# Patient Record
Sex: MALE | Race: WHITE | NOT HISPANIC OR LATINO
[De-identification: names, ages, dates, MRNs, and addresses within clinical notes are randomized per-mention and may not be internally consistent; named-entity substitution may affect disease eponyms.]

---

## 2017-02-21 ENCOUNTER — RECORD ABSTRACTING (OUTPATIENT)
Age: 67
End: 2017-02-21

## 2017-02-21 DIAGNOSIS — Z98.890 OTHER SPECIFIED POSTPROCEDURAL STATES: ICD-10-CM

## 2017-02-21 DIAGNOSIS — Z78.9 OTHER SPECIFIED HEALTH STATUS: ICD-10-CM

## 2017-03-07 ENCOUNTER — APPOINTMENT (OUTPATIENT)
Dept: ELECTROPHYSIOLOGY | Facility: CLINIC | Age: 67
End: 2017-03-07

## 2017-03-07 VITALS
HEART RATE: 77 BPM | DIASTOLIC BLOOD PRESSURE: 95 MMHG | WEIGHT: 190 LBS | BODY MASS INDEX: 26.6 KG/M2 | SYSTOLIC BLOOD PRESSURE: 185 MMHG | HEIGHT: 71 IN

## 2017-03-07 RX ORDER — VALSARTAN 160 MG/1
160 TABLET ORAL
Refills: 0 | Status: DISCONTINUED | COMMUNITY
End: 2017-03-07

## 2017-03-23 ENCOUNTER — APPOINTMENT (OUTPATIENT)
Dept: INTERNAL MEDICINE | Facility: CLINIC | Age: 67
End: 2017-03-23

## 2017-03-23 VITALS — DIASTOLIC BLOOD PRESSURE: 82 MMHG | SYSTOLIC BLOOD PRESSURE: 126 MMHG

## 2017-03-23 VITALS
RESPIRATION RATE: 18 BRPM | DIASTOLIC BLOOD PRESSURE: 60 MMHG | OXYGEN SATURATION: 98 % | SYSTOLIC BLOOD PRESSURE: 132 MMHG | BODY MASS INDEX: 26.88 KG/M2 | WEIGHT: 192 LBS | TEMPERATURE: 98.5 F | HEART RATE: 86 BPM | HEIGHT: 71 IN

## 2017-03-23 RX ORDER — DICLOFENAC SODIUM 75 MG/1
75 TABLET, DELAYED RELEASE ORAL
Qty: 180 | Refills: 0 | Status: COMPLETED | COMMUNITY
Start: 2016-02-23

## 2017-03-23 RX ORDER — BUTALBITAL, ACETAMINOPHEN AND CAFFEINE 300; 50; 40 MG/1; MG/1; MG/1
50-300-40 CAPSULE ORAL
Qty: 60 | Refills: 0 | Status: COMPLETED | COMMUNITY
Start: 2017-02-23 | End: 2017-03-23

## 2017-03-23 RX ORDER — WARFARIN 2.5 MG/1
2.5 TABLET ORAL
Qty: 45 | Refills: 0 | Status: DISCONTINUED | COMMUNITY
Start: 2017-01-12

## 2017-03-23 RX ORDER — COLCHICINE 0.6 MG/1
0.6 CAPSULE ORAL
Qty: 30 | Refills: 0 | Status: DISCONTINUED | COMMUNITY
Start: 2017-01-25

## 2017-03-23 RX ORDER — ESCITALOPRAM OXALATE 10 MG/1
10 TABLET ORAL
Qty: 30 | Refills: 0 | Status: DISCONTINUED | COMMUNITY
Start: 2016-12-12

## 2017-03-23 RX ORDER — CLOPIDOGREL BISULFATE 75 MG/1
75 TABLET, FILM COATED ORAL
Qty: 90 | Refills: 0 | Status: DISCONTINUED | COMMUNITY
Start: 2016-09-29

## 2017-03-23 RX ORDER — AMOXICILLIN 500 MG/1
500 CAPSULE ORAL
Qty: 20 | Refills: 0 | Status: COMPLETED | COMMUNITY
Start: 2016-10-11

## 2017-04-24 ENCOUNTER — RX RENEWAL (OUTPATIENT)
Age: 67
End: 2017-04-24

## 2017-04-27 ENCOUNTER — APPOINTMENT (OUTPATIENT)
Dept: ELECTROPHYSIOLOGY | Facility: CLINIC | Age: 67
End: 2017-04-27

## 2017-05-16 ENCOUNTER — OTHER (OUTPATIENT)
Age: 67
End: 2017-05-16

## 2017-05-22 LAB
CHOLEST SERPL-MCNC: 153
GLUCOSE SERPL-MCNC: 92
HBA1C MFR BLD HPLC: 5.8
HDLC SERPL-MCNC: 51
LDLC SERPL CALC-MCNC: 83
PSA SERPL-MCNC: 1.8

## 2017-05-23 ENCOUNTER — APPOINTMENT (OUTPATIENT)
Dept: INTERNAL MEDICINE | Facility: CLINIC | Age: 67
End: 2017-05-23

## 2017-05-23 VITALS
BODY MASS INDEX: 26.88 KG/M2 | DIASTOLIC BLOOD PRESSURE: 88 MMHG | WEIGHT: 192 LBS | SYSTOLIC BLOOD PRESSURE: 162 MMHG | RESPIRATION RATE: 18 BRPM | HEART RATE: 82 BPM | OXYGEN SATURATION: 98 % | HEIGHT: 71 IN | TEMPERATURE: 98.2 F

## 2017-05-23 DIAGNOSIS — R51 HEADACHE: ICD-10-CM

## 2017-07-20 ENCOUNTER — MEDICATION RENEWAL (OUTPATIENT)
Age: 67
End: 2017-07-20

## 2017-10-03 ENCOUNTER — RX RENEWAL (OUTPATIENT)
Age: 67
End: 2017-10-03

## 2017-11-10 ENCOUNTER — RX RENEWAL (OUTPATIENT)
Age: 67
End: 2017-11-10

## 2017-11-21 ENCOUNTER — APPOINTMENT (OUTPATIENT)
Dept: INTERNAL MEDICINE | Facility: CLINIC | Age: 67
End: 2017-11-21
Payer: MEDICARE

## 2017-11-21 VITALS
DIASTOLIC BLOOD PRESSURE: 84 MMHG | HEART RATE: 75 BPM | BODY MASS INDEX: 28.42 KG/M2 | SYSTOLIC BLOOD PRESSURE: 140 MMHG | HEIGHT: 71 IN | TEMPERATURE: 97.9 F | WEIGHT: 203 LBS | RESPIRATION RATE: 16 BRPM | OXYGEN SATURATION: 97 %

## 2017-11-21 PROCEDURE — 94729 DIFFUSING CAPACITY: CPT

## 2017-11-21 PROCEDURE — 99214 OFFICE O/P EST MOD 30 MIN: CPT | Mod: 25

## 2017-11-21 PROCEDURE — 94010 BREATHING CAPACITY TEST: CPT

## 2017-11-21 RX ORDER — ALPRAZOLAM 0.25 MG/1
0.25 TABLET ORAL
Qty: 30 | Refills: 0 | Status: DISCONTINUED | COMMUNITY
Start: 2016-11-08 | End: 2017-11-21

## 2017-11-21 RX ORDER — ROSUVASTATIN CALCIUM 5 MG/1
TABLET, FILM COATED ORAL
Refills: 0 | Status: DISCONTINUED | COMMUNITY
End: 2017-11-21

## 2017-11-21 RX ORDER — METOPROLOL SUCCINATE 50 MG/1
50 TABLET, EXTENDED RELEASE ORAL
Refills: 0 | Status: DISCONTINUED | COMMUNITY
End: 2017-11-21

## 2017-11-21 RX ORDER — AMIODARONE HYDROCHLORIDE 100 MG/1
100 TABLET ORAL DAILY
Qty: 30 | Refills: 0 | Status: DISCONTINUED | COMMUNITY
Start: 2017-03-07 | End: 2017-11-21

## 2018-01-02 ENCOUNTER — RX RENEWAL (OUTPATIENT)
Age: 68
End: 2018-01-02

## 2018-01-22 ENCOUNTER — OTHER (OUTPATIENT)
Age: 68
End: 2018-01-22

## 2018-01-24 ENCOUNTER — NON-APPOINTMENT (OUTPATIENT)
Age: 68
End: 2018-01-24

## 2018-01-24 ENCOUNTER — APPOINTMENT (OUTPATIENT)
Dept: INTERNAL MEDICINE | Facility: CLINIC | Age: 68
End: 2018-01-24
Payer: MEDICARE

## 2018-01-24 VITALS
TEMPERATURE: 97.5 F | BODY MASS INDEX: 28.56 KG/M2 | OXYGEN SATURATION: 99 % | SYSTOLIC BLOOD PRESSURE: 108 MMHG | HEART RATE: 70 BPM | DIASTOLIC BLOOD PRESSURE: 84 MMHG | RESPIRATION RATE: 18 BRPM | WEIGHT: 203.99 LBS | HEIGHT: 71 IN

## 2018-01-24 DIAGNOSIS — Z95.2 PRESENCE OF PROSTHETIC HEART VALVE: ICD-10-CM

## 2018-01-24 DIAGNOSIS — Z79.899 OTHER LONG TERM (CURRENT) DRUG THERAPY: ICD-10-CM

## 2018-01-24 DIAGNOSIS — F52.21 MALE ERECTILE DISORDER: ICD-10-CM

## 2018-01-24 DIAGNOSIS — R33.9 RETENTION OF URINE, UNSPECIFIED: ICD-10-CM

## 2018-01-24 PROCEDURE — 94060 EVALUATION OF WHEEZING: CPT

## 2018-01-24 PROCEDURE — 99214 OFFICE O/P EST MOD 30 MIN: CPT | Mod: 25

## 2018-01-24 RX ORDER — PREDNISONE 10 MG/1
10 TABLET ORAL
Qty: 30 | Refills: 0 | Status: COMPLETED | COMMUNITY
Start: 2017-10-03

## 2018-01-24 RX ORDER — METOPROLOL TARTRATE 50 MG/1
50 TABLET, FILM COATED ORAL
Qty: 90 | Refills: 0 | Status: COMPLETED | COMMUNITY
Start: 2017-10-03

## 2018-01-24 RX ORDER — TRAMADOL HYDROCHLORIDE 50 MG/1
50 TABLET, COATED ORAL
Qty: 20 | Refills: 0 | Status: COMPLETED | COMMUNITY
Start: 2017-09-25

## 2018-01-24 RX ORDER — SILDENAFIL CITRATE 100 MG/1
100 TABLET, FILM COATED ORAL
Qty: 18 | Refills: 0 | Status: ACTIVE | COMMUNITY
Start: 2017-07-25

## 2018-01-31 NOTE — ASU PATIENT PROFILE, ADULT - PMH
BPH (benign prostatic hyperplasia)    ED (erectile dysfunction)    Frequency of urination    H/O hyperlipidemia    H/O urethral stricture    HTN (hypertension)    PAT (paroxysmal atrial tachycardia)    Ureteral stricture Asthma    BPH (benign prostatic hyperplasia)    CAD (coronary artery disease)    ED (erectile dysfunction)    Frequency of urination    H/O hyperlipidemia    H/O urethral stricture    HTN (hypertension)    PAT (paroxysmal atrial tachycardia)    Ureteral stricture

## 2018-01-31 NOTE — ASU PATIENT PROFILE, ADULT - PSH
Aortic valve replaced  [facility]  H/O coronary artery bypass surgery    H/O cystoscopy  x2  H/O inguinal hernia repair

## 2018-02-01 ENCOUNTER — OUTPATIENT (OUTPATIENT)
Dept: OUTPATIENT SERVICES | Facility: HOSPITAL | Age: 68
LOS: 1 days | Discharge: HOME | End: 2018-02-01

## 2018-02-01 VITALS
DIASTOLIC BLOOD PRESSURE: 90 MMHG | SYSTOLIC BLOOD PRESSURE: 157 MMHG | HEART RATE: 61 BPM | WEIGHT: 195.11 LBS | HEIGHT: 71 IN | TEMPERATURE: 98 F | RESPIRATION RATE: 16 BRPM | OXYGEN SATURATION: 100 %

## 2018-02-01 VITALS
RESPIRATION RATE: 16 BRPM | OXYGEN SATURATION: 99 % | SYSTOLIC BLOOD PRESSURE: 150 MMHG | TEMPERATURE: 97 F | HEART RATE: 60 BPM | DIASTOLIC BLOOD PRESSURE: 78 MMHG

## 2018-02-01 DIAGNOSIS — Z95.2 PRESENCE OF PROSTHETIC HEART VALVE: Chronic | ICD-10-CM

## 2018-02-01 DIAGNOSIS — Z95.1 PRESENCE OF AORTOCORONARY BYPASS GRAFT: Chronic | ICD-10-CM

## 2018-02-01 DIAGNOSIS — Z95.1 PRESENCE OF AORTOCORONARY BYPASS GRAFT: ICD-10-CM

## 2018-02-01 DIAGNOSIS — Z98.890 OTHER SPECIFIED POSTPROCEDURAL STATES: Chronic | ICD-10-CM

## 2018-02-01 DIAGNOSIS — I10 ESSENTIAL (PRIMARY) HYPERTENSION: ICD-10-CM

## 2018-02-01 DIAGNOSIS — N35.8 OTHER URETHRAL STRICTURE: ICD-10-CM

## 2018-02-01 DIAGNOSIS — I48.0 PAROXYSMAL ATRIAL FIBRILLATION: ICD-10-CM

## 2018-02-01 DIAGNOSIS — E78.5 HYPERLIPIDEMIA, UNSPECIFIED: ICD-10-CM

## 2018-02-01 DIAGNOSIS — Z95.2 PRESENCE OF PROSTHETIC HEART VALVE: ICD-10-CM

## 2018-02-01 LAB
INR BLD: 1 RATIO — SIGNIFICANT CHANGE UP (ref 0.88–1.16)
PROTHROM AB SERPL-ACNC: 10.8 SEC — SIGNIFICANT CHANGE UP (ref 9.8–12.7)

## 2018-02-01 RX ORDER — WARFARIN SODIUM 2.5 MG/1
0 TABLET ORAL
Qty: 0 | Refills: 0 | COMMUNITY

## 2018-02-01 RX ORDER — ACETAMINOPHEN 500 MG
100 TABLET ORAL
Qty: 0 | Refills: 0 | DISCHARGE
Start: 2018-02-01

## 2018-02-01 RX ORDER — ONDANSETRON 8 MG/1
4 TABLET, FILM COATED ORAL ONCE
Qty: 0 | Refills: 0 | Status: DISCONTINUED | OUTPATIENT
Start: 2018-02-01 | End: 2018-02-01

## 2018-02-01 RX ORDER — OXYCODONE HYDROCHLORIDE 5 MG/1
5 TABLET ORAL EVERY 4 HOURS
Qty: 0 | Refills: 0 | Status: DISCONTINUED | OUTPATIENT
Start: 2018-02-01 | End: 2018-02-01

## 2018-02-01 RX ORDER — ACETAMINOPHEN 500 MG
1000 TABLET ORAL ONCE
Qty: 0 | Refills: 0 | Status: DISCONTINUED | OUTPATIENT
Start: 2018-02-01 | End: 2018-02-01

## 2018-02-01 RX ORDER — FENTANYL CITRATE 50 UG/ML
50 INJECTION INTRAVENOUS
Qty: 0 | Refills: 0 | Status: DISCONTINUED | OUTPATIENT
Start: 2018-02-01 | End: 2018-02-01

## 2018-02-01 RX ORDER — SODIUM CHLORIDE 9 MG/ML
1000 INJECTION INTRAMUSCULAR; INTRAVENOUS; SUBCUTANEOUS
Qty: 0 | Refills: 0 | Status: DISCONTINUED | OUTPATIENT
Start: 2018-02-01 | End: 2018-02-01

## 2018-02-01 RX ADMIN — SODIUM CHLORIDE 75 MILLILITER(S): 9 INJECTION INTRAMUSCULAR; INTRAVENOUS; SUBCUTANEOUS at 08:23

## 2018-02-01 NOTE — ASU DISCHARGE PLAN (ADULT/PEDIATRIC). - MEDICATION SUMMARY - MEDICATIONS TO TAKE
I will START or STAY ON the medications listed below when I get home from the hospital:    Viagra 100 mg oral tablet  -- Indication: For PAT (paroxysmal atrial tachycardia)    acetaminophen 10 mg/mL intravenous solution  -- 100 milliliter(s) intravenous once  -- Indication: For URETHRAL STRICTURE    aspirin 81 mg oral tablet  -- Indication: For Ureteral stricture    valsartan 320 mg oral tablet  -- 1 tab(s) by mouth once a day  -- Indication: For URETHRAL STRICTURE    Lovenox 80 mg/0.8 mL injectable solution  -- injectable 2 times a day  -- Indication: For Ureteral stricture    rosuvastatin 5 mg oral tablet  -- 1  by mouth every other day  -- Indication: For PAT (paroxysmal atrial tachycardia)    ezetimibe 10 mg oral tablet  -- 1 tab(s) by mouth once a day  -- Indication: For HTN (hypertension)    Metoprolol Tartrate 100 mg oral tablet  -- 1 tab(s) by mouth every 12 hours  -- Indication: For Ureteral stricture

## 2018-02-01 NOTE — BRIEF OPERATIVE NOTE - PROCEDURE
<<-----Click on this checkbox to enter Procedure Cystourethroscopy with urethrotomy  02/01/2018    Active  [id number]

## 2018-04-06 PROBLEM — I10 ESSENTIAL (PRIMARY) HYPERTENSION: Chronic | Status: ACTIVE | Noted: 2018-01-31

## 2018-04-06 PROBLEM — R35.0 FREQUENCY OF MICTURITION: Chronic | Status: ACTIVE | Noted: 2018-01-31

## 2018-04-06 PROBLEM — I47.1 SUPRAVENTRICULAR TACHYCARDIA: Chronic | Status: ACTIVE | Noted: 2018-01-31

## 2018-04-06 PROBLEM — I25.10 ATHEROSCLEROTIC HEART DISEASE OF NATIVE CORONARY ARTERY WITHOUT ANGINA PECTORIS: Chronic | Status: ACTIVE | Noted: 2018-01-31

## 2018-04-06 PROBLEM — N40.0 BENIGN PROSTATIC HYPERPLASIA WITHOUT LOWER URINARY TRACT SYMPTOMS: Chronic | Status: ACTIVE | Noted: 2018-01-31

## 2018-04-06 PROBLEM — N13.5 CROSSING VESSEL AND STRICTURE OF URETER WITHOUT HYDRONEPHROSIS: Chronic | Status: ACTIVE | Noted: 2018-01-31

## 2018-04-06 PROBLEM — N52.9 MALE ERECTILE DYSFUNCTION, UNSPECIFIED: Chronic | Status: ACTIVE | Noted: 2018-01-31

## 2018-04-06 PROBLEM — Z86.39 PERSONAL HISTORY OF OTHER ENDOCRINE, NUTRITIONAL AND METABOLIC DISEASE: Chronic | Status: ACTIVE | Noted: 2018-01-31

## 2018-04-17 ENCOUNTER — APPOINTMENT (OUTPATIENT)
Dept: INTERNAL MEDICINE | Facility: CLINIC | Age: 68
End: 2018-04-17
Payer: MEDICARE

## 2018-04-17 VITALS
WEIGHT: 200 LBS | TEMPERATURE: 98.6 F | HEIGHT: 71 IN | BODY MASS INDEX: 28 KG/M2 | RESPIRATION RATE: 16 BRPM | OXYGEN SATURATION: 97 % | SYSTOLIC BLOOD PRESSURE: 120 MMHG | HEART RATE: 74 BPM | DIASTOLIC BLOOD PRESSURE: 80 MMHG

## 2018-04-17 DIAGNOSIS — F17.200 NICOTINE DEPENDENCE, UNSPECIFIED, UNCOMPLICATED: ICD-10-CM

## 2018-04-17 DIAGNOSIS — Z80.9 FAMILY HISTORY OF MALIGNANT NEOPLASM, UNSPECIFIED: ICD-10-CM

## 2018-04-17 DIAGNOSIS — Z80.42 FAMILY HISTORY OF MALIGNANT NEOPLASM OF PROSTATE: ICD-10-CM

## 2018-04-17 DIAGNOSIS — R73.02 IMPAIRED GLUCOSE TOLERANCE (ORAL): ICD-10-CM

## 2018-04-17 DIAGNOSIS — G47.00 INSOMNIA, UNSPECIFIED: ICD-10-CM

## 2018-04-17 PROCEDURE — 94727 GAS DIL/WSHOT DETER LNG VOL: CPT

## 2018-04-17 PROCEDURE — 99214 OFFICE O/P EST MOD 30 MIN: CPT | Mod: 25

## 2018-04-17 PROCEDURE — 94729 DIFFUSING CAPACITY: CPT

## 2018-04-17 PROCEDURE — 94060 EVALUATION OF WHEEZING: CPT

## 2018-07-02 ENCOUNTER — RX RENEWAL (OUTPATIENT)
Age: 68
End: 2018-07-02

## 2018-07-26 PROBLEM — R51 HEADACHE, UNSPECIFIED HEADACHE TYPE: Status: ACTIVE | Noted: 2017-03-07

## 2018-07-26 PROBLEM — Z78.9 ALCOHOL USE: Status: ACTIVE | Noted: 2017-02-21

## 2018-09-13 LAB
GLUCOSE SERPL-MCNC: 101
INR PPP: 2.9
PT BLD: 29.6

## 2018-09-26 ENCOUNTER — RX RENEWAL (OUTPATIENT)
Age: 68
End: 2018-09-26

## 2018-11-05 ENCOUNTER — RX RENEWAL (OUTPATIENT)
Age: 68
End: 2018-11-05

## 2018-11-12 ENCOUNTER — APPOINTMENT (OUTPATIENT)
Dept: INTERNAL MEDICINE | Facility: CLINIC | Age: 68
End: 2018-11-12
Payer: MEDICARE

## 2018-11-12 ENCOUNTER — NON-APPOINTMENT (OUTPATIENT)
Age: 68
End: 2018-11-12

## 2018-11-12 VITALS
WEIGHT: 195.99 LBS | HEART RATE: 68 BPM | BODY MASS INDEX: 27.44 KG/M2 | DIASTOLIC BLOOD PRESSURE: 84 MMHG | RESPIRATION RATE: 16 BRPM | HEIGHT: 71 IN | TEMPERATURE: 98.4 F | SYSTOLIC BLOOD PRESSURE: 128 MMHG | OXYGEN SATURATION: 97 %

## 2018-11-12 PROCEDURE — 99214 OFFICE O/P EST MOD 30 MIN: CPT | Mod: 25

## 2018-11-12 PROCEDURE — 94010 BREATHING CAPACITY TEST: CPT

## 2018-11-12 RX ORDER — ROSUVASTATIN CALCIUM 5 MG/1
5 TABLET, FILM COATED ORAL
Qty: 90 | Refills: 3 | Status: DISCONTINUED | COMMUNITY
Start: 2017-11-10 | End: 2018-11-12

## 2018-11-12 RX ORDER — TRAZODONE HYDROCHLORIDE 50 MG/1
50 TABLET ORAL
Qty: 180 | Refills: 1 | Status: DISCONTINUED | COMMUNITY
Start: 2018-04-17 | End: 2018-11-12

## 2018-11-12 RX ORDER — PREDNISONE 20 MG/1
20 TABLET ORAL TWICE DAILY
Qty: 28 | Refills: 0 | Status: DISCONTINUED | COMMUNITY
Start: 2018-04-17 | End: 2018-11-12

## 2018-11-12 NOTE — HISTORY OF PRESENT ILLNESS
[de-identified] : The patient comes in today for a followup evaluation and reassessment. There are several issues to discuss.\par \par From a cardiac standpoint, he continues to do well. He has been compliant with his medications. He denies any chest pains fevers chills and night sweats. There have been no palpitations. He states that he has been exercising by walking 4 miles approximately 5-6 times per week. He monitors his blood pressure at home, and the systolic has been between 130 and 140, and a diastolic in the low 80s.\par \par Recently, the patient's lipid lowering medications were switched from Crestor, over to Repatha. He has been on this new medication now for the past few months. He notes that he has not had any myalgias since starting the new injectable medication. He has been very compliant.\par \par The patient continues to see his urologist Dr. Dobson on a regular basis. He still has urinary urgency. He did have issues with the urethral stricture. He did undergo surgery back on 2/1/18 which helped slightly. He continues to catheterize himself approximately 5-6 times per week, just to prevent a stricture from returning.\par \par The patient denies any other constitutional symptoms at this time. He now comes in for this assessment.\par \par

## 2018-11-12 NOTE — PLAN
[FreeTextEntry1] : 1. Continue with medication as outlined above.\par \par 2. The patient will continue to remain extremely well-hydrated do to his history of vasovagal episodes.\par \par 3. The patient will continue with his lipid-lowering medications under the direction of Dr. Flores. I told him to ask him at the time of the next visit, if the Zetia can be discontinued.\par \par 4. The new shingles vaccine will be obtained at a local pharmacy.\par \par 5. Routine neurologic followup with Dr. Dobson\par \par 6. The patient is due for colonoscopy next year. He may consider the cholguard due to his history of difficulty with bridging with Lovenox and resumption of warfarin.\par \par 7. Your current exercise regimen.\par \par 8. Follow up with myself in 6 months with a wellness evaluation. He will undergo her yearly routine fasting blood work several days prior to that visit.

## 2018-11-12 NOTE — DATA REVIEWED
[FreeTextEntry1] : Blood work is reviewed.\par \par Spirometric analysis reveals a mild to moderate degree of obstruction. Bronchodilator reactivity was not assessed.

## 2018-11-12 NOTE — PHYSICAL EXAM
[General Appearance - Alert] : alert [General Appearance - In No Acute Distress] : in no acute distress [Outer Ear] : the ears and nose were normal in appearance [Oropharynx] : the oropharynx was normal [Neck Appearance] : the appearance of the neck was normal [Neck Cervical Mass (___cm)] : no neck mass was observed [Jugular Venous Distention Increased] : there was no jugular-venous distention [Thyroid Diffuse Enlargement] : the thyroid was not enlarged [Thyroid Nodule] : there were no palpable thyroid nodules [Apical Impulse] : the apical impulse was normal [Heart Sounds] : normal S1 and S2 [Arterial Pulses Carotid] : carotid pulses were normal with no bruits [Edema] : there was no peripheral edema [Bowel Sounds] : normal bowel sounds [Abdomen Soft] : soft [Abdomen Tenderness] : non-tender [] : no hepato-splenomegaly [Abdomen Mass (___ Cm)] : no abdominal mass palpated [Cervical Lymph Nodes Enlarged Posterior Bilaterally] : posterior cervical [Cervical Lymph Nodes Enlarged Anterior Bilaterally] : anterior cervical [Supraclavicular Lymph Nodes Enlarged Bilaterally] : supraclavicular [No CVA Tenderness] : no ~M costovertebral angle tenderness [FreeTextEntry1] : There is a well-healed scar in the lumbosacral region [Nail Clubbing] : no clubbing  or cyanosis of the fingernails

## 2018-12-31 ENCOUNTER — RX RENEWAL (OUTPATIENT)
Age: 68
End: 2018-12-31

## 2019-05-17 ENCOUNTER — APPOINTMENT (OUTPATIENT)
Dept: INTERNAL MEDICINE | Facility: CLINIC | Age: 69
End: 2019-05-17
Payer: MEDICARE

## 2019-05-17 VITALS
RESPIRATION RATE: 18 BRPM | BODY MASS INDEX: 27.16 KG/M2 | TEMPERATURE: 99.1 F | HEART RATE: 75 BPM | WEIGHT: 194 LBS | SYSTOLIC BLOOD PRESSURE: 134 MMHG | OXYGEN SATURATION: 98 % | DIASTOLIC BLOOD PRESSURE: 72 MMHG | HEIGHT: 71 IN

## 2019-05-17 PROCEDURE — G0438: CPT

## 2019-05-17 PROCEDURE — 99213 OFFICE O/P EST LOW 20 MIN: CPT | Mod: 25

## 2019-05-17 RX ORDER — EZETIMIBE 10 MG/1
10 TABLET ORAL
Qty: 90 | Refills: 1 | Status: DISCONTINUED | COMMUNITY
Start: 2018-01-02 | End: 2019-05-17

## 2019-05-17 NOTE — HISTORY OF PRESENT ILLNESS
[de-identified] : The patient comes in today for a wellness evaluation.\par \par Overall, the patient states that he is doing fairly well at this time. He did suffer a torn right hamstring last year. Apparently, he aggravated it after his last visit with me in November. He sought evaluation with Dr. Barron. An MRI was performed and he was noted to have a severe tear. He went to physical therapy with only minimal improvement. Gradually, over time, the injury has slowly improved. Unfortunately, he has also been complaining of bursitis in the right hip, as well as discomfort in the right ankle.\par \par The patient was seen by his gastroenterologist, Dr. hernandez. He was due for a colonoscopy this year. He did, however, substitute this with a colo-guard analysis which was negative. He was also seen recently by his cardiologist. He is stable in this regard. An echocardiogram and a cardiac PET CT scan were performed which were unremarkable. He was seen by his urologist. He continues to self catheterize himself approximately 4 times a week to maintain patency of the previously repaired stricture.\par \par The patient has been exercising and remains active. He denies any chest pains. There are no palpitations. He did undergo routine blood work. He now comes in for this assessment.

## 2019-05-17 NOTE — DATA REVIEWED
[FreeTextEntry1] : Yearly routine fasting blood work is reviewed. 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

## 2019-05-17 NOTE — REVIEW OF SYSTEMS
[Negative] : Psychiatric [FreeTextEntry8] : see history of present illness [FreeTextEntry9] : see history of present illness [FreeTextEntry5] : see history of present illness

## 2019-05-17 NOTE — PLAN
[FreeTextEntry1] : 1. Continue his medication as outlined above.\par \par 2. Continue his routine cardiology followup. He is being monitored on the Repatha for treatment of his hypercholesterolemia. His cardiologist, Dr. Flores, also monitors his INR.\par \par 3. Await carotid duplex Dopplers and an aortic sonogram which are scheduled for next month.\par \par 4. The patient will undergo a repeat CBC and serum LDH in mid June to reevaluate the leukopenia.\par \par 5. Followup in 6 months with spirometry, office visit, and flu shot.\par \par 6. The patient needs to obtain a second dose of the new shingles vaccine in the near future.

## 2019-05-17 NOTE — PHYSICAL EXAM
[General Appearance - Alert] : alert [General Appearance - In No Acute Distress] : in no acute distress [General Appearance - Well-Appearing] : healthy appearing [General Appearance - Well Developed] : well developed [Sclera] : the sclera and conjunctiva were normal [PERRL With Normal Accommodation] : pupils were equal in size, round, and reactive to light [Strabismus] : no strabismus was seen [Examination Of The Oral Cavity] : the lips and gums were normal [Hearing Threshold Finger Rub Not [place]] : hearing was normal [Outer Ear] : the ears and nose were normal in appearance [Neck Appearance] : the appearance of the neck was normal [Oropharynx] : the oropharynx was normal [Jugular Venous Distention Increased] : there was no jugular-venous distention [Neck Cervical Mass (___cm)] : no neck mass was observed [Thyroid Diffuse Enlargement] : the thyroid was not enlarged [Respiration, Rhythm And Depth] : normal respiratory rhythm and effort [Exaggerated Use Of Accessory Muscles For Inspiration] : no accessory muscle use [Auscultation Breath Sounds / Voice Sounds] : lungs were clear to auscultation bilaterally [Heart Sounds Gallop] : no gallops [Heart Rate And Rhythm] : heart rate was normal and rhythm regular [Heart Sounds Pericardial Friction Rub] : no pericardial rub [Systolic grade ___/6] : A grade [unfilled]/6 systolic murmur was heard. [FreeTextEntry1] : Mechanical S1 and S2 [Abdomen Soft] : soft [Veins - Varicosity Changes] : there were no varicosital changes [Abdomen Mass (___ Cm)] : no abdominal mass palpated [Edema] : there was no peripheral edema [Cervical Lymph Nodes Enlarged Anterior Bilaterally] : anterior cervical [Abnormal Walk] : normal gait [Supraclavicular Lymph Nodes Enlarged Bilaterally] : supraclavicular [Musculoskeletal - Swelling] : no joint swelling seen [Nail Clubbing] : no clubbing  or cyanosis of the fingernails [Skin Color & Pigmentation] : normal skin color and pigmentation [] : no rash [No Focal Deficits] : no focal deficits [Skin Turgor] : normal skin turgor [Oriented To Time, Place, And Person] : oriented to person, place, and time [Affect] : the affect was normal [Impaired Insight] : insight and judgment were intact

## 2019-06-17 LAB
BASOPHILS # BLD AUTO: 0.04 K/UL
BASOPHILS NFR BLD AUTO: 0.9 %
EOSINOPHIL # BLD AUTO: 0.09 K/UL
EOSINOPHIL NFR BLD AUTO: 2.1 %
HCT VFR BLD CALC: 42.3 %
HGB BLD-MCNC: 14.2 G/DL
IMM GRANULOCYTES NFR BLD AUTO: 0.5 %
LDH SERPL-CCNC: 252 U/L
LYMPHOCYTES # BLD AUTO: 1.25 K/UL
LYMPHOCYTES NFR BLD AUTO: 29.2 %
MAN DIFF?: NORMAL
MCHC RBC-ENTMCNC: 33.6 GM/DL
MCHC RBC-ENTMCNC: 33.6 PG
MCV RBC AUTO: 100.2 FL
MONOCYTES # BLD AUTO: 0.63 K/UL
MONOCYTES NFR BLD AUTO: 14.7 %
NEUTROPHILS # BLD AUTO: 2.25 K/UL
NEUTROPHILS NFR BLD AUTO: 52.6 %
PLATELET # BLD AUTO: 164 K/UL
RBC # BLD: 4.22 M/UL
RBC # FLD: 13.9 %
WBC # FLD AUTO: 4.28 K/UL

## 2019-06-19 LAB
MEV IGG FLD QL IA: 14.4 AU/ML
MEV IGG FLD QL IA: 15 AU/ML
MEV IGG+IGM SER-IMP: NEGATIVE
MEV IGG+IGM SER-IMP: NEGATIVE
MEV IGM SER QL: NEGATIVE
RUBV IGG FLD-ACNC: 27.2 INDEX
RUBV IGG FLD-ACNC: 29.9 INDEX
RUBV IGG SER-IMP: POSITIVE
RUBV IGG SER-IMP: POSITIVE
RUBV IGM FLD-ACNC: <20 AU/ML

## 2019-06-21 ENCOUNTER — APPOINTMENT (OUTPATIENT)
Dept: INTERNAL MEDICINE | Facility: CLINIC | Age: 69
End: 2019-06-21
Payer: MEDICARE

## 2019-06-21 ENCOUNTER — MED ADMIN CHARGE (OUTPATIENT)
Age: 69
End: 2019-06-21

## 2019-06-21 PROCEDURE — 90707 MMR VACCINE SC: CPT | Mod: GY

## 2019-06-21 PROCEDURE — 90471 IMMUNIZATION ADMIN: CPT | Mod: GY

## 2019-11-11 ENCOUNTER — MEDICATION RENEWAL (OUTPATIENT)
Age: 69
End: 2019-11-11

## 2019-12-20 ENCOUNTER — NON-APPOINTMENT (OUTPATIENT)
Age: 69
End: 2019-12-20

## 2019-12-20 ENCOUNTER — APPOINTMENT (OUTPATIENT)
Dept: INTERNAL MEDICINE | Facility: CLINIC | Age: 69
End: 2019-12-20
Payer: MEDICARE

## 2019-12-20 VITALS
HEIGHT: 71 IN | DIASTOLIC BLOOD PRESSURE: 80 MMHG | HEART RATE: 76 BPM | OXYGEN SATURATION: 96 % | BODY MASS INDEX: 27.3 KG/M2 | RESPIRATION RATE: 18 BRPM | TEMPERATURE: 97.9 F | SYSTOLIC BLOOD PRESSURE: 136 MMHG | WEIGHT: 195 LBS

## 2019-12-20 PROCEDURE — 94060 EVALUATION OF WHEEZING: CPT

## 2019-12-20 PROCEDURE — 99214 OFFICE O/P EST MOD 30 MIN: CPT | Mod: 25

## 2019-12-20 NOTE — PHYSICAL EXAM
[General Appearance - Alert] : alert [General Appearance - In No Acute Distress] : in no acute distress [Outer Ear] : the ears and nose were normal in appearance [Oropharynx] : the oropharynx was normal [Neck Appearance] : the appearance of the neck was normal [Neck Cervical Mass (___cm)] : no neck mass was observed [Jugular Venous Distention Increased] : there was no jugular-venous distention [Thyroid Diffuse Enlargement] : the thyroid was not enlarged [Heart Sounds] : normal S1 and S2 [Thyroid Nodule] : there were no palpable thyroid nodules [Apical Impulse] : the apical impulse was normal [Arterial Pulses Carotid] : carotid pulses were normal with no bruits [Edema] : there was no peripheral edema [Bowel Sounds] : normal bowel sounds [Abdomen Soft] : soft [Abdomen Tenderness] : non-tender [Abdomen Mass (___ Cm)] : no abdominal mass palpated [Cervical Lymph Nodes Enlarged Anterior Bilaterally] : anterior cervical [] : no hepato-splenomegaly [Cervical Lymph Nodes Enlarged Posterior Bilaterally] : posterior cervical [No CVA Tenderness] : no ~M costovertebral angle tenderness [Supraclavicular Lymph Nodes Enlarged Bilaterally] : supraclavicular [Nail Clubbing] : no clubbing  or cyanosis of the fingernails [FreeTextEntry1] : There is a well-healed scar in the lumbosacral region

## 2019-12-20 NOTE — HISTORY OF PRESENT ILLNESS
[de-identified] : This patient comes in today for a follow up evaluation. There are several issues to discuss.\par \par Overall, the patient states that he is doing well. He suffers from CAD, HTN, HLD, and afib. He remains compliant with his aspirin, metoprolol, norvasc, repatha, and warfarin for management. He exercises regularly at the gym a few days per week to use the rowing machine and lift weights. He denies CP, tightness, and palpitations. He is followed by Dr. Flores in cardiology. He had a PET/CT scan and echo performed earlier this year. Both were unremarkable.\par \par The patient notes the onset of URI symptoms about two months ago including coughing, wheezing, sore throat, dark green sputum production, and green nasal secretions. He states that his symptoms have come and gone over the past two months, however, they have worsened recently. He denies having a fever. He has not taken any OTC medication for management.\par \par He is followed by Dr. Castle in urology. He continues to self catheterize about 4 times per week to maintain patency or previously repaired stricture.\par \par He is up to date on his vaccines and denies chills, night sweats and any other constitutional symptoms. He now comes in for this assessment.

## 2019-12-20 NOTE — ADDENDUM
[FreeTextEntry1] : I, Gibran Moya, acted solely as a scribe for Dr. Nav Herrera on this date 12/20/2019.

## 2019-12-20 NOTE — PLAN
[FreeTextEntry1] : 1. Continue with medication outlined above.\par \par 2. Continue cardiovascular exercise regimen.\par \par 3. Follow up with Dr. Flores for routine cardiac evaluation.\par \par 4. Begin omnicef, 300 mg, b.i.d for 10 days to treat URI symptoms.\par \par 5. Begin medrol dosepak to treat URI symptoms.\par \par 6. Follow up with myself in 6 months for a wellness evaluation and RFBW.

## 2020-07-06 ENCOUNTER — APPOINTMENT (OUTPATIENT)
Dept: INTERNAL MEDICINE | Facility: CLINIC | Age: 70
End: 2020-07-06
Payer: MEDICARE

## 2020-07-06 VITALS
SYSTOLIC BLOOD PRESSURE: 136 MMHG | DIASTOLIC BLOOD PRESSURE: 80 MMHG | HEART RATE: 76 BPM | TEMPERATURE: 97.9 F | RESPIRATION RATE: 14 BRPM | OXYGEN SATURATION: 96 %

## 2020-07-06 PROCEDURE — G0439: CPT

## 2020-07-06 RX ORDER — CEFDINIR 300 MG/1
300 CAPSULE ORAL
Qty: 20 | Refills: 0 | Status: DISCONTINUED | COMMUNITY
Start: 2019-12-20 | End: 2020-07-06

## 2020-07-06 RX ORDER — METHYLPREDNISOLONE 4 MG/1
4 TABLET ORAL
Qty: 1 | Refills: 0 | Status: DISCONTINUED | COMMUNITY
Start: 2019-12-20 | End: 2020-07-06

## 2020-07-06 NOTE — HEALTH RISK ASSESSMENT
[Yes] : Yes [4 or more  times a week (4 pts)] : 4 or more  times a week (4 points) [1 or 2 (0 pts)] : 1 or 2 (0 points) [Never (0 pts)] : Never (0 points) [No] : In the past 12 months have you used drugs other than those required for medical reasons? No [0] : 2) Feeling down, depressed, or hopeless: Not at all (0) [Smoke Detector] : smoke detector [Carbon Monoxide Detector] : carbon monoxide detector [Guns at Home] : guns at home [Safety elements used in home] : safety elements used in home [Sunscreen] : uses sunscreen [Seat Belt] :  uses seat belt [] : No [de-identified] : former [ColonoscopyDate] : 01/19

## 2020-07-06 NOTE — PHYSICAL EXAM
[General Appearance - In No Acute Distress] : in no acute distress [General Appearance - Alert] : alert [General Appearance - Well-Appearing] : healthy appearing [General Appearance - Well Developed] : well developed [Sclera] : the sclera and conjunctiva were normal [Strabismus] : no strabismus was seen [PERRL With Normal Accommodation] : pupils were equal in size, round, and reactive to light [Outer Ear] : the ears and nose were normal in appearance [Hearing Threshold Finger Rub Not [place]] : hearing was normal [Examination Of The Oral Cavity] : the lips and gums were normal [Oropharynx] : the oropharynx was normal [Neck Appearance] : the appearance of the neck was normal [Neck Cervical Mass (___cm)] : no neck mass was observed [Jugular Venous Distention Increased] : there was no jugular-venous distention [Thyroid Diffuse Enlargement] : the thyroid was not enlarged [Exaggerated Use Of Accessory Muscles For Inspiration] : no accessory muscle use [Respiration, Rhythm And Depth] : normal respiratory rhythm and effort [Auscultation Breath Sounds / Voice Sounds] : lungs were clear to auscultation bilaterally [Heart Rate And Rhythm] : heart rate was normal and rhythm regular [Heart Sounds Gallop] : no gallops [Heart Sounds Pericardial Friction Rub] : no pericardial rub [Systolic grade ___/6] : A grade [unfilled]/6 systolic murmur was heard. [Edema] : there was no peripheral edema [Veins - Varicosity Changes] : there were no varicosital changes [Abdomen Soft] : soft [Abdomen Mass (___ Cm)] : no abdominal mass palpated [Cervical Lymph Nodes Enlarged Anterior Bilaterally] : anterior cervical [Supraclavicular Lymph Nodes Enlarged Bilaterally] : supraclavicular [Abnormal Walk] : normal gait [Nail Clubbing] : no clubbing  or cyanosis of the fingernails [Musculoskeletal - Swelling] : no joint swelling seen [Skin Color & Pigmentation] : normal skin color and pigmentation [Skin Turgor] : normal skin turgor [] : no rash [Oriented To Time, Place, And Person] : oriented to person, place, and time [No Focal Deficits] : no focal deficits [Affect] : the affect was normal [Impaired Insight] : insight and judgment were intact [FreeTextEntry1] : Mechanical S1 and S2 Yes

## 2020-07-06 NOTE — PLAN
[FreeTextEntry1] : 1. Continue with medication as outlined above.\par \par 2. Maintain current exercise regimen.\par \par 3. The patient will complete his cardiac noninvasive workup with carotid duplex studies, and peripheral arterial studies later this month with his cardiologist, Dr. Flores.\par \par 4. Followup in 6 months with Pre-post spirometry and blood pressure check.

## 2020-07-06 NOTE — HISTORY OF PRESENT ILLNESS
[de-identified] : Patient comes in today for a wellness evaluation.\par \par Overall, the patient states he is doing quite well at this time. She continues to exercise fairly regularly. He has lost an additional 10 pounds. He has increased his exercise by walking one hour 5 times a week. He has also cut back on his caloric intake. He denies any breathlessness when performing his walking exercises. There has been no chest pain.\par \par The patient was seen by his cardiologist. He recently underwent an echocardiogram which was stable and without new remarkable findings. He was seen recently by his urologist, Dr. Denver mullins. He continues to self catheterize approximately 3 times a week to prevent to stricture from recurring. His urinary flow is otherwise fair to adequate. He did undergo a colo-guard analysis last year which was negative. He now comes in for this assessment.

## 2020-12-07 ENCOUNTER — APPOINTMENT (OUTPATIENT)
Dept: INTERNAL MEDICINE | Facility: CLINIC | Age: 70
End: 2020-12-07
Payer: MEDICARE

## 2020-12-07 VITALS
TEMPERATURE: 97.9 F | SYSTOLIC BLOOD PRESSURE: 132 MMHG | HEART RATE: 57 BPM | DIASTOLIC BLOOD PRESSURE: 86 MMHG | RESPIRATION RATE: 18 BRPM | OXYGEN SATURATION: 98 % | HEIGHT: 71 IN | BODY MASS INDEX: 26.32 KG/M2 | WEIGHT: 188 LBS

## 2020-12-07 DIAGNOSIS — N35.919 UNSPECIFIED URETHRAL STRICTURE, MALE, UNSPECIFIED SITE: ICD-10-CM

## 2020-12-07 PROCEDURE — 99214 OFFICE O/P EST MOD 30 MIN: CPT

## 2020-12-07 RX ORDER — EVOLOCUMAB 140 MG/ML
140 INJECTION, SOLUTION SUBCUTANEOUS
Qty: 1 | Refills: 5 | Status: ACTIVE | COMMUNITY
Start: 2020-12-07

## 2020-12-07 NOTE — HISTORY OF PRESENT ILLNESS
[de-identified] : The patient comes in today for a followup evaluation. He has several issues to discuss.\par \par The patient was recently seen by his new cardiologist, Dr. Monteiro. He will be taking over for Dr. Flores. At that visit, his blood pressure was noted to be slightly elevated. He denies having any chest pain or chest tightness. He is compliant with his medical regimen as prescribed.\par \par The patient continues to self catheterize himself twice a week for urinary issues which include a urethral stricture. He notes that his urinary flow has been good recently.\par \par The patient is quite anxious over the current coronavirus pandemic. His appetite is slightly diminished secondary to anxiety due to the corona virus.\par \par The patient is complaining of pain in his left knee. He has been followed by his orthopedic surgeon, Dr. Barron. He states that he is not yet ready for a total knee replacement. He is waiting for the discomfort to quiet down before he engages in his exercise once again. There is no change in bowel habits. He is

## 2020-12-07 NOTE — PLAN
[FreeTextEntry1] : #1. Continue his medical regimen as outlined above.\par \par 2. Await cardiac PET CT scan which is scheduled for January of 2021\par \par 3. The INR will continue to be monitored by cardiology.\par \par 4. Routine urologic followup with Dr. Dobson\par \par 5. Follow up with myself in September with a wellness evaluation and yearly routine blood work.

## 2020-12-18 ENCOUNTER — APPOINTMENT (OUTPATIENT)
Dept: INTERNAL MEDICINE | Facility: CLINIC | Age: 70
End: 2020-12-18

## 2021-06-21 ENCOUNTER — NON-APPOINTMENT (OUTPATIENT)
Age: 71
End: 2021-06-21

## 2021-06-21 ENCOUNTER — INPATIENT (INPATIENT)
Facility: HOSPITAL | Age: 71
LOS: 3 days | Discharge: HOME | DRG: 864 | End: 2021-06-25
Attending: FAMILY MEDICINE | Admitting: FAMILY MEDICINE
Payer: MEDICARE

## 2021-06-21 ENCOUNTER — APPOINTMENT (OUTPATIENT)
Dept: INTERNAL MEDICINE | Facility: CLINIC | Age: 71
End: 2021-06-21
Payer: MEDICARE

## 2021-06-21 VITALS
HEART RATE: 60 BPM | WEIGHT: 190 LBS | RESPIRATION RATE: 16 BRPM | OXYGEN SATURATION: 97 % | BODY MASS INDEX: 26.6 KG/M2 | HEIGHT: 71 IN | DIASTOLIC BLOOD PRESSURE: 62 MMHG | SYSTOLIC BLOOD PRESSURE: 118 MMHG | TEMPERATURE: 99.6 F

## 2021-06-21 VITALS — HEIGHT: 71 IN | WEIGHT: 190.04 LBS

## 2021-06-21 DIAGNOSIS — R50.9 FEVER, UNSPECIFIED: ICD-10-CM

## 2021-06-21 DIAGNOSIS — Z98.890 OTHER SPECIFIED POSTPROCEDURAL STATES: Chronic | ICD-10-CM

## 2021-06-21 DIAGNOSIS — Z95.2 PRESENCE OF PROSTHETIC HEART VALVE: Chronic | ICD-10-CM

## 2021-06-21 DIAGNOSIS — Z95.1 PRESENCE OF AORTOCORONARY BYPASS GRAFT: Chronic | ICD-10-CM

## 2021-06-21 LAB
ALBUMIN SERPL ELPH-MCNC: 3.9 G/DL — SIGNIFICANT CHANGE UP (ref 3.3–5)
ALP SERPL-CCNC: 65 U/L — SIGNIFICANT CHANGE UP (ref 40–120)
ALT FLD-CCNC: 50 U/L — SIGNIFICANT CHANGE UP (ref 12–78)
ANION GAP SERPL CALC-SCNC: 8 MMOL/L — SIGNIFICANT CHANGE UP (ref 5–17)
APPEARANCE UR: ABNORMAL
APTT BLD: 35.4 SEC — SIGNIFICANT CHANGE UP (ref 27.5–35.5)
AST SERPL-CCNC: 54 U/L — HIGH (ref 15–37)
BASOPHILS # BLD AUTO: 0.02 K/UL — SIGNIFICANT CHANGE UP (ref 0–0.2)
BASOPHILS NFR BLD AUTO: 0.7 % — SIGNIFICANT CHANGE UP (ref 0–2)
BILIRUB SERPL-MCNC: 1.3 MG/DL — HIGH (ref 0.2–1.2)
BILIRUB UR-MCNC: NEGATIVE — SIGNIFICANT CHANGE UP
BUN SERPL-MCNC: 16 MG/DL — SIGNIFICANT CHANGE UP (ref 7–23)
CALCIUM SERPL-MCNC: 8.5 MG/DL — SIGNIFICANT CHANGE UP (ref 8.5–10.1)
CHLORIDE SERPL-SCNC: 96 MMOL/L — SIGNIFICANT CHANGE UP (ref 96–108)
CO2 SERPL-SCNC: 23 MMOL/L — SIGNIFICANT CHANGE UP (ref 22–31)
COLOR SPEC: YELLOW — SIGNIFICANT CHANGE UP
CREAT SERPL-MCNC: 1.27 MG/DL — SIGNIFICANT CHANGE UP (ref 0.5–1.3)
DIFF PNL FLD: ABNORMAL
EOSINOPHIL # BLD AUTO: 0 K/UL — SIGNIFICANT CHANGE UP (ref 0–0.5)
EOSINOPHIL NFR BLD AUTO: 0 % — SIGNIFICANT CHANGE UP (ref 0–6)
ERYTHROCYTE [SEDIMENTATION RATE] IN BLOOD: 12 MM/HR — SIGNIFICANT CHANGE UP (ref 0–20)
GLUCOSE SERPL-MCNC: 121 MG/DL — HIGH (ref 70–99)
GLUCOSE UR QL: NEGATIVE MG/DL — SIGNIFICANT CHANGE UP
HCT VFR BLD CALC: 38 % — LOW (ref 39–50)
HGB BLD-MCNC: 13.6 G/DL — SIGNIFICANT CHANGE UP (ref 13–17)
IMM GRANULOCYTES NFR BLD AUTO: 0.7 % — SIGNIFICANT CHANGE UP (ref 0–1.5)
INR BLD: 1.9 RATIO — HIGH (ref 0.88–1.16)
KETONES UR-MCNC: ABNORMAL
LACTATE SERPL-SCNC: 1.2 MMOL/L — SIGNIFICANT CHANGE UP (ref 0.7–2)
LEUKOCYTE ESTERASE UR-ACNC: NEGATIVE — SIGNIFICANT CHANGE UP
LYMPHOCYTES # BLD AUTO: 0.33 K/UL — LOW (ref 1–3.3)
LYMPHOCYTES # BLD AUTO: 12.2 % — LOW (ref 13–44)
MCHC RBC-ENTMCNC: 33.3 PG — SIGNIFICANT CHANGE UP (ref 27–34)
MCHC RBC-ENTMCNC: 35.8 GM/DL — SIGNIFICANT CHANGE UP (ref 32–36)
MCV RBC AUTO: 93.1 FL — SIGNIFICANT CHANGE UP (ref 80–100)
MONOCYTES # BLD AUTO: 0.23 K/UL — SIGNIFICANT CHANGE UP (ref 0–0.9)
MONOCYTES NFR BLD AUTO: 8.5 % — SIGNIFICANT CHANGE UP (ref 2–14)
NEUTROPHILS # BLD AUTO: 2.1 K/UL — SIGNIFICANT CHANGE UP (ref 1.8–7.4)
NEUTROPHILS NFR BLD AUTO: 77.9 % — HIGH (ref 43–77)
NITRITE UR-MCNC: NEGATIVE — SIGNIFICANT CHANGE UP
PH UR: 5 — SIGNIFICANT CHANGE UP (ref 5–8)
PLATELET # BLD AUTO: 79 K/UL — LOW (ref 150–400)
POTASSIUM SERPL-MCNC: 4.1 MMOL/L — SIGNIFICANT CHANGE UP (ref 3.5–5.3)
POTASSIUM SERPL-SCNC: 4.1 MMOL/L — SIGNIFICANT CHANGE UP (ref 3.5–5.3)
PROT SERPL-MCNC: 7.5 GM/DL — SIGNIFICANT CHANGE UP (ref 6–8.3)
PROT UR-MCNC: 30 MG/DL
PROTHROM AB SERPL-ACNC: 21.6 SEC — HIGH (ref 10.6–13.6)
RBC # BLD: 4.08 M/UL — LOW (ref 4.2–5.8)
RBC # FLD: 13.4 % — SIGNIFICANT CHANGE UP (ref 10.3–14.5)
SARS-COV-2 RNA SPEC QL NAA+PROBE: SIGNIFICANT CHANGE UP
SODIUM SERPL-SCNC: 127 MMOL/L — LOW (ref 135–145)
SP GR SPEC: 1.01 — SIGNIFICANT CHANGE UP (ref 1.01–1.02)
UROBILINOGEN FLD QL: NEGATIVE MG/DL — SIGNIFICANT CHANGE UP
WBC # BLD: 2.7 K/UL — LOW (ref 3.8–10.5)
WBC # FLD AUTO: 2.7 K/UL — LOW (ref 3.8–10.5)

## 2021-06-21 PROCEDURE — 86618 LYME DISEASE ANTIBODY: CPT

## 2021-06-21 PROCEDURE — 83735 ASSAY OF MAGNESIUM: CPT

## 2021-06-21 PROCEDURE — 85610 PROTHROMBIN TIME: CPT

## 2021-06-21 PROCEDURE — 99214 OFFICE O/P EST MOD 30 MIN: CPT

## 2021-06-21 PROCEDURE — 88365 INSITU HYBRIDIZATION (FISH): CPT

## 2021-06-21 PROCEDURE — 86665 EPSTEIN-BARR CAPSID VCA: CPT

## 2021-06-21 PROCEDURE — 0225U NFCT DS DNA&RNA 21 SARSCOV2: CPT

## 2021-06-21 PROCEDURE — 88313 SPECIAL STAINS GROUP 2: CPT

## 2021-06-21 PROCEDURE — 80061 LIPID PANEL: CPT

## 2021-06-21 PROCEDURE — 86663 EPSTEIN-BARR ANTIBODY: CPT

## 2021-06-21 PROCEDURE — 82746 ASSAY OF FOLIC ACID SERUM: CPT

## 2021-06-21 PROCEDURE — 88342 IMHCHEM/IMCYTCHM 1ST ANTB: CPT

## 2021-06-21 PROCEDURE — 88360 TUMOR IMMUNOHISTOCHEM/MANUAL: CPT

## 2021-06-21 PROCEDURE — 86769 SARS-COV-2 COVID-19 ANTIBODY: CPT

## 2021-06-21 PROCEDURE — 85097 BONE MARROW INTERPRETATION: CPT

## 2021-06-21 PROCEDURE — 80053 COMPREHEN METABOLIC PANEL: CPT

## 2021-06-21 PROCEDURE — 86747 PARVOVIRUS ANTIBODY: CPT

## 2021-06-21 PROCEDURE — 86803 HEPATITIS C AB TEST: CPT

## 2021-06-21 PROCEDURE — 87205 SMEAR GRAM STAIN: CPT

## 2021-06-21 PROCEDURE — 99223 1ST HOSP IP/OBS HIGH 75: CPT | Mod: GC

## 2021-06-21 PROCEDURE — 85027 COMPLETE CBC AUTOMATED: CPT

## 2021-06-21 PROCEDURE — 85025 COMPLETE CBC W/AUTO DIFF WBC: CPT

## 2021-06-21 PROCEDURE — 88341 IMHCHEM/IMCYTCHM EA ADD ANTB: CPT

## 2021-06-21 PROCEDURE — 85730 THROMBOPLASTIN TIME PARTIAL: CPT

## 2021-06-21 PROCEDURE — 86666 EHRLICHIA ANTIBODY: CPT

## 2021-06-21 PROCEDURE — 86664 EPSTEIN-BARR NUCLEAR ANTIGEN: CPT

## 2021-06-21 PROCEDURE — 88264 CHROMOSOME ANALYSIS 20-25: CPT

## 2021-06-21 PROCEDURE — 88185 FLOWCYTOMETRY/TC ADD-ON: CPT

## 2021-06-21 PROCEDURE — 93010 ELECTROCARDIOGRAM REPORT: CPT

## 2021-06-21 PROCEDURE — 86645 CMV ANTIBODY IGM: CPT

## 2021-06-21 PROCEDURE — 88364 INSITU HYBRIDIZATION (FISH): CPT

## 2021-06-21 PROCEDURE — 71250 CT THORAX DX C-: CPT

## 2021-06-21 PROCEDURE — 77012 CT SCAN FOR NEEDLE BIOPSY: CPT

## 2021-06-21 PROCEDURE — 88305 TISSUE EXAM BY PATHOLOGIST: CPT

## 2021-06-21 PROCEDURE — 99285 EMERGENCY DEPT VISIT HI MDM: CPT | Mod: CS

## 2021-06-21 PROCEDURE — 86753 PROTOZOA ANTIBODY NOS: CPT

## 2021-06-21 PROCEDURE — 88271 CYTOGENETICS DNA PROBE: CPT

## 2021-06-21 PROCEDURE — 36415 COLL VENOUS BLD VENIPUNCTURE: CPT

## 2021-06-21 PROCEDURE — 86644 CMV ANTIBODY: CPT

## 2021-06-21 PROCEDURE — 88312 SPECIAL STAINS GROUP 1: CPT

## 2021-06-21 PROCEDURE — 88184 FLOWCYTOMETRY/ TC 1 MARKER: CPT

## 2021-06-21 PROCEDURE — 88280 CHROMOSOME KARYOTYPE STUDY: CPT

## 2021-06-21 PROCEDURE — 71045 X-RAY EXAM CHEST 1 VIEW: CPT | Mod: 26

## 2021-06-21 PROCEDURE — 88285 CHROMOSOME COUNT ADDITIONAL: CPT

## 2021-06-21 PROCEDURE — 87798 DETECT AGENT NOS DNA AMP: CPT

## 2021-06-21 PROCEDURE — 84100 ASSAY OF PHOSPHORUS: CPT

## 2021-06-21 PROCEDURE — 88237 TISSUE CULTURE BONE MARROW: CPT

## 2021-06-21 PROCEDURE — 38222 DX BONE MARROW BX & ASPIR: CPT | Mod: RT

## 2021-06-21 PROCEDURE — 83605 ASSAY OF LACTIC ACID: CPT

## 2021-06-21 PROCEDURE — 93306 TTE W/DOPPLER COMPLETE: CPT

## 2021-06-21 PROCEDURE — 88275 CYTOGENETICS 100-300: CPT

## 2021-06-21 RX ORDER — ACETAMINOPHEN 500 MG
650 TABLET ORAL ONCE
Refills: 0 | Status: COMPLETED | OUTPATIENT
Start: 2021-06-21 | End: 2021-06-21

## 2021-06-21 RX ORDER — ENOXAPARIN SODIUM 100 MG/ML
0 INJECTION SUBCUTANEOUS
Qty: 0 | Refills: 0 | DISCHARGE

## 2021-06-21 RX ORDER — VALSARTAN 80 MG/1
1 TABLET ORAL
Qty: 0 | Refills: 0 | DISCHARGE

## 2021-06-21 RX ORDER — EZETIMIBE 10 MG/1
1 TABLET ORAL
Qty: 0 | Refills: 0 | DISCHARGE

## 2021-06-21 RX ORDER — ASPIRIN/CALCIUM CARB/MAGNESIUM 324 MG
0 TABLET ORAL
Qty: 0 | Refills: 0 | DISCHARGE

## 2021-06-21 RX ORDER — ROSUVASTATIN CALCIUM 5 MG/1
1 TABLET ORAL
Qty: 0 | Refills: 0 | DISCHARGE

## 2021-06-21 RX ADMIN — Medication 650 MILLIGRAM(S): at 22:21

## 2021-06-21 NOTE — ED ADULT NURSE NOTE - CAS EDP DISCH DISPOSITION ADMI
[facility] [address] [place] [address]/[facility]

## 2021-06-21 NOTE — PHYSICAL EXAM
[No Acute Distress] : no acute distress [Normal Oropharynx] : the oropharynx was normal [No JVD] : no jugular venous distention [Supple] : supple [No Respiratory Distress] : no respiratory distress  [No Accessory Muscle Use] : no accessory muscle use [Clear to Auscultation] : lungs were clear to auscultation bilaterally [Regular Rhythm] : with a regular rhythm [Normal S1, S2] : normal S1 and S2 [No Edema] : there was no peripheral edema [No Extremity Clubbing/Cyanosis] : no extremity clubbing/cyanosis [Soft] : abdomen soft [Non Tender] : non-tender [No Joint Swelling] : no joint swelling [No Rash] : no rash [de-identified] : There is a 1-2/6 systolic ejection murmur appreciated. The prosthetic valve is heard.

## 2021-06-21 NOTE — HISTORY OF PRESENT ILLNESS
[FreeTextEntry8] : The patient comes in today for an emergency evaluation. He states that he was well until 6/20/21, when he noted the fairly sudden onset of fever with a temperature up to 102° in the afternoon. He denied having any myalgias. He did have chills with the fever spike. He denies having any rashes or joint swelling. He does not recall having removed any ticks from his body. He denies any cough, chest pain or pleuritic pain. He does usually have scant sputum which occurs in the morning upon awakening which she attributes to postnasal drip. This has persisted and has not worsened or changed.\par \par The patient did undergo a rapid test for coronavirus earlier today which was negative. A PCR is currently pending. At this time, he now has generalized fatigue. He does feel better after Tylenol. He has not lost his sense of taste or smell. He has not had any recent dental work performed. He now comes in for assessment

## 2021-06-21 NOTE — H&P ADULT - NSICDXPASTSURGICALHX_GEN_ALL_CORE_FT
PAST SURGICAL HISTORY:  Aortic valve replaced st judes    H/O coronary artery bypass surgery     H/O cystoscopy x2    H/O inguinal hernia repair

## 2021-06-21 NOTE — ED STATDOCS - OBJECTIVE STATEMENT
70 year old male with hx of CAD, s/p failed TAVR, s/p aortic valve replacement, HTN, HLD sent in by Dr. Herrera for fevers since yesterday (Tmax 102) today. Dr. Herrera was unsure of cause of fevers, so pt was sent to the ED. Pt also reports chills, malaise. No other symptoms per pt. Pt states that his fevers has only been relieved with Tylenol. Pt had negative COVID test today.

## 2021-06-21 NOTE — H&P ADULT - NSHPPHYSICALEXAM_GEN_ALL_CORE
Vital Signs Last 24 Hrs  T(C): 37.9 (22 Jun 2021 00:04), Max: 39.2 (21 Jun 2021 22:14)  T(F): 100.2 (22 Jun 2021 00:04), Max: 102.5 (21 Jun 2021 22:14)  HR: 88 (22 Jun 2021 00:04) (79 - 95)  BP: 154/75 (22 Jun 2021 00:04) (127/77 - 154/75)  BP(mean): 101 (21 Jun 2021 18:32) (101 - 101)  RR: 18 (22 Jun 2021 00:04) (18 - 18)  SpO2: 99% (22 Jun 2021 00:04) (97% - 100%)

## 2021-06-21 NOTE — ED STATDOCS - CLINICAL SUMMARY MEDICAL DECISION MAKING FREE TEXT BOX
70 year old male presents to the ED with fever x 2 days, hx of mechanical heart valve, sent in to r/o endocarditis as pt is otherwise asymptomatic, exam nonfocal, will do septic workup and likely admit.

## 2021-06-21 NOTE — ED STATDOCS - NS_ ATTENDINGSCRIBEDETAILS _ED_A_ED_FT
I, Carlos Olivares MD,  performed the initial face to face bedside interview with this patient regarding history of present illness, review of symptoms and relevant past medical, social and family history.  I completed an independent physical examination.  I was the initial provider who evaluated this patient.  The history, relevant review of systems, past medical and surgical history, medical decision making, and physical examination was documented by the scribe in my presence and I attest to the accuracy of the documentation.

## 2021-06-21 NOTE — ED STATDOCS - NS ED ROS FT
Constitutional: +fevers, chills, malaise  Eyes: No visual changes  HEENT: No throat pain  CV: No chest pain  Resp: No SOB no cough  GI: No abd pain, nausea or vomiting  : No dysuria  MSK: No musculoskeletal pain  Skin: No rash  Neuro: No headache

## 2021-06-21 NOTE — ED STATDOCS - PROGRESS NOTE DETAILS
signed Lyudmila Leach PA-C Pt seen initially in intake by Dr Carlos Olivares.   70M sent in for eval of fever since yesterday Tmax 102 of unclear etiology. Pt denies any specific complaints. Has hx of aortic valve replacement. Took tylenol PTA. Sent in by TOBIAS Velasquez for eval. Likely admit. pt with persistent chills weakness. was sent in for r/o endocarditis/admission. pt endorsed to Dr. North. Carlos Olivares M.D., Attending Physician spoke with Dr. Herrera again - states he thinks pt needs to stay in the ED concern for endocarditis vs babesiosis . will see patient tomorrow. Carlos Olivares M.D., Attending Physician

## 2021-06-21 NOTE — ED STATDOCS - PHYSICAL EXAMINATION
Constitutional: NAD AAOx3  ET; normal TM's bilaterally, normal posterior pharynx  Eyes: PERRLA EOMI  Head: Normocephalic atraumatic  Mouth: MMM  Cardiac: regular rate   Resp: Lungs CTAB  GI: Abd s/nt/nd  Neuro: CN2-12 intact  Skin: No rashes Constitutional: NAD AAOx3  ET; normal TM's bilaterally, normal posterior pharynx  Eyes: PERRLA EOMI  Head: Normocephalic atraumatic  Mouth: MMM  Cardiac: regular rate normal peripheral pulses  Resp: Lungs CTAB  GI: Abd s/nt/nd  Neuro: CN2-12 intact  Skin: No rashes

## 2021-06-21 NOTE — ED STATDOCS - PMH
Asthma    BPH (benign prostatic hyperplasia)    CAD (coronary artery disease)    ED (erectile dysfunction)    Frequency of urination    H/O hyperlipidemia    H/O urethral stricture    HTN (hypertension)    PAT (paroxysmal atrial tachycardia)    Ureteral stricture

## 2021-06-21 NOTE — H&P ADULT - NSICDXPASTMEDICALHX_GEN_ALL_CORE_FT
PAST MEDICAL HISTORY:  Asthma     BPH (benign prostatic hyperplasia)     CAD (coronary artery disease)     ED (erectile dysfunction)     Frequency of urination     H/O hyperlipidemia     H/O urethral stricture     HTN (hypertension)     PAT (paroxysmal atrial tachycardia)     Ureteral stricture

## 2021-06-21 NOTE — ED ADULT TRIAGE NOTE - CHIEF COMPLAINT QUOTE
Patient comes to ED for fever that started yesterday of 102. Patient took Tylenol at 330.   Patient has a artificial heart valve from 2017  Patient was seen at [facility] Herrera office and sent to ED for evaluation  pt denies any pain or discomfort

## 2021-06-21 NOTE — H&P ADULT - ATTENDING COMMENTS
Pt is a pleasant 71 yo Male with PMHx of CAD, s/p failed TAVR 2016, s/p Aortic valve replacement (mechanical) 2017, HTN, HLD admitted with   Fever 103.   - SIRS criteria noted , concern for ?endocarditis mentioned by Dr. Herrera to ED Dr. Olivares  - blood and u cx  - Cefepime and Vanco  - echo and cardiology consult

## 2021-06-21 NOTE — ED ADULT NURSE NOTE - OBJECTIVE STATEMENT
Pt. is a 70YOM Patient c/o fever that started yesterday of 102. Patient took Tylenol at 330. Patient was seen at [facility] office and sent to ED for evaluation

## 2021-06-21 NOTE — H&P ADULT - HISTORY OF PRESENT ILLNESS
71 yo Male with PMHx of CAD, s/p failed TAVR 2016, s/p Aortic valve replacement (mechanical) 2017, HTN, HLD was sent to the ED by Dr. Herrera.  Patient mentions he started having chills, malaise and fevers on Sunday 06/20, max temp that he had was 102.  He went to Dr. Herrera's office and was told to come to the ED.  Patient has been taking Tylenol 325 x2 every time he has fever. Patient denies any chest pain, abdominal pain, nausea, vomiting, diarrhea, constipation, urinary frequency or urgency or cough. 69 yo Male with PMHx of CAD, s/p failed TAVR 2016, s/p Aortic valve replacement (mechanical) 2017, HTN, HLD was sent to the ED by Dr. Herrera.  Patient mentions he started having chills, malaise and fevers on Sunday 06/20, max temp that he had was 102.  He went to Dr. Herrera's office and was told to come to the ED.  Patient has been taking Tylenol 325 x2 every time he has fever. Patient denies any chest pain, abdominal pain, nausea, vomiting, diarrhea, constipation, urinary frequency or urgency or cough.  Patient drinks 4-6 beers on a daily basis.

## 2021-06-21 NOTE — PLAN
[FreeTextEntry1] : 1. Continued medications as outlined above.\par \par 2. The patient will now be sent to the emergency room for a workup to include CBC complete metabolic panel sedimentation rate, CRP, or viral panel, take panel, blood cultures, and chest x-ray. I have discussed the case with the physician's assistant on duty. Further recommendations will be made after the above noted studies are reviewed.\par \par 3. Followup as per the recommendations of the prior note.

## 2021-06-21 NOTE — H&P ADULT - NSHPREVIEWOFSYSTEMS_GEN_ALL_CORE
REVIEW OF SYSTEMS:    CONSTITUTIONAL: +weakness, +fevers, +chills  EYES/ENT: No visual changes;  No vertigo or throat pain   NECK: No pain or stiffness  RESPIRATORY: No cough, wheezing, hemoptysis; No shortness of breath  CARDIOVASCULAR: No chest pain or palpitations  GASTROINTESTINAL: No abdominal or epigastric pain. No nausea, vomiting, or hematemesis; No diarrhea or constipation. No melena or hematochezia.  GENITOURINARY: No dysuria, frequency or hematuria  NEUROLOGICAL: No numbness or weakness  SKIN: No itching, rashes

## 2021-06-21 NOTE — H&P ADULT - ASSESSMENT
69 yo Male with PMHx of CAD, s/p failed TAVR 2016, s/p Aortic valve replacement (mechanical) 2017, HTN, HLD was sent to the ED by Dr. Herrera.  Patient mentions he started having chills, malaise and fevers on Sunday 06/20, max temp that he had was 102.  He went to Dr. Herrera's office and was told to come to the ED.    #SIRS criteria  - Admit to Med/Surg  - Meets criteria with WBC 2.70, HR 95, Temp 102.5  - started on Cefepime 1gm q8h  - started on Vancomycin 1gm qd  - Lactate 1.3  - Patient hemodynamically stable no need to give IVF bolus at this time  - 100cc/hr IVF  - f/u ID consult  - f/u AM labs    #CAD  - s/p failed TAVR 2016  - s/p Aortic Mechanical valve replacement 2017  - On Coumadin 8mg qhs  - INR on admission 1.9  - Will need INR level 2.5-3.5  - f/u AM INR     #HTN  - Continue home losartan 100mg, amlodipine 5mg, and Metoprolol 100mg    #HLD  - Will change home Repatha SureClick 140 mg/ml to Atorvastatin 40mg qd   71 yo Male with PMHx of CAD, s/p failed TAVR 2016, s/p Aortic valve replacement (mechanical) 2017, HTN, HLD was sent to the ED by Dr. Herrera.  Patient mentions he started having chills, malaise and fevers on Sunday 06/20, max temp that he had was 102.  He went to Dr. Herrera's office and was told to come to the ED.    #SIRS criteria  - Admit to Med/Surg  - Meets criteria with WBC 2.70, HR 95, Temp 102.5  - started on Cefepime 1gm q8h  - started on Vancomycin 1gm qd  - Lactate 1.3  - Patient hemodynamically stable no need to give IVF bolus at this time  - 100cc/hr IVF  - f/u ID consult  - f/u Cardiology Consult  - f/u TTE  - f/u BCx and UCx  - f/u AM CBC, CMP, Mag, Phos, ESR, CRP, Tick Panel    #CAD  - s/p failed TAVR 2016  - s/p Aortic Mechanical valve replacement 2017  - On Coumadin 8mg qhs  - INR on admission 1.9  - Will need INR level 2.5-3.5  - f/u AM INR     #HTN  - Continue home losartan 100mg, amlodipine 5mg, and Metoprolol 100mg    #HLD  - Will change home Repatha SureClick 140 mg/ml to Atorvastatin 40mg qd    #DVT ppx  - Improve VTE score: 1    #Code status  - Full code  - Already on Coumadin    *Case discussed with Dr. North   69 yo Male with PMHx of CAD, s/p failed TAVR 2016, s/p Aortic valve replacement (mechanical) 2017, HTN, HLD was sent to the ED by Dr. Herrera.  Patient mentions he started having chills, malaise and fevers on Sunday 06/20, max temp that he had was 102.  He went to Dr. Herrera's office and was told to come to the ED.    #SIRS criteria  - Admit to Med/Surg  - Meets criteria with WBC 2.70, HR 95, Temp 102.5  - started on Cefepime 1gm q8h  - started on Vancomycin 1gm qd  - Lactate 1.3  - Patient hemodynamically stable no need to give IVF bolus at this time  - 100cc/hr IVF  - f/u ID consult  - f/u Cardiology Consult  - f/u TTE  - f/u BCx and UCx  - f/u AM CBC, CMP, Mag, Phos, ESR, CRP, Tick Panel    #CAD  - s/p failed TAVR 2016  - s/p Aortic Mechanical valve replacement 2017  - On Coumadin 8mg qhs  - INR on admission 1.9  - Will need INR level 2.5-3.5  - f/u AM INR     #Pancytopenia  - Possibly 2/2 Alcohol consumption  - f/u folate, CBC, Mag, Phos    #HTN  - Continue home losartan 100mg, amlodipine 5mg, and Metoprolol 100mg    #HLD  - Will change home Repatha SureClick 140 mg/ml to Atorvastatin 40mg qd    #DVT ppx  - Improve VTE score: 1    #Code status  - Full code  - Already on Coumadin    *Case discussed with Dr. North   69 yo Male with PMHx of CAD, s/p failed TAVR 2016, s/p Aortic valve replacement (mechanical) 2017, HTN, HLD was sent to the ED by Dr. Herrera.  Patient mentions he started having chills, malaise and fevers on Sunday 06/20, max temp that he had was 102.  He went to Dr. Herrera's office and was told to come to the ED.    #SIRS criteria  - Admit to Med/Surg  - Meets criteria with WBC 2.70, HR 95, Temp 102.5  - started on Cefepime 1gm q8h  - started on Vancomycin 1gm qd  - Lactate 1.3  - Patient hemodynamically stable no need to give IVF bolus at this time  - 75cc/hr IVF  - f/u ID consult  - f/u Cardiology Consult  - f/u TTE  - f/u BCx and UCx  - f/u AM CBC, CMP, Mag, Phos, ESR, CRP, Tick Panel    #CAD  - s/p failed TAVR 2016  - s/p Aortic Mechanical valve replacement 2017  - On Coumadin 8mg qhs  - INR on admission 1.9  - Will need INR level 2.5-3.5  - f/u AM INR     #Pancytopenia  - Possibly 2/2 Alcohol consumption  - f/u folate, CBC, Mag, Phos    #HTN  - Continue home losartan 100mg, amlodipine 5mg, and Metoprolol 100mg    #HLD  - Will change home Repatha SureClick 140 mg/ml to Atorvastatin 40mg qd    #DVT ppx  - Improve VTE score: 1    #Code status  - Full code  - Already on Coumadin    *Case discussed with Dr. North   71 yo Male with PMHx of CAD, s/p failed TAVR 2016, s/p Aortic valve replacement (mechanical) 2017, HTN, HLD was sent to the ED by Dr. Herrera.  Patient mentions he started having chills, malaise and fevers on Sunday 06/20, max temp that he had was 102.  He went to Dr. Herrera's office and was told to come to the ED.    #SIRS criteria  - Admit to Med/Surg  - Meets criteria with WBC 2.70, HR 95, Temp 102.5  - Unknown source of infection  - started on Cefepime 1gm q8h  - started on Vancomycin 1gm qd  - Lactate 1.3  - Patient hemodynamically stable no need to give IVF bolus at this time  - 75cc/hr IVF  - f/u ID consult  - f/u Cardiology Consult  - f/u TTE  - f/u BCx, UCx and RVP  - f/u AM CBC, CMP, Mag, Phos, ESR, CRP, Tick Panel    #CAD  - s/p failed TAVR 2016  - s/p Aortic Mechanical valve replacement 2017  - On Coumadin 8mg qhs  - INR on admission 1.9  - Will need INR level 2.5-3.5  - f/u AM INR     #Pancytopenia  - Possibly 2/2 Alcohol consumption  - f/u folate, CBC, Mag, Phos  - f/u Heme/onc as outpatient    #HTN  - Continue home losartan 100mg, amlodipine 5mg, and Metoprolol 100mg    #HLD  - Will change home Repatha SureClick 140 mg/ml to Atorvastatin 40mg qd    #DVT ppx  - Improve VTE score: 1  - Already on Coumadin    #Code status  - Full code    *Case discussed with Dr. North

## 2021-06-21 NOTE — ED ADULT NURSE NOTE - CAS EDN DISCHARGE INTERVENTIONS
Arm band on/IV intact/Admission wristband placed right wrist/Arm band on/IV intact/Admission wristband placed

## 2021-06-22 DIAGNOSIS — D69.6 THROMBOCYTOPENIA, UNSPECIFIED: ICD-10-CM

## 2021-06-22 DIAGNOSIS — I25.10 ATHEROSCLEROTIC HEART DISEASE OF NATIVE CORONARY ARTERY WITHOUT ANGINA PECTORIS: ICD-10-CM

## 2021-06-22 DIAGNOSIS — I47.1 SUPRAVENTRICULAR TACHYCARDIA: ICD-10-CM

## 2021-06-22 DIAGNOSIS — I10 ESSENTIAL (PRIMARY) HYPERTENSION: ICD-10-CM

## 2021-06-22 DIAGNOSIS — Z95.2 PRESENCE OF PROSTHETIC HEART VALVE: ICD-10-CM

## 2021-06-22 DIAGNOSIS — D72.819 DECREASED WHITE BLOOD CELL COUNT, UNSPECIFIED: ICD-10-CM

## 2021-06-22 DIAGNOSIS — R50.9 FEVER, UNSPECIFIED: ICD-10-CM

## 2021-06-22 DIAGNOSIS — N13.5 CROSSING VESSEL AND STRICTURE OF URETER WITHOUT HYDRONEPHROSIS: ICD-10-CM

## 2021-06-22 LAB
ALBUMIN SERPL ELPH-MCNC: 3.5 G/DL — SIGNIFICANT CHANGE UP (ref 3.3–5)
ALP SERPL-CCNC: 63 U/L — SIGNIFICANT CHANGE UP (ref 40–120)
ALT FLD-CCNC: 48 U/L — SIGNIFICANT CHANGE UP (ref 12–78)
ANION GAP SERPL CALC-SCNC: 7 MMOL/L — SIGNIFICANT CHANGE UP (ref 5–17)
APTT BLD: 147.2 SEC — CRITICAL HIGH (ref 27.5–35.5)
AST SERPL-CCNC: 53 U/L — HIGH (ref 15–37)
BASOPHILS # BLD AUTO: 0.02 K/UL — SIGNIFICANT CHANGE UP (ref 0–0.2)
BASOPHILS NFR BLD AUTO: 1 % — SIGNIFICANT CHANGE UP (ref 0–2)
BILIRUB SERPL-MCNC: 1 MG/DL — SIGNIFICANT CHANGE UP (ref 0.2–1.2)
BUN SERPL-MCNC: 13 MG/DL — SIGNIFICANT CHANGE UP (ref 7–23)
CALCIUM SERPL-MCNC: 8.4 MG/DL — LOW (ref 8.5–10.1)
CHLORIDE SERPL-SCNC: 98 MMOL/L — SIGNIFICANT CHANGE UP (ref 96–108)
CHOLEST SERPL-MCNC: 97 MG/DL — SIGNIFICANT CHANGE UP
CO2 SERPL-SCNC: 25 MMOL/L — SIGNIFICANT CHANGE UP (ref 22–31)
COVID-19 SPIKE DOMAIN AB INTERP: POSITIVE
COVID-19 SPIKE DOMAIN ANTIBODY RESULT: >250 U/ML — HIGH
CREAT SERPL-MCNC: 1.19 MG/DL — SIGNIFICANT CHANGE UP (ref 0.5–1.3)
CRP SERPL-MCNC: 82 MG/L — HIGH
CULTURE RESULTS: SIGNIFICANT CHANGE UP
EBV EA AB SER IA-ACNC: 43.6 U/ML — HIGH
EBV EA AB TITR SER IF: POSITIVE
EBV EA IGG SER-ACNC: POSITIVE
EBV NA IGG SER IA-ACNC: >600 U/ML — HIGH
EBV PATRN SPEC IB-IMP: SIGNIFICANT CHANGE UP
EBV VCA IGG AVIDITY SER QL IA: POSITIVE
EBV VCA IGM SER IA-ACNC: <10 U/ML — SIGNIFICANT CHANGE UP
EBV VCA IGM SER IA-ACNC: >750 U/ML — HIGH
EBV VCA IGM TITR FLD: NEGATIVE — SIGNIFICANT CHANGE UP
EOSINOPHIL # BLD AUTO: 0 K/UL — SIGNIFICANT CHANGE UP (ref 0–0.5)
EOSINOPHIL NFR BLD AUTO: 0 % — SIGNIFICANT CHANGE UP (ref 0–6)
GLUCOSE SERPL-MCNC: 127 MG/DL — HIGH (ref 70–99)
HCT VFR BLD CALC: 34.2 % — LOW (ref 39–50)
HCT VFR BLD CALC: 37.5 % — LOW (ref 39–50)
HCV AB S/CO SERPL IA: 0.1 S/CO — SIGNIFICANT CHANGE UP (ref 0–0.99)
HCV AB SERPL-IMP: SIGNIFICANT CHANGE UP
HDLC SERPL-MCNC: 44 MG/DL — SIGNIFICANT CHANGE UP
HGB BLD-MCNC: 12 G/DL — LOW (ref 13–17)
HGB BLD-MCNC: 13.2 G/DL — SIGNIFICANT CHANGE UP (ref 13–17)
INR BLD: 1.44 RATIO — HIGH (ref 0.88–1.16)
LACTATE SERPL-SCNC: 1.1 MMOL/L — SIGNIFICANT CHANGE UP (ref 0.7–2)
LIPID PNL WITH DIRECT LDL SERPL: 33 MG/DL — SIGNIFICANT CHANGE UP
LYME C6 AB IGG/IGM EIA REFLEX WESTERN BL: SIGNIFICANT CHANGE UP
LYMPHOCYTES # BLD AUTO: 0.35 K/UL — LOW (ref 1–3.3)
LYMPHOCYTES # BLD AUTO: 18 % — SIGNIFICANT CHANGE UP (ref 13–44)
MAGNESIUM SERPL-MCNC: 1.7 MG/DL — SIGNIFICANT CHANGE UP (ref 1.6–2.6)
MCHC RBC-ENTMCNC: 33 PG — SIGNIFICANT CHANGE UP (ref 27–34)
MCHC RBC-ENTMCNC: 33.6 PG — SIGNIFICANT CHANGE UP (ref 27–34)
MCHC RBC-ENTMCNC: 35.1 GM/DL — SIGNIFICANT CHANGE UP (ref 32–36)
MCHC RBC-ENTMCNC: 35.2 GM/DL — SIGNIFICANT CHANGE UP (ref 32–36)
MCV RBC AUTO: 93.8 FL — SIGNIFICANT CHANGE UP (ref 80–100)
MCV RBC AUTO: 95.8 FL — SIGNIFICANT CHANGE UP (ref 80–100)
MONOCYTES # BLD AUTO: 0.23 K/UL — SIGNIFICANT CHANGE UP (ref 0–0.9)
MONOCYTES NFR BLD AUTO: 12 % — SIGNIFICANT CHANGE UP (ref 2–14)
NEUTROPHILS # BLD AUTO: 1.29 K/UL — LOW (ref 1.8–7.4)
NEUTROPHILS NFR BLD AUTO: 67 % — SIGNIFICANT CHANGE UP (ref 43–77)
NON HDL CHOLESTEROL: 53 MG/DL — SIGNIFICANT CHANGE UP
NRBC # BLD: SIGNIFICANT CHANGE UP /100 WBCS (ref 0–0)
PHOSPHATE SERPL-MCNC: 2.3 MG/DL — LOW (ref 2.5–4.5)
PLATELET # BLD AUTO: 62 K/UL — LOW (ref 150–400)
PLATELET # BLD AUTO: 69 K/UL — LOW (ref 150–400)
POTASSIUM SERPL-MCNC: 3.8 MMOL/L — SIGNIFICANT CHANGE UP (ref 3.5–5.3)
POTASSIUM SERPL-SCNC: 3.8 MMOL/L — SIGNIFICANT CHANGE UP (ref 3.5–5.3)
PROT SERPL-MCNC: 7.1 GM/DL — SIGNIFICANT CHANGE UP (ref 6–8.3)
PROTHROM AB SERPL-ACNC: 16.4 SEC — HIGH (ref 10.6–13.6)
RAPID RVP RESULT: SIGNIFICANT CHANGE UP
RBC # BLD: 3.57 M/UL — LOW (ref 4.2–5.8)
RBC # BLD: 4 M/UL — LOW (ref 4.2–5.8)
RBC # FLD: 13.3 % — SIGNIFICANT CHANGE UP (ref 10.3–14.5)
RBC # FLD: 13.4 % — SIGNIFICANT CHANGE UP (ref 10.3–14.5)
SARS-COV-2 IGG+IGM SERPL QL IA: >250 U/ML — HIGH
SARS-COV-2 IGG+IGM SERPL QL IA: POSITIVE
SARS-COV-2 RNA SPEC QL NAA+PROBE: SIGNIFICANT CHANGE UP
SODIUM SERPL-SCNC: 130 MMOL/L — LOW (ref 135–145)
SPECIMEN SOURCE: SIGNIFICANT CHANGE UP
TRIGL SERPL-MCNC: 99 MG/DL — SIGNIFICANT CHANGE UP
WBC # BLD: 1.8 K/UL — LOW (ref 3.8–10.5)
WBC # BLD: 1.93 K/UL — LOW (ref 3.8–10.5)
WBC # FLD AUTO: 1.8 K/UL — LOW (ref 3.8–10.5)
WBC # FLD AUTO: 1.93 K/UL — LOW (ref 3.8–10.5)

## 2021-06-22 PROCEDURE — 99223 1ST HOSP IP/OBS HIGH 75: CPT

## 2021-06-22 PROCEDURE — 71250 CT THORAX DX C-: CPT | Mod: 26

## 2021-06-22 PROCEDURE — 93306 TTE W/DOPPLER COMPLETE: CPT | Mod: 26

## 2021-06-22 PROCEDURE — 99233 SBSQ HOSP IP/OBS HIGH 50: CPT | Mod: GC

## 2021-06-22 RX ORDER — CEFEPIME 1 G/1
1000 INJECTION, POWDER, FOR SOLUTION INTRAMUSCULAR; INTRAVENOUS ONCE
Refills: 0 | Status: COMPLETED | OUTPATIENT
Start: 2021-06-22 | End: 2021-06-22

## 2021-06-22 RX ORDER — THIAMINE MONONITRATE (VIT B1) 100 MG
100 TABLET ORAL DAILY
Refills: 0 | Status: DISCONTINUED | OUTPATIENT
Start: 2021-06-22 | End: 2021-06-25

## 2021-06-22 RX ORDER — HEPARIN SODIUM 5000 [USP'U]/ML
7000 INJECTION INTRAVENOUS; SUBCUTANEOUS ONCE
Refills: 0 | Status: COMPLETED | OUTPATIENT
Start: 2021-06-22 | End: 2021-06-22

## 2021-06-22 RX ORDER — ENOXAPARIN SODIUM 100 MG/ML
80 INJECTION SUBCUTANEOUS
Refills: 0 | Status: DISCONTINUED | OUTPATIENT
Start: 2021-06-22 | End: 2021-06-22

## 2021-06-22 RX ORDER — VANCOMYCIN HCL 1 G
1000 VIAL (EA) INTRAVENOUS ONCE
Refills: 0 | Status: COMPLETED | OUTPATIENT
Start: 2021-06-22 | End: 2021-06-22

## 2021-06-22 RX ORDER — VANCOMYCIN HCL 1 G
1000 VIAL (EA) INTRAVENOUS EVERY 12 HOURS
Refills: 0 | Status: DISCONTINUED | OUTPATIENT
Start: 2021-06-22 | End: 2021-06-22

## 2021-06-22 RX ORDER — HEPARIN SODIUM 5000 [USP'U]/ML
1300 INJECTION INTRAVENOUS; SUBCUTANEOUS
Qty: 25000 | Refills: 0 | Status: DISCONTINUED | OUTPATIENT
Start: 2021-06-22 | End: 2021-06-24

## 2021-06-22 RX ORDER — AMLODIPINE BESYLATE 2.5 MG/1
5 TABLET ORAL DAILY
Refills: 0 | Status: DISCONTINUED | OUTPATIENT
Start: 2021-06-21 | End: 2021-06-25

## 2021-06-22 RX ORDER — SODIUM,POTASSIUM PHOSPHATES 278-250MG
1 POWDER IN PACKET (EA) ORAL ONCE
Refills: 0 | Status: COMPLETED | OUTPATIENT
Start: 2021-06-22 | End: 2021-06-22

## 2021-06-22 RX ORDER — HEPARIN SODIUM 5000 [USP'U]/ML
7000 INJECTION INTRAVENOUS; SUBCUTANEOUS EVERY 6 HOURS
Refills: 0 | Status: DISCONTINUED | OUTPATIENT
Start: 2021-06-22 | End: 2021-06-24

## 2021-06-22 RX ORDER — WARFARIN SODIUM 2.5 MG/1
8 TABLET ORAL ONCE
Refills: 0 | Status: COMPLETED | OUTPATIENT
Start: 2021-06-22 | End: 2021-06-22

## 2021-06-22 RX ORDER — VANCOMYCIN HCL 1 G
VIAL (EA) INTRAVENOUS
Refills: 0 | Status: DISCONTINUED | OUTPATIENT
Start: 2021-06-22 | End: 2021-06-22

## 2021-06-22 RX ORDER — CEFEPIME 1 G/1
INJECTION, POWDER, FOR SOLUTION INTRAMUSCULAR; INTRAVENOUS
Refills: 0 | Status: DISCONTINUED | OUTPATIENT
Start: 2021-06-22 | End: 2021-06-23

## 2021-06-22 RX ORDER — ASPIRIN/CALCIUM CARB/MAGNESIUM 324 MG
81 TABLET ORAL DAILY
Refills: 0 | Status: DISCONTINUED | OUTPATIENT
Start: 2021-06-21 | End: 2021-06-25

## 2021-06-22 RX ORDER — CEFEPIME 1 G/1
INJECTION, POWDER, FOR SOLUTION INTRAMUSCULAR; INTRAVENOUS
Refills: 0 | Status: DISCONTINUED | OUTPATIENT
Start: 2021-06-22 | End: 2021-06-22

## 2021-06-22 RX ORDER — LOSARTAN POTASSIUM 100 MG/1
100 TABLET, FILM COATED ORAL DAILY
Refills: 0 | Status: DISCONTINUED | OUTPATIENT
Start: 2021-06-21 | End: 2021-06-25

## 2021-06-22 RX ORDER — SODIUM CHLORIDE 9 MG/ML
1000 INJECTION INTRAMUSCULAR; INTRAVENOUS; SUBCUTANEOUS
Refills: 0 | Status: DISCONTINUED | OUTPATIENT
Start: 2021-06-22 | End: 2021-06-25

## 2021-06-22 RX ORDER — HEPARIN SODIUM 5000 [USP'U]/ML
3500 INJECTION INTRAVENOUS; SUBCUTANEOUS EVERY 6 HOURS
Refills: 0 | Status: DISCONTINUED | OUTPATIENT
Start: 2021-06-22 | End: 2021-06-24

## 2021-06-22 RX ORDER — ATORVASTATIN CALCIUM 80 MG/1
80 TABLET, FILM COATED ORAL AT BEDTIME
Refills: 0 | Status: DISCONTINUED | OUTPATIENT
Start: 2021-06-21 | End: 2021-06-25

## 2021-06-22 RX ORDER — FOLIC ACID 0.8 MG
1 TABLET ORAL DAILY
Refills: 0 | Status: DISCONTINUED | OUTPATIENT
Start: 2021-06-22 | End: 2021-06-25

## 2021-06-22 RX ORDER — CEFEPIME 1 G/1
1000 INJECTION, POWDER, FOR SOLUTION INTRAMUSCULAR; INTRAVENOUS EVERY 8 HOURS
Refills: 0 | Status: DISCONTINUED | OUTPATIENT
Start: 2021-06-22 | End: 2021-06-23

## 2021-06-22 RX ORDER — METOPROLOL TARTRATE 50 MG
100 TABLET ORAL EVERY 12 HOURS
Refills: 0 | Status: DISCONTINUED | OUTPATIENT
Start: 2021-06-21 | End: 2021-06-25

## 2021-06-22 RX ORDER — HEPARIN SODIUM 5000 [USP'U]/ML
INJECTION INTRAVENOUS; SUBCUTANEOUS
Qty: 25000 | Refills: 0 | Status: DISCONTINUED | OUTPATIENT
Start: 2021-06-22 | End: 2021-06-22

## 2021-06-22 RX ORDER — ACETAMINOPHEN 500 MG
650 TABLET ORAL EVERY 4 HOURS
Refills: 0 | Status: DISCONTINUED | OUTPATIENT
Start: 2021-06-21 | End: 2021-06-25

## 2021-06-22 RX ADMIN — HEPARIN SODIUM 1300 UNIT(S)/HR: 5000 INJECTION INTRAVENOUS; SUBCUTANEOUS at 21:45

## 2021-06-22 RX ADMIN — CEFEPIME 1000 MILLIGRAM(S): 1 INJECTION, POWDER, FOR SOLUTION INTRAMUSCULAR; INTRAVENOUS at 14:20

## 2021-06-22 RX ADMIN — Medication 100 MILLIGRAM(S): at 21:46

## 2021-06-22 RX ADMIN — HEPARIN SODIUM 7000 UNIT(S): 5000 INJECTION INTRAVENOUS; SUBCUTANEOUS at 12:20

## 2021-06-22 RX ADMIN — AMLODIPINE BESYLATE 5 MILLIGRAM(S): 2.5 TABLET ORAL at 10:23

## 2021-06-22 RX ADMIN — Medication 100 MILLIGRAM(S): at 10:22

## 2021-06-22 RX ADMIN — Medication 650 MILLIGRAM(S): at 15:02

## 2021-06-22 RX ADMIN — Medication 250 MILLIGRAM(S): at 02:29

## 2021-06-22 RX ADMIN — Medication 81 MILLIGRAM(S): at 10:23

## 2021-06-22 RX ADMIN — Medication 1 PACKET(S): at 10:23

## 2021-06-22 RX ADMIN — SODIUM CHLORIDE 75 MILLILITER(S): 9 INJECTION INTRAMUSCULAR; INTRAVENOUS; SUBCUTANEOUS at 20:18

## 2021-06-22 RX ADMIN — WARFARIN SODIUM 8 MILLIGRAM(S): 2.5 TABLET ORAL at 00:22

## 2021-06-22 RX ADMIN — CEFEPIME 1000 MILLIGRAM(S): 1 INJECTION, POWDER, FOR SOLUTION INTRAMUSCULAR; INTRAVENOUS at 02:29

## 2021-06-22 RX ADMIN — ATORVASTATIN CALCIUM 80 MILLIGRAM(S): 80 TABLET, FILM COATED ORAL at 21:47

## 2021-06-22 RX ADMIN — LOSARTAN POTASSIUM 100 MILLIGRAM(S): 100 TABLET, FILM COATED ORAL at 10:23

## 2021-06-22 RX ADMIN — CEFEPIME 1000 MILLIGRAM(S): 1 INJECTION, POWDER, FOR SOLUTION INTRAMUSCULAR; INTRAVENOUS at 21:46

## 2021-06-22 RX ADMIN — Medication 1 TABLET(S): at 10:23

## 2021-06-22 RX ADMIN — SODIUM CHLORIDE 75 MILLILITER(S): 9 INJECTION INTRAMUSCULAR; INTRAVENOUS; SUBCUTANEOUS at 04:20

## 2021-06-22 RX ADMIN — CEFEPIME 1000 MILLIGRAM(S): 1 INJECTION, POWDER, FOR SOLUTION INTRAMUSCULAR; INTRAVENOUS at 05:54

## 2021-06-22 RX ADMIN — WARFARIN SODIUM 8 MILLIGRAM(S): 2.5 TABLET ORAL at 21:46

## 2021-06-22 RX ADMIN — HEPARIN SODIUM 0 UNIT(S)/HR: 5000 INJECTION INTRAVENOUS; SUBCUTANEOUS at 20:16

## 2021-06-22 RX ADMIN — HEPARIN SODIUM 1600 UNIT(S)/HR: 5000 INJECTION INTRAVENOUS; SUBCUTANEOUS at 12:22

## 2021-06-22 NOTE — CONSULT NOTE ADULT - SUBJECTIVE AND OBJECTIVE BOX
HPI:  69 yo Male with PMHx of CAD, s/p failed TAVR 2016, s/p Aortic valve replacement (mechanical) 2017, HTN, HLD was sent to the ED by Dr. Herrera.  Patient mentions he started having chills, malaise and fevers on , max temp that he had was 102.  He went to Dr. Herrera's office and was told to come to the ED.  Patient has been taking Tylenol 325 x2 every time he has fever. Patient denies any chest pain, abdominal pain, nausea, vomiting, diarrhea, constipation, urinary frequency or urgency or cough.  Patient drinks 4-6 beers on a daily basis. (2021 23:45)    No tick removed, but is in yard and garden, [employer] lawn.     : denies: cough, sputum, hemoptysis, CP, abd pain, vomiting, diarrhea, sinus pain, ear ache.     PAST MEDICAL & SURGICAL HISTORY:  Frequency of urination    ED (erectile dysfunction)    BPH (benign prostatic hyperplasia)    Ureteral stricture    H/O urethral stricture    HTN (hypertension)    H/O hyperlipidemia    PAT (paroxysmal atrial tachycardia)    Asthma    CAD (coronary artery disease)    H/O coronary artery bypass surgery    Aortic valve replaced  st judes    H/O inguinal hernia repair    H/O cystoscopy  x2        MEDICATIONS  (STANDING):  amLODIPine   Tablet 5 milliGRAM(s) Oral daily  aspirin enteric coated 81 milliGRAM(s) Oral daily  atorvastatin 80 milliGRAM(s) Oral at bedtime  cefepime  Injectable.      cefepime  Injectable. 1000 milliGRAM(s) IV Push every 8 hours  folic acid 1 milliGRAM(s) Oral daily  heparin  Infusion.  Unit(s)/Hr (16 mL/Hr) IV Continuous <Continuous>  losartan 100 milliGRAM(s) Oral daily  metoprolol tartrate 100 milliGRAM(s) Oral every 12 hours  multivitamin 1 Tablet(s) Oral daily  sodium chloride 0.9%. 1000 milliLiter(s) (75 mL/Hr) IV Continuous <Continuous>  thiamine 100 milliGRAM(s) Oral daily  warfarin 8 milliGRAM(s) Oral once    MEDICATIONS  (PRN):  acetaminophen   Tablet .. 650 milliGRAM(s) Oral every 4 hours PRN Temp greater or equal to 38C (100.4F)  heparin   Injectable 7000 Unit(s) IV Push every 6 hours PRN For aPTT less than 40  heparin   Injectable 3500 Unit(s) IV Push every 6 hours PRN For aPTT between 40 - 57      Allergies    No Known Allergies    Intolerances        SOCIAL HISTORY: Denies tobacco, etoh abuse or illicit drug use    FAMILY HISTORY:      Vital Signs Last 24 Hrs  T(C): 38.1 (2021 15:02), Max: 39.2 (2021 22:14)  T(F): 100.6 (2021 15:02), Max: 102.5 (2021 22:14)  HR: 70 (2021 15:10) (70 - 95)  BP: 107/62 (2021 15:10) (107/62 - 154/75)  BP(mean): --  RR: 18 (2021 15:10) (17 - 18)  SpO2: 97% (2021 15:10) (95% - 100%)    REVIEW OF SYSTEMS:    CONSTITUTIONAL:  As per HPI.  HEENT:  Eyes:  No diplopia or blurred vision. ENT:  No earache, sore throat or runny nose.  CARDIOVASCULAR:  No pressure, squeezing, tightness, heaviness or aching about the chest, neck, axilla or epigastrium.  RESPIRATORY:  No cough, shortness of breath, PND or orthopnea.  GASTROINTESTINAL:  No nausea, vomiting or diarrhea.  GENITOURINARY:  No dysuria, frequency or urgency.  MUSCULOSKELETAL:  As per HPI.  SKIN:  No change in skin, hair or nails.  NEUROLOGIC:  No paresthesias, fasciculations, seizures or weakness.  PSYCHIATRIC:  No disorder of thought or mood.  ENDOCRINE:  No heat or cold intolerance, polyuria or polydipsia.  HEMATOLOGICAL:  No easy bruising or bleedings:  .     PHYSICAL EXAMINATION:    GENERAL APPEARANCE:  Pt. is not currently dyspneic, in no distress. Pt. is alert, oriented, and pleasant.  HEENT:  Pupils are normal and react normally. No icterus. Mucous membranes well colored.  NECK:  Supple. No lymphadenopathy. Jugular venous pressure not elevated. Carotids equal.   HEART:   The cardiac impulse has a normal quality. Prothetic HS's.   CHEST:  Chest is clear to auscultation. Normal respiratory effort.  ABDOMEN:  Soft and nontender.   EXTREMITIES:  There is no cyanosis, clubbing or edema.   SKIN:  No rash. Few tiny scabbed lesion R leg.   Neuro: Alert, awake, and O x 3.      LABS:                        13.2   1.93  )-----------( 69       ( 2021 08:17 )             37.5         130<L>  |  98  |  13  ----------------------------<  127<H>  3.8   |  25  |  1.19    Ca    8.4<L>      2021 08:17  Phos  2.3       Mg     1.7         TPro  7.1  /  Alb  3.5  /  TBili  1.0  /  DBili  x   /  AST  53<H>  /  ALT  48  /  AlkPhos  63      LIVER FUNCTIONS - ( 2021 08:17 )  Alb: 3.5 g/dL / Pro: 7.1 gm/dL / ALK PHOS: 63 U/L / ALT: 48 U/L / AST: 53 U/L / GGT: x           PT/INR - ( 2021 08:17 )   PT: 16.4 sec;   INR: 1.44 ratio         PTT - ( 2021 19:22 )  PTT:35.4 sec      Urinalysis Basic - ( 2021 19:22 )    Color: Yellow / Appearance: Slightly Turbid / S.010 / pH: x  Gluc: x / Ketone: Small  / Bili: Negative / Urobili: Negative mg/dL   Blood: x / Protein: 30 mg/dL / Nitrite: Negative   Leuk Esterase: Negative / RBC: >50 /HPF / WBC Negative   Sq Epi: x / Non Sq Epi: Occasional / Bacteria: Occasional          RADIOLOGY & ADDITIONAL STUDIES:   EXAM:  CT CHEST                            PROCEDURE DATE:  2021          INTERPRETATION:  CLINICAL INFORMATION: Alcohol abuse with systemic inflammatory response syndrome    COMPARISON: Chest x-ray one day prior    CONTRAST/COMPLICATIONS:  IV Contrast: NONE  Oral Contrast: NONE  Complications: None reported at time of study completion    PROCEDURE:  CT of the Chest was performed.  Sagittal and coronal reformats were performed.    FINDINGS:    LUNGS AND AIRWAYS: The central airways are patent. The lungs are clear. Nonspecific 4 mm nodule in the right upper lobe.  PLEURA: No pleural abnormality.  VESSELS: Aortic atherosclerosis without aneurysm.  HEART: Status post CABG and aortic valve replacement. Normal heart size. No pericardial effusion. Coronary artery calcifications are present.  MEDIASTINUM AND THEODORE: No adenopathy.  CHEST WALL AND LOWER NECK: No masses.  VISUALIZED UPPER ABDOMEN: Limited visualization is unremarkable.  BONES: Status post sternotomy. No acute bony abnormality.    IMPRESSION:  *  No acute chest pathology.

## 2021-06-22 NOTE — CONSULT NOTE ADULT - ASSESSMENT
71 yo Male with PMHx of CAD, s/p failed TAVR 2016, s/p Aortic valve replacement (mechanical) 2017, HTN, HLD admitted on 6/21 for evaluation of fever, chills that started on 6/20. has no other specific complaints, no travel, no recent dental work, labs upon admission show leukopenia and thrombocytopenia. No known tick or bug bites, however, mows his lawn and in the garden.   1. Patient admitted with acute febrile illness, and lab abnormalities, low wbc and low platelets; however review of symptoms and physical essentially benign  - follow up cultures   - iv hydration and supportive care   - serial cbc and monitor temperature   - reviewed prior medical records to evaluate for resistant or atypical pathogens   - will check for tick borne illnesses, Babesia PCR, Ehrlichea PCR, Lyme serology with reflex Western Blot  - also check viral EBV serology  - will check echocardiogram to rule out vegetations on prosthetic heart valve  - will continue cefepime for now  - if workup all negative, may need hematology evaluation  2. other issues; per medicine 71 yo Male with PMHx of CAD, s/p failed TAVR 2016, s/p Aortic valve replacement (mechanical) 2017, HTN, HLD admitted on 6/21 for evaluation of fever, chills that started on 6/20. has no other specific complaints, no travel, no recent dental work, labs upon admission show leukopenia and thrombocytopenia. No known tick or bug bites, however, mows his lawn and in the garden.   1. Patient admitted with acute febrile illness, and lab abnormalities, low wbc and low platelets; however review of symptoms and physical essentially benign  - follow up cultures   - iv hydration and supportive care   - serial cbc and monitor temperature   - reviewed prior medical records to evaluate for resistant or atypical pathogens   - will check for tick borne illnesses, Babesia PCR, Ehrlichea PCR, Lyme serology with reflex Western Blot  - also check viral EBV serology  - will check echocardiogram to rule out vegetations on prosthetic heart valve  - will continue cefepime for now  - hold on further vancomycin for now  - if workup all negative, may need hematology evaluation  2. other issues; per medicine

## 2021-06-22 NOTE — CONSULT NOTE ADULT - SUBJECTIVE AND OBJECTIVE BOX
Patient is a 70y old  Male who presents with a chief complaint of Fever.       HPI:  69 yo Male with PMHx of CAD, s/p failed TAVR 2016, s/p Aortic valve replacement (mechanical) 2017, HTN, HLD was sent to the ED by Dr. Herrera.  Patient mentions he started having chills, malaise and fevers on , max temp that he had was 102.  He went to Dr. Herrera's office and was told to come to the ED.  Patient has been taking Tylenol 325 x2 every time he has fever. Patient denies any chest pain, abdominal pain, nausea, vomiting, diarrhea, constipation, urinary frequency or urgency or cough.  Patient drinks 4-6 beers on a daily basis. (2021 23:45)      PAST MEDICAL & SURGICAL HISTORY:  Frequency of urination    ED (erectile dysfunction)    BPH (benign prostatic hyperplasia)    Ureteral stricture    H/O urethral stricture    HTN (hypertension)    H/O hyperlipidemia    PAT (paroxysmal atrial tachycardia)    Asthma    CAD (coronary artery disease)    H/O coronary artery bypass surgery    Aortic valve replaced  [facility]    H/O inguinal hernia repair    H/O cystoscopy  x2        MEDICATIONS  (STANDING):  amLODIPine   Tablet 5 milliGRAM(s) Oral daily  aspirin enteric coated 81 milliGRAM(s) Oral daily  atorvastatin 80 milliGRAM(s) Oral at bedtime  cefepime  Injectable.      cefepime  Injectable. 1000 milliGRAM(s) IV Push every 8 hours  heparin   Injectable 7000 Unit(s) IV Push once  heparin  Infusion.  Unit(s)/Hr (16 mL/Hr) IV Continuous <Continuous>  losartan 100 milliGRAM(s) Oral daily  metoprolol tartrate 100 milliGRAM(s) Oral every 12 hours  multivitamin 1 Tablet(s) Oral daily  sodium chloride 0.9%. 1000 milliLiter(s) (75 mL/Hr) IV Continuous <Continuous>  vancomycin  IVPB 1000 milliGRAM(s) IV Intermittent every 12 hours  vancomycin  IVPB        MEDICATIONS  (PRN):  acetaminophen   Tablet .. 650 milliGRAM(s) Oral every 4 hours PRN Temp greater or equal to 38C (100.4F)  heparin   Injectable 7000 Unit(s) IV Push every 6 hours PRN For aPTT less than 40  heparin   Injectable 3500 Unit(s) IV Push every 6 hours PRN For aPTT between 40 - 57      FAMILY HISTORY:      SOCIAL HISTORY:    REVIEW OF SYSTEMS:  CONSTITUTIONAL:    No fatigue, malaise, lethargy.  No fever or chills.  HEENT:  Eyes:  No visual changes.     ENT:  No epistaxis.  No sinus pain.    RESPIRATORY:  No cough.  No wheeze.  No hemoptysis.  No shortness of breath.  CARDIOVASCULAR:  No chest pains.  No palpitations. No shortness of breath, No orthopnea or PND.  GASTROINTESTINAL:  No abdominal pain.  No nausea or vomiting.    GENITOURINARY:    No hematuria.    MUSCULOSKELETAL:  No musculoskeletal pain.  No joint swelling.  No arthritis.  NEUROLOGICAL:  No tingling or numbness or weakness.  PSYCHIATRIC:  No confusion  SKIN:  No rashes.    ENDOCRINE:  No unexplained weight loss.  No polydipsia.   HEMATOLOGIC:  No anemia.  No prolonged or excessive bleeding.   ALLERGIC AND IMMUNOLOGIC:  No pruritus.          Vital Signs Last 24 Hrs  T(C): 37.4 (2021 07:50), Max: 39.2 (2021 22:14)  T(F): 99.3 (2021 07:50), Max: 102.5 (2021 22:14)  HR: 81 (2021 10:21) (72 - 95)  BP: 116/78 (2021 10:21) (116/78 - 154/75)  BP(mean): 101 (2021 18:32) (101 - 101)  RR: 17 (2021 07:50) (17 - 18)  SpO2: 95% (2021 07:50) (95% - 100%)    PHYSICAL EXAM-    Constitutional: The patient appears to be normal, well developed, well nourished and alert and oriented to time, place and person. The patient does not appear acutely ill.     Head: Head is normocephalic and atraumatic.      Neck: No jugular venous distention. No audible carotid bruits. There are strong carotid pulses bilaterally. No JVD.     Cardiovascular: Regular rate and rhythm without S3, S4. No murmurs or rubs are appreciated.      Respiratory: Breathsounds are normal. No rales. No wheezing.    Abdomen: Soft, nontender, nondistended with positive bowel sounds.      Extremity: No tenderness. No  pitting edema     Neurologic: The patient is alert and oriented.      Skin: No rash, no obvious lesions noted.      Psychiatric: The patient appears to be emotionally stable.      INTERPRETATION OF TELEMETRY:    ECG: Sinus rythm , normal axis, no ST T changes.     I&O's Detail    2021 07:01  -  2021 07:00  --------------------------------------------------------  IN:    Oral Fluid: 1000 mL  Total IN: 1000 mL    OUT:  Total OUT: 0 mL    Total NET: 1000 mL          LABS:                        13.2   1.93  )-----------( 69       ( 2021 08:17 )             37.5         130<L>  |  98  |  13  ----------------------------<  127<H>  3.8   |  25  |  1.19    Ca    8.4<L>      2021 08:17  Phos  2.3       Mg     1.7         TPro  7.1  /  Alb  3.5  /  TBili  1.0  /  DBili  x   /  AST  53<H>  /  ALT  48  /  AlkPhos  63  -22        PT/INR - ( 2021 08:17 )   PT: 16.4 sec;   INR: 1.44 ratio         PTT - ( 2021 19:22 )  PTT:35.4 sec  Urinalysis Basic - ( 2021 19:22 )    Color: Yellow / Appearance: Slightly Turbid / S.010 / pH: x  Gluc: x / Ketone: Small  / Bili: Negative / Urobili: Negative mg/dL   Blood: x / Protein: 30 mg/dL / Nitrite: Negative   Leuk Esterase: Negative / RBC: >50 /HPF / WBC Negative   Sq Epi: x / Non Sq Epi: Occasional / Bacteria: Occasional      I&O's Summary    2021 07:01  -  2021 07:00  --------------------------------------------------------  IN: 1000 mL / OUT: 0 mL / NET: 1000 mL      BNP  RADIOLOGY & ADDITIONAL STUDIES: Patient is a 70y old  Male who presents with a chief complaint of Fever.       HPI:  69 yo Male with PMHx of CAD, s/p failed TAVR 2016, s/p Aortic valve replacement (mechanical) 2017, HTN, HLD was sent to the ED by Dr. Herrera.  Patient mentions he started having chills, malaise and fevers on , max temp that he had was 102.  He went to Dr. Herrera's office and was told to come to the ED.  Patient has been taking Tylenol 325 x2 every time he has fever. Patient denies any chest pain, abdominal pain, nausea, vomiting, diarrhea, constipation, urinary frequency or urgency or cough.  Patient drinks 4-6 beers on a daily basis.     Pt seen this am.  no CP or SOB.       PAST MEDICAL & SURGICAL HISTORY:  Frequency of urination    ED (erectile dysfunction)    BPH (benign prostatic hyperplasia)    Ureteral stricture    H/O urethral stricture    HTN (hypertension)    H/O hyperlipidemia    PAT (paroxysmal atrial tachycardia)    Asthma    CAD (coronary artery disease)    H/O coronary artery bypass surgery    Aortic valve replaced  [facility]    H/O inguinal hernia repair    H/O cystoscopy  x2        MEDICATIONS  (STANDING):  amLODIPine   Tablet 5 milliGRAM(s) Oral daily  aspirin enteric coated 81 milliGRAM(s) Oral daily  atorvastatin 80 milliGRAM(s) Oral at bedtime  cefepime  Injectable.      cefepime  Injectable. 1000 milliGRAM(s) IV Push every 8 hours  heparin   Injectable 7000 Unit(s) IV Push once  heparin  Infusion.  Unit(s)/Hr (16 mL/Hr) IV Continuous <Continuous>  losartan 100 milliGRAM(s) Oral daily  metoprolol tartrate 100 milliGRAM(s) Oral every 12 hours  multivitamin 1 Tablet(s) Oral daily  sodium chloride 0.9%. 1000 milliLiter(s) (75 mL/Hr) IV Continuous <Continuous>  vancomycin  IVPB 1000 milliGRAM(s) IV Intermittent every 12 hours  vancomycin  IVPB        MEDICATIONS  (PRN):  acetaminophen   Tablet .. 650 milliGRAM(s) Oral every 4 hours PRN Temp greater or equal to 38C (100.4F)  heparin   Injectable 7000 Unit(s) IV Push every 6 hours PRN For aPTT less than 40  heparin   Injectable 3500 Unit(s) IV Push every 6 hours PRN For aPTT between 40 - 57      FAMILY HISTORY: No family history of premature CAD or SCD.       SOCIAL HISTORY: no recent smoking     REVIEW OF SYSTEMS:  CONSTITUTIONAL:    No fatigue, malaise, lethargy.  c/o fever or chills.   RESPIRATORY:  No cough.  No wheeze.  No hemoptysis.  No shortness of breath.  CARDIOVASCULAR:  No chest pains.  No palpitations. No shortness of breath, No orthopnea or PND.  GASTROINTESTINAL:  No abdominal pain.  No nausea or vomiting.    GENITOURINARY:    No hematuria.    MUSCULOSKELETAL:  No musculoskeletal pain.  No joint swelling.  No arthritis.  NEUROLOGICAL:  No tingling or numbness or weakness.  PSYCHIATRIC:  No confusion  SKIN:  No rashes.          Vital Signs Last 24 Hrs  T(C): 37.4 (2021 07:50), Max: 39.2 (2021 22:14)  T(F): 99.3 (2021 07:50), Max: 102.5 (2021 22:14)  HR: 81 (2021 10:21) (72 - 95)  BP: 116/78 (2021 10:21) (116/78 - 154/75)  BP(mean): 101 (2021 18:32) (101 - 101)  RR: 17 (2021 07:50) (17 - 18)  SpO2: 95% (2021 07:50) (95% - 100%)    PHYSICAL EXAM-    Constitutional: The patient appears to be normal, well developed, well nourished and alert and oriented to time, place and person. The patient does not appear acutely ill.     Head: Head is normocephalic and atraumatic.      Neck: No jugular venous distention. No audible carotid bruits. There are strong carotid pulses bilaterally. No JVD.     Cardiovascular: Regular rate and rhythm without S3, S4. No murmurs or rubs are appreciated.   Mechanical heart sounds +     Respiratory: Breathsounds are normal. No rales. No wheezing.    Abdomen: Soft, nontender, nondistended with positive bowel sounds.      Extremity: No tenderness. No  pitting edema     Neurologic: The patient is alert and oriented.      Skin: No rash, no obvious lesions noted.      Psychiatric: The patient appears to be emotionally stable.      INTERPRETATION OF TELEMETRY: no ton     ECG: Sinus rythm, no ST T changes.     I&O's Detail    2021 07:01  -  2021 07:00  --------------------------------------------------------  IN:    Oral Fluid: 1000 mL  Total IN: 1000 mL    OUT:  Total OUT: 0 mL    Total NET: 1000 mL          LABS:                        13.2   1.93  )-----------( 69       ( 2021 08:17 )             37.5     06-22    130<L>  |  98  |  13  ----------------------------<  127<H>  3.8   |  25  |  1.19    Ca    8.4<L>      2021 08:17  Phos  2.3       Mg     1.7         TPro  7.1  /  Alb  3.5  /  TBili  1.0  /  DBili  x   /  AST  53<H>  /  ALT  48  /  AlkPhos  63  22        PT/INR - ( 2021 08:17 )   PT: 16.4 sec;   INR: 1.44 ratio         PTT - ( 2021 19:22 )  PTT:35.4 sec  Urinalysis Basic - ( 2021 19:22 )    Color: Yellow / Appearance: Slightly Turbid / S.010 / pH: x  Gluc: x / Ketone: Small  / Bili: Negative / Urobili: Negative mg/dL   Blood: x / Protein: 30 mg/dL / Nitrite: Negative   Leuk Esterase: Negative / RBC: >50 /HPF / WBC Negative   Sq Epi: x / Non Sq Epi: Occasional / Bacteria: Occasional      I&O's Summary    2021 07:01  -  2021 07:00  --------------------------------------------------------  IN: 1000 mL / OUT: 0 mL / NET: 1000 mL      BNP  RADIOLOGY & ADDITIONAL STUDIES:

## 2021-06-22 NOTE — PROGRESS NOTE ADULT - ASSESSMENT
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 Herrera's office for chills, malase and fever since 6/20/21. TMax of 102.      #SIRS Criteria  - Resolved, Examined at 7 AM this morning, No longer meets SIRS  - Unknown source of infection  - Infectious Disease consult appreciated: Continue Cefepime, S/P 1 dose of Vancomycin  - Tick bourne Illness, Babesia, Erlichiea, Lyme Western Blot, Viral EBV, Echocardiogram  - Follow up Blood, Urine Cultures, Rapid Viral Panel  - Cardiology Consult Appreciated Echo for Endocarditis rule out. No Janeway Lesions, Osler Nodes. Discussed need for Echo with Cardiologist.    #Pancytopenia  - Possibly due to JODI Consumption  - Follow up Folate levels  - Phosphate decreased, replenished, continue to follow  - Follow up Heme/Onc Consult     #CAD  - Failed TAVR, Aortic mechanical valve replacement 2017  - Coumadin 8 QHS, INR 1.4 this AM. 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 Herrera's office for chills, malase and fever since 6/20/21. TMax of 102.      #SIRS Criteria  - Resolved, Examined at 7 AM this morning, No longer meets SIRS  - Unknown source of infection  - Infectious Disease consult appreciated: Continue Cefepime, S/P 1 dose of Vancomycin  - Tick bourne Illness, Babesia, Erlichiea, Lyme Western Blot, Viral EBV, Echocardiogram  - Follow up Blood, Urine Cultures, Rapid Viral Panel  - Cardiology Consult Appreciated Echo for Endocarditis rule out. No Janeway Lesions, Osler Nodes. Discussed need for Echo with Cardiologist.    #Pancytopenia  - Possibly due to JODI Consumption  - Follow up Folate levels  - Phosphate decreased, replenished, continue to follow  - Follow up Heme/Onc Consult   - Follow up CBC @ 19:00, AM.     #CAD  - Failed TAVR, Aortic mechanical valve replacement 2017  - Coumadin 8 QHS, INR 1.4 this AM. Target 2.5-3.5  - Heparin GGT, Watch Platelets HOLD if <50    #HTN/HLD  - Continue Home Losartan, Amlodipine, Metoprolol,  - Continue Atorvastatin 40    #Diet  Diet, DASH/TLC:     #DVT PPX  - Heparin Drip  - Coumadin 8 tonight  - PT/INR in AM    #Code Status   - Full Code    #Dispo  - Pending Cultures, Clinical Improvement

## 2021-06-22 NOTE — CONSULT NOTE ADULT - SUBJECTIVE AND OBJECTIVE BOX
Patient is a 70y old  Male who presents with a chief complaint of Fever (2021 11:44)    HPI:  71 yo Male with PMHx of CAD, s/p failed TAVR 2016, s/p Aortic valve replacement (mechanical) 2017, HTN, HLD admitted on  for evaluation of fever, chills that started on . has no other specific complaints, no travel, no recent dental work, labs upon admission show leukopenia and thrombocytopenia. No known tick or bug bites, however, mows his lawn and in the garden.         PMH: as above  PSH: as above  Meds: per reconciliation sheet, noted below  MEDICATIONS  (STANDING):  amLODIPine   Tablet 5 milliGRAM(s) Oral daily  aspirin enteric coated 81 milliGRAM(s) Oral daily  atorvastatin 80 milliGRAM(s) Oral at bedtime  cefepime  Injectable.      cefepime  Injectable. 1000 milliGRAM(s) IV Push every 8 hours  heparin  Infusion.  Unit(s)/Hr (16 mL/Hr) IV Continuous <Continuous>  losartan 100 milliGRAM(s) Oral daily  metoprolol tartrate 100 milliGRAM(s) Oral every 12 hours  multivitamin 1 Tablet(s) Oral daily  sodium chloride 0.9%. 1000 milliLiter(s) (75 mL/Hr) IV Continuous <Continuous>  vancomycin  IVPB 1000 milliGRAM(s) IV Intermittent every 12 hours  vancomycin  IVPB        MEDICATIONS  (PRN):  acetaminophen   Tablet .. 650 milliGRAM(s) Oral every 4 hours PRN Temp greater or equal to 38C (100.4F)  heparin   Injectable 7000 Unit(s) IV Push every 6 hours PRN For aPTT less than 40  heparin   Injectable 3500 Unit(s) IV Push every 6 hours PRN For aPTT between 40 - 57    Allergies    No Known Allergies    Intolerances      Social: no smoking, positive alcohol, no illegal drugs; no recent travel, no exposure to TB  FAMILY HISTORY:     no history of premature cardiovascular disease in first degree relatives  ROS: the patient has no HA, no dizziness, no sore throat, no blurry vision, no CP, no palpitations, no SOB, no cough, no abdominal pain, no diarrhea, no N/V, no dysuria, no leg pain, no claudication, no rash, no joint aches, no rectal pain or bleeding, no night sweats  All other systems reviewed and are negative    Vital Signs Last 24 Hrs  T(C): 37.4 (2021 07:50), Max: 39.2 (2021 22:14)  T(F): 99.3 (2021 07:50), Max: 102.5 (2021 22:14)  HR: 81 (2021 10:21) (72 - 95)  BP: 116/78 (2021 10:21) (116/78 - 154/75)  BP(mean): 101 (2021 18:32) (101 - 101)  RR: 17 (2021 07:50) (17 - 18)  SpO2: 95% (2021 07:50) (95% - 100%)  Daily Height in cm: 180.34 (2021 18:28)    Daily     PE:    Constitutional: frail looking  HEENT: NC/AT, EOMI, PERRLA, conjunctivae clear; ears and nose atraumatic; pharynx clear  Neck: supple; thyroid not palpable  Back: no tenderness  Respiratory: respiratory effort normal; clear to auscultation  Cardiovascular: S1S2 regular, no murmurs  Abdomen: soft, not tender, not distended, positive BS; no liver or spleen organomegaly  Genitourinary: no suprapubic tenderness  Musculoskeletal: no muscle tenderness, no joint swelling or tenderness  Neurological/ Psychiatric: AxOx3, judgement and insight normal;  moving all extremities  Skin: no rashes; no palpable lesions    Labs: all available labs reviewed                        13.2   1.93  )-----------( 69       ( 2021 08:17 )             37.5     06-22    130<L>  |  98  |  13  ----------------------------<  127<H>  3.8   |  25  |  1.19    Ca    8.4<L>      2021 08:17  Phos  2.3     06-  Mg     1.7     06-    TPro  7.1  /  Alb  3.5  /  TBili  1.0  /  DBili  x   /  AST  53<H>  /  ALT  48  /  AlkPhos  63  06-22     LIVER FUNCTIONS - ( 2021 08:17 )  Alb: 3.5 g/dL / Pro: 7.1 gm/dL / ALK PHOS: 63 U/L / ALT: 48 U/L / AST: 53 U/L / GGT: x           Urinalysis Basic - ( 2021 19:22 )    Color: Yellow / Appearance: Slightly Turbid / S.010 / pH: x  Gluc: x / Ketone: Small  / Bili: Negative / Urobili: Negative mg/dL   Blood: x / Protein: 30 mg/dL / Nitrite: Negative   Leuk Esterase: Negative / RBC: >50 /HPF / WBC Negative   Sq Epi: x / Non Sq Epi: Occasional / Bacteria: Occasional    < from: CT Chest No Cont (21 @ 08:27) >    IMPRESSION:  *  No acute chest pathology.      < end of copied text >        Radiology: all available radiological tests reviewed    Advanced directives addressed: full resuscitation

## 2021-06-22 NOTE — PHARMACOTHERAPY INTERVENTION NOTE - COMMENTS
Patient requires full anticoagulation due to mechanical aortic valve replacement, on coumadin at home. Patient INR currently 1.44. Spoke with Dr. Barton and Dr. Mccormick, they wish to start therapeutic heparin drip. Patient's platelets are currently 69, doctors are aware and will monitor CBC and stop drip if platelets fall below 50.

## 2021-06-22 NOTE — PROGRESS NOTE ADULT - ASSESSMENT
Pt has been seen and examined with FP resident, resident supervised agree with a/p       Patient is a 70y old  Male who presents with a chief complaint of Fever (21 Jun 2021 23:45)      HPI:  71 yo Male with PMHx of CAD, s/p failed TAVR 2016, s/p Aortic valve replacement (mechanical) 2017, HTN, HLD was sent to the ED by Dr. Herrera.  Patient mentions he started having chills, malaise and fevers on Sunday 06/20, max temp that he had was 102.  He went to Dr. Herrera's office and was told to come to the ED.      PHYSICAL EXAM:  Vital Signs Last 24 Hrs  T(C): 37.4 (22 Jun 2021 07:50), Max: 39.2 (21 Jun 2021 22:14)  T(F): 99.3 (22 Jun 2021 07:50), Max: 102.5 (21 Jun 2021 22:14)  HR: 81 (22 Jun 2021 10:21) (72 - 95)  BP: 116/78 (22 Jun 2021 10:21) (116/78 - 154/75)  BP(mean): 101 (21 Jun 2021 18:32) (101 - 101)  RR: 17 (22 Jun 2021 07:50) (17 - 18)  SpO2: 95% (22 Jun 2021 07:50) (95% - 100%)  -rs-aeeb, cta  -cvs-s1s2 normal   -p/a-soft,bs+      A/P    #Fever -? source, ID evaluation     #TTE to follow     #start therapeutic heparin in view of low INR and platelet, monitor cbc closely, ct coumadin -mechanical metallic valve

## 2021-06-22 NOTE — PROGRESS NOTE ADULT - SUBJECTIVE AND OBJECTIVE BOX
69 YO Male With a PMH of Frequency of urination, ED, BPH, Ureteral stricture, HTN, hyperlipidemia, PAT, Asthma, CAD, coronary artery bypass surgery, Aortic valve replacement, inguinal hernia repair, cystoscopy who presents with a chief complaint of Fever     Subjective: Patient was seen and examined by me this morning at bedside. He is in good spirits, but is curious about his fever.     REVIEW OF SYSTEMS:  CONSTITUTIONAL: No weakness, fevers or chills currently.   EYES/ENT: No visual changes;  No vertigo or throat pain   NECK: No pain or stiffness  RESPIRATORY: No cough, wheezing, hemoptysis; No shortness of breath  CARDIOVASCULAR: No chest pain or palpitations  GASTROINTESTINAL: No abdominal or epigastric pain. No nausea, vomiting; No diarrhea or constipation.   GENITOURINARY: No dysuria, frequency or hematuria  NEUROLOGICAL: No numbness or weakness  SKIN: No itching, burning, rashes, or lesions     PHYSICAL EXAM:  Vital Signs Last 24 Hrs  T(C): 37.4 (22 Jun 2021 07:50), Max: 39.2 (21 Jun 2021 22:14)  T(F): 99.3 (22 Jun 2021 07:50), Max: 102.5 (21 Jun 2021 22:14)  HR: 81 (22 Jun 2021 10:21) (72 - 95)  BP: 116/78 (22 Jun 2021 10:21) (116/78 - 154/75)  BP(mean): 101 (21 Jun 2021 18:32) (101 - 101)  RR: 17 (22 Jun 2021 07:50) (17 - 18)  SpO2: 95% (22 Jun 2021 07:50) (95% - 100%)    Constitutional: Pt lying in bed, awake and alert, NAD  HEENT: EOMI, normal hearing, moist mucous membranes  Neck: Soft and supple, no JVD  Respiratory: CTABL, No wheezing, rales or rhonchi  Cardiovascular: S1S2+, RRR, no M/G/R  Gastrointestinal: BS+, soft, NT/ND, no guarding, no rebound  Extremities: No peripheral edema  Vascular: 2+ peripheral pulses  Neurological: AAOx3, no focal deficits  Musculoskeletal: 5/5 strength b/l upper and lower extremities  Skin: No rashes    MEDICATIONS:  MEDICATIONS  (STANDING):  amLODIPine   Tablet 5 milliGRAM(s) Oral daily  aspirin enteric coated 81 milliGRAM(s) Oral daily  atorvastatin 80 milliGRAM(s) Oral at bedtime  cefepime  Injectable.      cefepime  Injectable. 1000 milliGRAM(s) IV Push every 8 hours  heparin  Infusion.  Unit(s)/Hr (16 mL/Hr) IV Continuous <Continuous>  losartan 100 milliGRAM(s) Oral daily  metoprolol tartrate 100 milliGRAM(s) Oral every 12 hours  multivitamin 1 Tablet(s) Oral daily  sodium chloride 0.9%. 1000 milliLiter(s) (75 mL/Hr) IV Continuous <Continuous>  warfarin 8 milliGRAM(s) Oral once    MEDICATIONS  (PRN):  acetaminophen   Tablet .. 650 milliGRAM(s) Oral every 4 hours PRN Temp greater or equal to 38C (100.4F)  heparin   Injectable 7000 Unit(s) IV Push every 6 hours PRN For aPTT less than 40  heparin   Injectable 3500 Unit(s) IV Push every 6 hours PRN For aPTT between 40 - 57      LABS: All Labs Reviewed:                        13.2   1.93  )-----------( 69       ( 22 Jun 2021 08:17 )             37.5     06-22    130<L>  |  98  |  13  ----------------------------<  127<H>  3.8   |  25  |  1.19    Ca    8.4<L>      22 Jun 2021 08:17  Phos  2.3     06-22  Mg     1.7     06-22    TPro  7.1  /  Alb  3.5  /  TBili  1.0  /  DBili  x   /  AST  53<H>  /  ALT  48  /  AlkPhos  63  06-22    PT/INR - ( 22 Jun 2021 08:17 )   PT: 16.4 sec;   INR: 1.44 ratio         PTT - ( 21 Jun 2021 19:22 )  PTT:35.4 sec      Blood Culture:   I&O's Summary    21 Jun 2021 07:01  -  22 Jun 2021 07:00  --------------------------------------------------------  IN: 1000 mL / OUT: 0 mL / NET: 1000 mL      CAPILLARY BLOOD GLUCOSE          RADIOLOGY/EKG:   69 YO Male With a PMH of Frequency of urination, ED, BPH, Ureteral stricture, HTN, hyperlipidemia, PAT, Asthma, CAD, coronary artery bypass surgery, Aortic valve replacement, inguinal hernia repair, cystoscopy who presents with a chief complaint of Fever     Subjective: Patient was seen and examined by me this morning at bedside. He is in good spirits, but is curious about his fever.     REVIEW OF SYSTEMS:  CONSTITUTIONAL: No weakness, fevers or chills currently.   EYES/ENT: No visual changes;  No vertigo or throat pain   NECK: No pain or stiffness  RESPIRATORY: No cough, wheezing, hemoptysis; No shortness of breath  CARDIOVASCULAR: No chest pain or palpitations  GASTROINTESTINAL: No abdominal or epigastric pain. No nausea, vomiting; No diarrhea or constipation.   GENITOURINARY: No dysuria, frequency or hematuria  NEUROLOGICAL: No numbness or weakness  SKIN: No itching, burning, rashes, or lesions.     PHYSICAL EXAM:  Vital Signs Last 24 Hrs  T(C): 37.4 (22 Jun 2021 07:50), Max: 39.2 (21 Jun 2021 22:14)  T(F): 99.3 (22 Jun 2021 07:50), Max: 102.5 (21 Jun 2021 22:14)  HR: 81 (22 Jun 2021 10:21) (72 - 95)  BP: 116/78 (22 Jun 2021 10:21) (116/78 - 154/75)  BP(mean): 101 (21 Jun 2021 18:32) (101 - 101)  RR: 17 (22 Jun 2021 07:50) (17 - 18)  SpO2: 95% (22 Jun 2021 07:50) (95% - 100%)    Constitutional: Pt lying in bed, awake and alert, NAD  HEENT: EOMI, normal hearing, moist mucous membranes  Neck: Soft and supple, no JVD  Respiratory: CTABL, No wheezing, rales or rhonchi  Cardiovascular: S1S2+, RRR, no M/G/R  Gastrointestinal: BS+, soft, NT/ND, no guarding, no rebound  Extremities: No peripheral edema  Vascular: 2+ peripheral pulses  Neurological: AAOx3, no focal deficits  Musculoskeletal: 5/5 strength b/l upper and lower extremities  Skin: No rashes, No osler nodes, janeway lesions.     MEDICATIONS:  MEDICATIONS  (STANDING):  amLODIPine   Tablet 5 milliGRAM(s) Oral daily  aspirin enteric coated 81 milliGRAM(s) Oral daily  atorvastatin 80 milliGRAM(s) Oral at bedtime  cefepime  Injectable.      cefepime  Injectable. 1000 milliGRAM(s) IV Push every 8 hours  heparin  Infusion.  Unit(s)/Hr (16 mL/Hr) IV Continuous <Continuous>  losartan 100 milliGRAM(s) Oral daily  metoprolol tartrate 100 milliGRAM(s) Oral every 12 hours  multivitamin 1 Tablet(s) Oral daily  sodium chloride 0.9%. 1000 milliLiter(s) (75 mL/Hr) IV Continuous <Continuous>  warfarin 8 milliGRAM(s) Oral once    MEDICATIONS  (PRN):  acetaminophen   Tablet .. 650 milliGRAM(s) Oral every 4 hours PRN Temp greater or equal to 38C (100.4F)  heparin   Injectable 7000 Unit(s) IV Push every 6 hours PRN For aPTT less than 40  heparin   Injectable 3500 Unit(s) IV Push every 6 hours PRN For aPTT between 40 - 57      LABS: All Labs Reviewed:                        13.2   1.93  )-----------( 69       ( 22 Jun 2021 08:17 )             37.5     06-22    130<L>  |  98  |  13  ----------------------------<  127<H>  3.8   |  25  |  1.19    Ca    8.4<L>      22 Jun 2021 08:17  Phos  2.3     06-22  Mg     1.7     06-22    TPro  7.1  /  Alb  3.5  /  TBili  1.0  /  DBili  x   /  AST  53<H>  /  ALT  48  /  AlkPhos  63  06-22    PT/INR - ( 22 Jun 2021 08:17 )   PT: 16.4 sec;   INR: 1.44 ratio         PTT - ( 21 Jun 2021 19:22 )  PTT:35.4 sec      Blood Culture:   I&O's Summary    21 Jun 2021 07:01  -  22 Jun 2021 07:00  --------------------------------------------------------  IN: 1000 mL / OUT: 0 mL / NET: 1000 mL      CAPILLARY BLOOD GLUCOSE          RADIOLOGY/EKG:    < from: Xray Chest 1 View-PORTABLE IMMEDIATE (06.21.21 @ 19:12) >  IMPRESSION:   No evidence of active chest disease.              DUY WILKERSON MD; Attending Radiologist  This document has been electronically signed. Jun 22 2021  8:51AM    < end of copied text >  < from: CT Chest No Cont (06.22.21 @ 08:27) >  IMPRESSION:  *  No acute chest pathology.            SUSANNA ARANGO MD; Attending Radiologist  This document has been electronically signed. Jun 22 2021  9:26AM    < end of copied text >

## 2021-06-22 NOTE — CONSULT NOTE ADULT - ASSESSMENT
Fever- Mechanical aortic valve in place.  Spoke with FM resident.  2 sets blood cx draw before abx and pending results.  check 2 D echo.  ID team input pending.  EKG did not reveal any AV block    INR subtherapeutic, goal 2.5-3.0  on heparin drip.    d/w pt at length.    HTN- Bp optimal.  continue current meds.    Other medical issues- Management per primary team.   Thank you for allowing me to participate in the care of this patient. Please feel free to contact me with any questions.

## 2021-06-22 NOTE — CONSULT NOTE ADULT - ASSESSMENT
Antibiotics as per ID.  Evaluation as per ID.  Follow up on blood cultures.   Ehrichea PCR, babesia PCR, lyme serology with reflex WB.  EBV serologies.  ID following.    Echocardiogram.  Cardiology following.     On therapeutic heparin, given subtherapeutic INR.

## 2021-06-22 NOTE — CONSULT NOTE ADULT - SUBJECTIVE AND OBJECTIVE BOX
INPATIENT CONSULTATION   REQUESTING PHYSICIAN: Hospitalist  REASON FOR CONSULT: Pancytopenia  HISTORY OF PRESENT ILLNESS:   [This is a 70-year-old gentleman with a past medical history significant for coronary artery disease status post a failed to have her in 2016 status post aortic valve replacement mechanical back in 2017 treated at [facility] as well as a history of hypertension and hyperlipidemia who was admitted for progressive worsening fevers that it started on .  The patient has had a long-standing history of cytopenias as noted by the patient.  He notes that back in 2017 when initially hospitalized at [place] he had been seen by Dr. Doe for similar reasons whereby at that time was deemed to be reactive and likely due to underlying myelosuppression.  The patient has never undergone a bone marrow biopsy.  He also notes that he has been following up with his primary care physician for several years with intermittent waxing and waning of cytopenias.    PAST MEDICAL HISTORY:    1.  CAD, s/p failed TAVR , s/p Aortic valve replacement (mechanical) 2017, HTN, HLD   MEDICATIONS:     MEDICATIONS  (STANDING):  amLODIPine   Tablet 5 milliGRAM(s) Oral daily  aspirin enteric coated 81 milliGRAM(s) Oral daily  atorvastatin 80 milliGRAM(s) Oral at bedtime  cefepime  Injectable.      cefepime  Injectable. 1000 milliGRAM(s) IV Push every 8 hours  folic acid 1 milliGRAM(s) Oral daily  heparin  Infusion. 1300 Unit(s)/Hr (13 mL/Hr) IV Continuous <Continuous>  losartan 100 milliGRAM(s) Oral daily  metoprolol tartrate 100 milliGRAM(s) Oral every 12 hours  multivitamin 1 Tablet(s) Oral daily  sodium chloride 0.9%. 1000 milliLiter(s) (75 mL/Hr) IV Continuous <Continuous>  thiamine 100 milliGRAM(s) Oral daily    MEDICATIONS  (PRN):  acetaminophen   Tablet .. 650 milliGRAM(s) Oral every 4 hours PRN Temp greater or equal to 38C (100.4F)  heparin   Injectable 7000 Unit(s) IV Push every 6 hours PRN For aPTT less than 40  heparin   Injectable 3500 Unit(s) IV Push every 6 hours PRN For aPTT between 40 - 57    ALLERGIES: No known drug allergies  SOCIAL HISTORY:   Patient consumes alcohol no illicit drug use patient consumes alcohol   FAMILY HISTORY: No family history of blood disorders  REVIEW OF SYSTEMS: Review of systems is significant for fevers no chest pain palpitations all remaining systems were reviewed and noted to be negative  PHYSICAL EXAMINATION: Is 70 gentleman in no acute distress resting comfortably in the black bed no unusual rashes or skin changes lungs were auscultated no appreciable wheezing rhonchi or rales.  Patient has an audible click from the patient's mechanical heart valve.    ADDITIONAL DATA:  .  LABS:                         12.5   2.24  )-----------( 69       ( 2021 03:43 )             35.6     06-22    130<L>  |  98  |  13  ----------------------------<  127<H>  3.8   |  25  |  1.19    Ca    8.4<L>      2021 08:17  Phos  2.3     -  Mg     1.7     -    TPro  7.1  /  Alb  3.5  /  TBili  1.0  /  DBili  x   /  AST  53<H>  /  ALT  48  /  AlkPhos  63  -22    PT/INR - ( 2021 08:17 )   PT: 16.4 sec;   INR: 1.44 ratio         PTT - ( 2021 03:43 )  PTT:80.9 sec  Urinalysis Basic - ( 2021 19:22 )    Color: Yellow / Appearance: Slightly Turbid / S.010 / pH: x  Gluc: x / Ketone: Small  / Bili: Negative / Urobili: Negative mg/dL   Blood: x / Protein: 30 mg/dL / Nitrite: Negative   Leuk Esterase: Negative / RBC: >50 /HPF / WBC Negative   Sq Epi: x / Non Sq Epi: Occasional / Bacteria: Occasional            RADIOLOGY, EKG & ADDITIONAL TESTS: Reviewed.   ASSESSMENT: Overall this is a 70-year-old gentleman here for evaluation of pancytopenia and history of recent fevers.    PLAN:    1.  After discussion with the patient the patient cytopenias have been somewhat chronic dating back to 2017.  The patient has not had any additional follow-up with hematology in the past.  Given the patient's persistence of cytopenias I did discuss the possibility of proceeding with a bone marrow biopsy yet at this time the patient is quite apprehensive and would like to confer with his primary care physician.  At this time the patient is currently actively on antibiotics and will continue with ongoing support to evaluate for possible infectious etiology.  I did explain to the patient that these findings could also be sequelae of myelosuppression due to ongoing active infection.  We will continue to monitor serial CBCs to evaluate for possible resolution.  I will confer the patient's primary care physician to assess whether or not the chronicity of his cytopenias is relevant under the circumstances.  Would continue with ongoing supportive care.  Peripheral smear to be reviewed

## 2021-06-23 LAB
A PHAGOCYTOPH DNA BLD QL NAA+PROBE: NEGATIVE — SIGNIFICANT CHANGE UP
APTT BLD: 80.9 SEC — HIGH (ref 27.5–35.5)
CMV IGG FLD QL: 5.7 U/ML — HIGH
CMV IGG SERPL-IMP: POSITIVE
CMV IGM FLD-ACNC: <8 AU/ML — SIGNIFICANT CHANGE UP
CMV IGM SERPL QL: NEGATIVE — SIGNIFICANT CHANGE UP
CULTURE RESULTS: SIGNIFICANT CHANGE UP
E CHAFFEENSIS DNA BLD QL NAA+PROBE: NEGATIVE — SIGNIFICANT CHANGE UP
E EWINGII DNA SPEC QL NAA+PROBE: NEGATIVE — SIGNIFICANT CHANGE UP
EHRLICHIA DNA SPEC QL NAA+PROBE: NEGATIVE — SIGNIFICANT CHANGE UP
HCT VFR BLD CALC: 35.6 % — LOW (ref 39–50)
HGB BLD-MCNC: 12.5 G/DL — LOW (ref 13–17)
MCHC RBC-ENTMCNC: 33.2 PG — SIGNIFICANT CHANGE UP (ref 27–34)
MCHC RBC-ENTMCNC: 35.1 GM/DL — SIGNIFICANT CHANGE UP (ref 32–36)
MCV RBC AUTO: 94.4 FL — SIGNIFICANT CHANGE UP (ref 80–100)
PLATELET # BLD AUTO: 69 K/UL — LOW (ref 150–400)
RBC # BLD: 3.77 M/UL — LOW (ref 4.2–5.8)
RBC # FLD: 13.2 % — SIGNIFICANT CHANGE UP (ref 10.3–14.5)
SPECIMEN SOURCE: SIGNIFICANT CHANGE UP
WBC # BLD: 2.24 K/UL — LOW (ref 3.8–10.5)
WBC # FLD AUTO: 2.24 K/UL — LOW (ref 3.8–10.5)

## 2021-06-23 PROCEDURE — 99233 SBSQ HOSP IP/OBS HIGH 50: CPT | Mod: GC

## 2021-06-23 RX ORDER — ZOLPIDEM TARTRATE 10 MG/1
5 TABLET ORAL AT BEDTIME
Refills: 0 | Status: DISCONTINUED | OUTPATIENT
Start: 2021-06-23 | End: 2021-06-25

## 2021-06-23 RX ORDER — SODIUM,POTASSIUM PHOSPHATES 278-250MG
1 POWDER IN PACKET (EA) ORAL ONCE
Refills: 0 | Status: COMPLETED | OUTPATIENT
Start: 2021-06-23 | End: 2021-06-23

## 2021-06-23 RX ADMIN — HEPARIN SODIUM 1300 UNIT(S)/HR: 5000 INJECTION INTRAVENOUS; SUBCUTANEOUS at 05:19

## 2021-06-23 RX ADMIN — Medication 1 TABLET(S): at 08:35

## 2021-06-23 RX ADMIN — AMLODIPINE BESYLATE 5 MILLIGRAM(S): 2.5 TABLET ORAL at 08:35

## 2021-06-23 RX ADMIN — ZOLPIDEM TARTRATE 5 MILLIGRAM(S): 10 TABLET ORAL at 21:36

## 2021-06-23 RX ADMIN — Medication 81 MILLIGRAM(S): at 08:35

## 2021-06-23 RX ADMIN — HEPARIN SODIUM 1300 UNIT(S)/HR: 5000 INJECTION INTRAVENOUS; SUBCUTANEOUS at 11:14

## 2021-06-23 RX ADMIN — Medication 650 MILLIGRAM(S): at 02:53

## 2021-06-23 RX ADMIN — Medication 100 MILLIGRAM(S): at 21:36

## 2021-06-23 RX ADMIN — Medication 1 PACKET(S): at 17:20

## 2021-06-23 RX ADMIN — Medication 1 MILLIGRAM(S): at 08:35

## 2021-06-23 RX ADMIN — SODIUM CHLORIDE 75 MILLILITER(S): 9 INJECTION INTRAMUSCULAR; INTRAVENOUS; SUBCUTANEOUS at 08:35

## 2021-06-23 RX ADMIN — Medication 100 MILLIGRAM(S): at 08:35

## 2021-06-23 RX ADMIN — LOSARTAN POTASSIUM 100 MILLIGRAM(S): 100 TABLET, FILM COATED ORAL at 08:35

## 2021-06-23 RX ADMIN — CEFEPIME 1000 MILLIGRAM(S): 1 INJECTION, POWDER, FOR SOLUTION INTRAMUSCULAR; INTRAVENOUS at 05:19

## 2021-06-23 RX ADMIN — SODIUM CHLORIDE 75 MILLILITER(S): 9 INJECTION INTRAMUSCULAR; INTRAVENOUS; SUBCUTANEOUS at 17:24

## 2021-06-23 NOTE — CONSULT NOTE ADULT - CONSULT REASON
Fever, AVR
fever
pancytopenia
71yo M with long term ,recurrent cytopenias referred to IR for bone marrow biopsy
fever

## 2021-06-23 NOTE — PROGRESS NOTE ADULT - SUBJECTIVE AND OBJECTIVE BOX
Patient is a 70y old  Male who presents with a chief complaint of Fever.       HPI:  71 yo Male with PMHx of CAD, s/p failed TAVR 2016, s/p Aortic valve replacement (mechanical) 2017, HTN, HLD was sent to the ED by Dr. Herrera.  Patient mentions he started having chills, malaise and fevers on , max temp that he had was 102.  He went to Dr. Herrera's office and was told to come to the ED.  Patient has been taking Tylenol 325 x2 every time he has fever. Patient denies any chest pain, abdominal pain, nausea, vomiting, diarrhea, constipation, urinary frequency or urgency or cough.  Patient drinks 4-6 beers on a daily basis.        Examined at bedsite,  no CP or SOB.   Still febrile at night slightly lower fever  TTE with no indications of vegetations      PAST MEDICAL & SURGICAL HISTORY:  Frequency of urination    ED (erectile dysfunction)    BPH (benign prostatic hyperplasia)    Ureteral stricture    H/O urethral stricture    HTN (hypertension)    H/O hyperlipidemia    PAT (paroxysmal atrial tachycardia)    Asthma    CAD (coronary artery disease)    H/O coronary artery bypass surgery    Aortic valve replaced  [facility]    H/O inguinal hernia repair    H/O cystoscopy  x2        MEDICATIONS  (STANDING):  amLODIPine   Tablet 5 milliGRAM(s) Oral daily  aspirin enteric coated 81 milliGRAM(s) Oral daily  atorvastatin 80 milliGRAM(s) Oral at bedtime  cefepime  Injectable.      cefepime  Injectable. 1000 milliGRAM(s) IV Push every 8 hours  heparin   Injectable 7000 Unit(s) IV Push once  heparin  Infusion.  Unit(s)/Hr (16 mL/Hr) IV Continuous <Continuous>  losartan 100 milliGRAM(s) Oral daily  metoprolol tartrate 100 milliGRAM(s) Oral every 12 hours  multivitamin 1 Tablet(s) Oral daily  sodium chloride 0.9%. 1000 milliLiter(s) (75 mL/Hr) IV Continuous <Continuous>  vancomycin  IVPB 1000 milliGRAM(s) IV Intermittent every 12 hours  vancomycin  IVPB        MEDICATIONS  (PRN):  acetaminophen   Tablet .. 650 milliGRAM(s) Oral every 4 hours PRN Temp greater or equal to 38C (100.4F)  heparin   Injectable 7000 Unit(s) IV Push every 6 hours PRN For aPTT less than 40  heparin   Injectable 3500 Unit(s) IV Push every 6 hours PRN For aPTT between 40 - 57      FAMILY HISTORY: No family history of premature CAD or SCD.       SOCIAL HISTORY: no recent smoking     REVIEW OF SYSTEMS:  CONSTITUTIONAL:    No fatigue, malaise, lethargy.  c/o fever or chills.   RESPIRATORY:  No cough.  No wheeze.  No hemoptysis.  No shortness of breath.  CARDIOVASCULAR:  No chest pains.  No palpitations. No shortness of breath, No orthopnea or PND.  GASTROINTESTINAL:  No abdominal pain.  No nausea or vomiting.    GENITOURINARY:    No hematuria.    MUSCULOSKELETAL:  No musculoskeletal pain.  No joint swelling.  No arthritis.  NEUROLOGICAL:  No tingling or numbness or weakness.  PSYCHIATRIC:  No confusion  SKIN:  No rashes.          Vital Signs Last 24 Hrs  T(C): 37.4 (2021 07:50), Max: 39.2 (2021 22:14)  T(F): 99.3 (2021 07:50), Max: 102.5 (2021 22:14)  HR: 81 (2021 10:21) (72 - 95)  BP: 116/78 (2021 10:21) (116/78 - 154/75)  BP(mean): 101 (2021 18:32) (101 - 101)  RR: 17 (2021 07:50) (17 - 18)  SpO2: 95% (2021 07:50) (95% - 100%)    PHYSICAL EXAM-    Constitutional: The patient appears to be normal, well developed, well nourished and alert and oriented to time, place and person. The patient does not appear acutely ill.     Head: Head is normocephalic and atraumatic.      Neck: No jugular venous distention. No audible carotid bruits. There are strong carotid pulses bilaterally. No JVD.     Cardiovascular: Regular rate and rhythm without S3, S4. No murmurs or rubs are appreciated.   Mechanical heart sounds +     Respiratory: Breathsounds are normal. No rales. No wheezing.    Abdomen: Soft, nontender, nondistended with positive bowel sounds.      Extremity: No tenderness. No  pitting edema         INTERPRETATION OF TELEMETRY: no ton     ECG: Sinus rythm, no ST T changes.     I&O's Detail    2021 07: 07:00  --------------------------------------------------------  IN:    Oral Fluid: 1000 mL  Total IN: 1000 mL    OUT:  Total OUT: 0 mL    Total NET: 1000 mL          LABS:                        13.2   1.93  )-----------( 69       ( 2021 08:17 )             37.5     -22    130<L>  |  98  |  13  ----------------------------<  127<H>  3.8   |  25  |  1.19    Ca    8.4<L>      2021 08:17  Phos  2.3     22  Mg     1.7         TPro  7.1  /  Alb  3.5  /  TBili  1.0  /  DBili  x   /  AST  53<H>  /  ALT  48  /  AlkPhos  63  22        PT/INR - ( 2021 08:17 )   PT: 16.4 sec;   INR: 1.44 ratio         PTT - ( 2021 19:22 )  PTT:35.4 sec  Urinalysis Basic - ( 2021 19:22 )    Color: Yellow / Appearance: Slightly Turbid / S.010 / pH: x  Gluc: x / Ketone: Small  / Bili: Negative / Urobili: Negative mg/dL   Blood: x / Protein: 30 mg/dL / Nitrite: Negative   Leuk Esterase: Negative / RBC: >50 /HPF / WBC Negative   Sq Epi: x / Non Sq Epi: Occasional / Bacteria: Occasional      I&O's Summary    2021 07:01  -  2021 07:00  --------------------------------------------------------  IN: 1000 mL / OUT: 0 mL / NET: 1000 mL      BNP  RADIOLOGY & ADDITIONAL STUDIES:

## 2021-06-23 NOTE — PROGRESS NOTE ADULT - ASSESSMENT
71 yo Male with PMHx of CAD, s/p failed TAVR 2016, s/p Aortic valve replacement (mechanical) 2017, HTN, HLD admitted on 6/21 for evaluation of fever, chills that started on 6/20. has no other specific complaints, no travel, no recent dental work, labs upon admission show leukopenia and thrombocytopenia. No known tick or bug bites, however, mows his lawn and in the garden.   1. Patient admitted with acute febrile illness, and lab abnormalities, low wbc and low platelets; however review of symptoms and physical essentially benign  - follow up cultures   - iv hydration and supportive care   - serial cbc and monitor temperature   - reviewed prior medical records to evaluate for resistant or atypical pathogens   - will check for tick borne illnesses, Ehrlichea pending  - negative Babesia, Lyme, EBV, echocardiogram  - will check Parvo virus and CMV serology  - stop cefepime  - needs bone marrow biopsy, has agreed    2. other issues; per medicine

## 2021-06-23 NOTE — CONSULT NOTE ADULT - SUBJECTIVE AND OBJECTIVE BOX
Chief Complaint:  Patient is a 70y old  Male who presents with a chief complaint of recurrent cytopenias    HPI:  69 yo Male with PMHx of CAD, s/p failed TAVR 2016, s/p Aortic valve replacement (mechanical) 2017, HTN, HLD was sent to the ED by Dr. Herrera.  Patient mentions he started having chills, malaise and fevers on Sunday 06/20, max temp that he had was 102.  He went to Dr. Herrera's office and was told to come to the ED. Pt's labwork significant for leukopenia, thrombocytopenia. IR was consulted for bone marrow biopsy by Dr. Perez.     Allergies  No Known Allergies    MEDICATIONS  (STANDING):  amLODIPine   Tablet 5 milliGRAM(s) Oral daily  aspirin enteric coated 81 milliGRAM(s) Oral daily  atorvastatin 80 milliGRAM(s) Oral at bedtime  folic acid 1 milliGRAM(s) Oral daily  heparin  Infusion. 1300 Unit(s)/Hr (13 mL/Hr) IV Continuous <Continuous>  losartan 100 milliGRAM(s) Oral daily  metoprolol tartrate 100 milliGRAM(s) Oral every 12 hours  multivitamin 1 Tablet(s) Oral daily  sodium chloride 0.9%. 1000 milliLiter(s) (75 mL/Hr) IV Continuous <Continuous>  thiamine 100 milliGRAM(s) Oral daily    MEDICATIONS  (PRN):  acetaminophen   Tablet .. 650 milliGRAM(s) Oral every 4 hours PRN Temp greater or equal to 38C (100.4F)  heparin   Injectable 7000 Unit(s) IV Push every 6 hours PRN For aPTT less than 40  heparin   Injectable 3500 Unit(s) IV Push every 6 hours PRN For aPTT between 40 - 57      PAST MEDICAL & SURGICAL HISTORY:  Frequency of urination    ED (erectile dysfunction)    BPH (benign prostatic hyperplasia)    Ureteral stricture    H/O urethral stricture    HTN (hypertension)    H/O hyperlipidemia    PAT (paroxysmal atrial tachycardia)    Asthma    CAD (coronary artery disease)    H/O coronary artery bypass surgery    Aortic valve replaced  [facility]    H/O inguinal hernia repair    H/O cystoscopy  x2      Vital Signs Last 24 Hrs  T(C): 37.1 (23 Jun 2021 08:18), Max: 37.6 (22 Jun 2021 23:30)  T(F): 98.7 (23 Jun 2021 08:18), Max: 99.7 (22 Jun 2021 23:30)  HR: 62 (23 Jun 2021 08:18) (62 - 70)  BP: 128/62 (23 Jun 2021 08:18) (107/62 - 128/62)  BP(mean): --  RR: 18 (23 Jun 2021 08:18) (18 - 18)  SpO2: 98% (23 Jun 2021 08:18) (97% - 98%)      CBC                        12.2   1.58  )-----------( 67       ( 23 Jun 2021 10:32 )             33.9       Chemistry  06-22    130<L>  |  98  |  13  ----------------------------<  127<H>  3.8   |  25  |  1.19    Ca    8.4<L>      22 Jun 2021 08:17  Phos  1.7     06-23  Mg     1.8     06-23    TPro  7.1  /  Alb  3.5  /  TBili  1.0  /  DBili  x   /  AST  53<H>  /  ALT  48  /  AlkPhos  63  06-22    Coags  PT/INR - ( 23 Jun 2021 10:32 )   PT: 18.3 sec;   INR: 1.60 ratio    PTT - ( 23 Jun 2021 10:32 )  PTT:91.8 sec

## 2021-06-23 NOTE — PROGRESS NOTE ADULT - ASSESSMENT
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 Herrera's office for chills, malase and fever since 6/20/21. TMax of 102.      #SIRS Criteria  - Resolved, Examined at 7 AM this morning, No longer meets SIRS  - Unknown source of infection  - Infectious Disease consult appreciated: Continue Cefepime, S/P 1 dose of Vancomycin  - Tick bourne Illness, Babesia, Erlichiea, Lyme Western Blot, Viral EBV, Echocardiogram  - Follow up Blood, Urine Cultures, Rapid Viral Panel  - Cardiology Consult Appreciated Echo for Endocarditis rule out. No Janeway Lesions, Osler Nodes. Discussed need for Echo with Cardiologist.    #Pancytopenia  - Possibly due to JODI Consumption  - Follow up Folate levels  - Phosphate decreased, replenished, continue to follow  - Follow up Heme/Onc Consult   - Follow up CBC @ 19:00, AM.     #CAD  - Failed TAVR, Aortic mechanical valve replacement 2017  - Coumadin 8 QHS, INR 1.4 this AM. 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 Herrera's office for chills, malase and fever since 6/20/21. TMax of 102.      #SIRS Criteria  - Resolved, Examined at 7 AM this morning, No longer meets SIRS  - Febrile to 102 overnight  - Unknown source of infection  - Infectious Disease consult appreciated: Stop antibiotics   - Tick bourne Illness, Babesia, Erlichiea, Lyme Western Blot, Viral EBV, Echocardiogram negative so far.   - Follow up Blood, Urine Cultures, Rapid Viral Panel negative so far.   - Cardiology Consult Appreciated Echo for Endocarditis rule out. No Janeway Lesions, Osler Nodes. TTE negative for lesions    #Pancytopenia  - Possibly due to JODI Consumption  - Follow up Folate levels  - Phosphate decreased, replenished, continue to follow  - Follow up Heme/Onc Consult, Bone Marrow Biopsy in AM with IR  - Follow up CBC in AM    #CAD  - Failed TAVR, Aortic mechanical valve replacement 2017  - HOLD Coumadin tonight  - HOLD Heparin 4 hours prior to Bone Marrow Biopsy.     #HTN/HLD  - Continue Home Losartan, Amlodipine, Metoprolol,  - Continue Atorvastatin 40    #Diet  Diet, DASH/TLC  NPO after Midnight for Bone Marrow Biopsy    #DVT PPX  - Heparin Drip - until 4 hours prior to surgery  - Hold Heparin tonight   - SCDs  - PT/INR in AM    #Code Status   - Full Code    #Dispo  - Pending Cultures, Clinical Improvement, Bone Marrow Biopsy

## 2021-06-23 NOTE — PROGRESS NOTE ADULT - ASSESSMENT
Fever- Mechanical aortic valve in place.  2 sets blood cx, pending results.  2 D echo with no evidence of endocarditis  possibly viral origin  Will follow  If indicated will consider BENJAMIN for better evaluation of the prosthetic valve  Continue anticoagulation

## 2021-06-23 NOTE — CONSULT NOTE ADULT - ASSESSMENT
69yo M with long term ,recurrent cytopenias referred to IR for bone marrow biopsy  - On heparin drip for mechanical valve  - Last ASA today  - Not NPO  - Consentable

## 2021-06-23 NOTE — PROGRESS NOTE ADULT - SUBJECTIVE AND OBJECTIVE BOX
Date of service: 06-23-21 @ 13:41    Patient had fever last night to 102, followed by sweats; has noted cytopenias in past after discussing with heme consult  No evidence of bacterial infection, tick infection so far        ROS: denies dizziness, no HA, no SOB or cough, no abdominal pain, no diarrhea or constipation; no dysuria, no urinary frequency, no legs pain, no rashes    MEDICATIONS  (STANDING):  amLODIPine   Tablet 5 milliGRAM(s) Oral daily  aspirin enteric coated 81 milliGRAM(s) Oral daily  atorvastatin 80 milliGRAM(s) Oral at bedtime  folic acid 1 milliGRAM(s) Oral daily  heparin  Infusion. 1300 Unit(s)/Hr (13 mL/Hr) IV Continuous <Continuous>  losartan 100 milliGRAM(s) Oral daily  metoprolol tartrate 100 milliGRAM(s) Oral every 12 hours  multivitamin 1 Tablet(s) Oral daily  sodium chloride 0.9%. 1000 milliLiter(s) (75 mL/Hr) IV Continuous <Continuous>  thiamine 100 milliGRAM(s) Oral daily    MEDICATIONS  (PRN):  acetaminophen   Tablet .. 650 milliGRAM(s) Oral every 4 hours PRN Temp greater or equal to 38C (100.4F)  heparin   Injectable 7000 Unit(s) IV Push every 6 hours PRN For aPTT less than 40  heparin   Injectable 3500 Unit(s) IV Push every 6 hours PRN For aPTT between 40 - 57      Vital Signs Last 24 Hrs  T(C): 37.1 (23 Jun 2021 08:18), Max: 38.1 (22 Jun 2021 15:02)  T(F): 98.7 (23 Jun 2021 08:18), Max: 100.6 (22 Jun 2021 15:02)  HR: 62 (23 Jun 2021 08:18) (62 - 70)  BP: 128/62 (23 Jun 2021 08:18) (107/62 - 128/62)  BP(mean): --  RR: 18 (23 Jun 2021 08:18) (18 - 18)  SpO2: 98% (23 Jun 2021 08:18) (97% - 98%)        Physical Exam:          Constitutional: frail looking  HEENT: NC/AT, EOMI, PERRLA, conjunctivae clear; ears and nose atraumatic; pharynx clear  Neck: supple; thyroid not palpable  Back: no tenderness  Respiratory: respiratory effort normal; clear to auscultation  Cardiovascular: S1S2 regular, no murmurs  Abdomen: soft, not tender, not distended, positive BS; no liver or spleen organomegaly  Genitourinary: no suprapubic tenderness  Musculoskeletal: no muscle tenderness, no joint swelling or tenderness  Neurological/ Psychiatric: AxOx3, judgement and insight normal;  moving all extremities  Skin: no rashes; no palpable lesions    Labs: all available labs reviewed                        Labs:                        12.2   1.58  )-----------( 67       ( 23 Jun 2021 10:32 )             33.9     06-22    130<L>  |  98  |  13  ----------------------------<  127<H>  3.8   |  25  |  1.19    Ca    8.4<L>      22 Jun 2021 08:17  Phos  1.7     06-23  Mg     1.8     06-23    TPro  7.1  /  Alb  3.5  /  TBili  1.0  /  DBili  x   /  AST  53<H>  /  ALT  48  /  AlkPhos  63  06-22           Cultures:       Babesia microti PCR, Blood. (collected 06-22-21 @ 12:07)  Source: .Blood None  Final Report (06-22-21 @ 19:32):    Babesia microti PCR    Results: NOT detected    ***************Result Note*************    The detection of Babesia microti by PCR has only been    validated for whole blood; this test has not been approved    by the [place] Food and Drug Administration (FDA). Performance    characteristics of this assay have been determined by    [facility]. The clinical significance    of results should be considered in conjunction with the    overall clinical presentation of the patient. Result is not    intended to be used as the sole means for clinical diagnosis    or patient management decisions.    One negative sample does not necessarily rule    out the presence of a parasitic infection.    Culture - Blood (collected 06-21-21 @ 19:22)  Source: .Blood Blood  Preliminary Report (06-23-21 @ 06:01):    No growth to date.    Culture - Blood (collected 06-21-21 @ 19:22)  Source: .Blood Blood-Peripheral  Preliminary Report (06-23-21 @ 06:01):    No growth to date.      C-Reactive Protein, Serum: 82 mg/L (06-21-21 @ 19:22)    Borrelia burgdorferi IgG/IgM Antibodies (06.22.21 @ 12:07)    LYME IgG/IgM Antibodies Result: 0.07 Index    Lyme C6 Interpretation: Negative: METHOD: Liasion Chemiluminescent Immunoassay      Reference Range: (values expressed as Lyme Index )                                < 0.90        Negative                                0.90 - 1.09   Equivocal                                >= 1.10     Positive  CDC/ASTPHLD Guidelines recommend that all samples judged equivocal or  positive be re-tested by confirmatory immunoassays.          Nkechi-Barr Virus Serologic Test (06.22.21 @ 12:07)    Nkechi-Barr Virus Capsid Antigen IgG: Positive: Nkechi-Barr Virus VCA IgG Antibody  Method: Liaison Chemiluminescent Immunoassay  Reference Range: (Expressed in U/mL)       Negative        < 18.0 U/mL       Equivocal      18.0 - 21.9 U/mL       Positive         >= 22.0 U/mL    Nkechi-Barr Nuclear Antigen: Positive: Nkechi-Barr Virus NA IgG Antibody  Method: Liaison Chemiluminescent Immunoassay  Reference Range: (Expressed in U/mL)       Negative        < 18.0 U/mL       Equivocal      18.0 - 21.9 U/mL       Positive         >= 22.0 U/mL    Nkechi Barr Virus IgM Antibody: Negative: Nkechi-Barr Virus VCA IgM Antibody  Method: Liaison Chemiluminescent Immunoassay  Reference Range: (Expressed in U/mL)       Negative     < 36.0 U/mL       Equivocal    36.0 - 43.9 U/mL       Positive       >= 44.0 U/mL    Nkechi-Barr Virus Early Antigen: Positive: Nkechi-Barr Virus EA IgG Antibody  Method: Liaison Chemiluminescent Immunoassay  Reference Range: (Expressed in U/mL)       Negative        < 9.0 U/mL       Equivocal       9.0 - 10.9 U/mL       Positive          >= 11.0 U/mL    EBV Interpretation: See Note: INTERPRETATION OF NKECHI BARR VIRUS (EBV) ANTIBODY RESULTS  Sero-Negative   EBV VCA IGG AB - -  NEG   EBV NA IGG AB    - -  NEG   EBV VCA IGM AB - - NEG   EBV EA IGG AB    - -  NEG  Suspected Primary Infection (Early Phase)   EBV VCA IGG AB - -  NEG   EBV NA IGG AB    - -  NEG   EBV VCA IGM AB - - POS   EBV EA IGG AB     - - NEG  Past EBV Infection ( Convalescence)   EBV VCA IGG AB  - - POS   EBV NA IGG AB     - - NEG   EBV VCA IGM AB - - POS   EBV EA IGG AB     - - POS/NEG  Past EBV Infection  EBV VCA IGG AB - - POS   EBV NA IGG AB  - -  POS   EBV VCA IGM AB - -NEG   EBV EA IGG AB   - - POS/NEG  Reactivated Infection   EBV VCA IGG AB - -  POS   EBV NA IGG AB    - -  POS   EBV VCA IGM AB - - POS/NEG   EBV EA IGG AB     - - POS    EBV VCA IgG EIA: >750.0 U/mL    EBNA IgG EIA: >600.0 U/mL    EBV VCA IgM EIA: <10.0 U/mL    EBV EA Ab EIA: 43.6 U/mL              < from: TTE Echo Complete w/o Contrast w/ Doppler (06.22.21 @ 13:40) >   Impression     Summary     The left ventricle is normal in size, wall motion and contractility.   Mild concentric left ventricular hypertrophy is present.   Estimated left ventricular ejection fraction is 60 %.   Well seated prosthetic [employer] Gera valve in the aortic position. Peak   trans-prosthetic gradient is within normal limitations for this type of   prosthesis.   Trace aortic regurgitation is present.   Fibrocalcific changes noted to the mitral valve leaflets with preserved   leaflet excursion.   Mild (1+) mitral regurgitation is present.   Moderate Tricuspid regurgitation is present.   Mild pulmonary hypertension.     No obvious vegetations are seen in this study. Consider BENJAMIN if clinically   appropriate.    < end of copied text >                 < from: CT Chest No Cont (06.22.21 @ 08:27) >    IMPRESSION:  *  No acute chest pathology.      < end of copied text >        Radiology: all available radiological tests reviewed    Advanced directives addressed: full resuscitation

## 2021-06-23 NOTE — PROGRESS NOTE ADULT - SUBJECTIVE AND OBJECTIVE BOX
69 YO Male With a PMH of Frequency of urination, ED, BPH, Ureteral stricture, HTN, hyperlipidemia, PAT, Asthma, CAD, coronary artery bypass surgery, Aortic valve replacement, inguinal hernia repair, cystoscopy who presents with a chief complaint of Fever     Subjective: Patient was seen and examined by me this morning at bedside. He has discussed the need for bone marrow biopsy with     REVIEW OF SYSTEMS:  CONSTITUTIONAL: No weakness, fevers or chills currently.   EYES/ENT: No visual changes;  No vertigo or throat pain   NECK: No pain or stiffness  RESPIRATORY: No cough, wheezing, hemoptysis; No shortness of breath  CARDIOVASCULAR: No chest pain or palpitations  GASTROINTESTINAL: No abdominal or epigastric pain. No nausea, vomiting; No diarrhea or constipation.   GENITOURINARY: No dysuria, frequency or hematuria  NEUROLOGICAL: No numbness or weakness  SKIN: No itching, burning, rashes, or lesions.     PHYSICAL EXAM:  Vital Signs Last 24 Hrs  T(C): 37.4 (22 Jun 2021 07:50), Max: 39.2 (21 Jun 2021 22:14)  T(F): 99.3 (22 Jun 2021 07:50), Max: 102.5 (21 Jun 2021 22:14)  HR: 81 (22 Jun 2021 10:21) (72 - 95)  BP: 116/78 (22 Jun 2021 10:21) (116/78 - 154/75)  BP(mean): 101 (21 Jun 2021 18:32) (101 - 101)  RR: 17 (22 Jun 2021 07:50) (17 - 18)  SpO2: 95% (22 Jun 2021 07:50) (95% - 100%)    Constitutional: Pt lying in bed, awake and alert, NAD  HEENT: EOMI, normal hearing, moist mucous membranes  Neck: Soft and supple, no JVD  Respiratory: CTABL, No wheezing, rales or rhonchi  Cardiovascular: S1S2+, RRR, no M/G/R  Gastrointestinal: BS+, soft, NT/ND, no guarding, no rebound  Extremities: No peripheral edema  Vascular: 2+ peripheral pulses  Neurological: AAOx3, no focal deficits  Musculoskeletal: 5/5 strength b/l upper and lower extremities  Skin: No rashes, No osler nodes, janeway lesions.     MEDICATIONS:  MEDICATIONS  (STANDING):  amLODIPine   Tablet 5 milliGRAM(s) Oral daily  aspirin enteric coated 81 milliGRAM(s) Oral daily  atorvastatin 80 milliGRAM(s) Oral at bedtime  cefepime  Injectable.      cefepime  Injectable. 1000 milliGRAM(s) IV Push every 8 hours  heparin  Infusion.  Unit(s)/Hr (16 mL/Hr) IV Continuous <Continuous>  losartan 100 milliGRAM(s) Oral daily  metoprolol tartrate 100 milliGRAM(s) Oral every 12 hours  multivitamin 1 Tablet(s) Oral daily  sodium chloride 0.9%. 1000 milliLiter(s) (75 mL/Hr) IV Continuous <Continuous>  warfarin 8 milliGRAM(s) Oral once    MEDICATIONS  (PRN):  acetaminophen   Tablet .. 650 milliGRAM(s) Oral every 4 hours PRN Temp greater or equal to 38C (100.4F)  heparin   Injectable 7000 Unit(s) IV Push every 6 hours PRN For aPTT less than 40  heparin   Injectable 3500 Unit(s) IV Push every 6 hours PRN For aPTT between 40 - 57      LABS: All Labs Reviewed:                        13.2   1.93  )-----------( 69       ( 22 Jun 2021 08:17 )             37.5     06-22    130<L>  |  98  |  13  ----------------------------<  127<H>  3.8   |  25  |  1.19    Ca    8.4<L>      22 Jun 2021 08:17  Phos  2.3     06-22  Mg     1.7     06-22    TPro  7.1  /  Alb  3.5  /  TBili  1.0  /  DBili  x   /  AST  53<H>  /  ALT  48  /  AlkPhos  63  06-22    PT/INR - ( 22 Jun 2021 08:17 )   PT: 16.4 sec;   INR: 1.44 ratio         PTT - ( 21 Jun 2021 19:22 )  PTT:35.4 sec      Blood Culture:   I&O's Summary    21 Jun 2021 07:01  -  22 Jun 2021 07:00  --------------------------------------------------------  IN: 1000 mL / OUT: 0 mL / NET: 1000 mL      CAPILLARY BLOOD GLUCOSE          RADIOLOGY/EKG:    < from: Xray Chest 1 View-PORTABLE IMMEDIATE (06.21.21 @ 19:12) >  IMPRESSION:   No evidence of active chest disease.              DUY WILKERSON MD; Attending Radiologist  This document has been electronically signed. Jun 22 2021  8:51AM    < end of copied text >  < from: CT Chest No Cont (06.22.21 @ 08:27) >  IMPRESSION:  *  No acute chest pathology.            SUSANNA ARANGO MD; Attending Radiologist  This document has been electronically signed. Jun 22 2021  9:26AM    < end of copied text >     69 YO Male With a PMH of Frequency of urination, ED, BPH, Ureteral stricture, HTN, hyperlipidemia, PAT, Asthma, CAD, coronary artery bypass surgery, Aortic valve replacement, inguinal hernia repair, cystoscopy who presents with a chief complaint of Fever     Subjective: Patient was seen and examined by me this morning at bedside. He has discussed the need for bone marrow biopsy with hematology, Infectious disease and his primary care physician, Dr. Herrera. Patient had a fever to 102 overnight.     REVIEW OF SYSTEMS:  CONSTITUTIONAL: No weakness, fevers or chills currently.   EYES/ENT: No visual changes;  No vertigo or throat pain   NECK: No pain or stiffness  RESPIRATORY: No cough, wheezing, hemoptysis; No shortness of breath  CARDIOVASCULAR: No chest pain or palpitations  GASTROINTESTINAL: No abdominal or epigastric pain. No nausea, vomiting; No diarrhea or constipation.   GENITOURINARY: No dysuria, frequency or hematuria  NEUROLOGICAL: No numbness or weakness  SKIN: No itching, burning, rashes, or lesions.     PHYSICAL EXAM:  Vital Signs Last 24 Hrs  T(C): 36.4 (23 Jun 2021 15:53), Max: 37.6 (22 Jun 2021 23:30)  T(F): 97.5 (23 Jun 2021 15:53), Max: 99.7 (22 Jun 2021 23:30)  HR: 63 (23 Jun 2021 15:53) (62 - 64)  BP: 120/83 (23 Jun 2021 15:53) (120/83 - 128/62)  BP(mean): --  RR: 18 (23 Jun 2021 15:53) (18 - 18)  SpO2: 99% (23 Jun 2021 15:53) (97% - 99%)    Constitutional: Pt lying in bed, awake and alert, NAD  HEENT: EOMI, normal hearing, moist mucous membranes  Neck: Soft and supple, no JVD  Respiratory: CTABL, No wheezing, rales or rhonchi  Cardiovascular: S1S2+, RRR, no M/G/R  Gastrointestinal: BS+, soft, NT/ND, no guarding, no rebound  Extremities: No peripheral edema  Vascular: 2+ peripheral pulses  Neurological: AAOx3, no focal deficits  Musculoskeletal: 5/5 strength b/l upper and lower extremities  Skin: No rashes, no janeway lesions, osler nodes,     MEDICATIONS  (STANDING):  amLODIPine   Tablet 5 milliGRAM(s) Oral daily  aspirin enteric coated 81 milliGRAM(s) Oral daily  atorvastatin 80 milliGRAM(s) Oral at bedtime  folic acid 1 milliGRAM(s) Oral daily  heparin  Infusion. 1300 Unit(s)/Hr (13 mL/Hr) IV Continuous <Continuous>  losartan 100 milliGRAM(s) Oral daily  metoprolol tartrate 100 milliGRAM(s) Oral every 12 hours  multivitamin 1 Tablet(s) Oral daily  sodium chloride 0.9%. 1000 milliLiter(s) (75 mL/Hr) IV Continuous <Continuous>  thiamine 100 milliGRAM(s) Oral daily    MEDICATIONS  (PRN):  acetaminophen   Tablet .. 650 milliGRAM(s) Oral every 4 hours PRN Temp greater or equal to 38C (100.4F)  heparin   Injectable 7000 Unit(s) IV Push every 6 hours PRN For aPTT less than 40  heparin   Injectable 3500 Unit(s) IV Push every 6 hours PRN For aPTT between 40 - 57      LABS: All Labs Reviewed:                        13.2   1.93  )-----------( 69       ( 22 Jun 2021 08:17 )             37.5     06-22    130<L>  |  98  |  13  ----------------------------<  127<H>  3.8   |  25  |  1.19    Ca    8.4<L>      22 Jun 2021 08:17  Phos  2.3     06-22  Mg     1.7     06-22    TPro  7.1  /  Alb  3.5  /  TBili  1.0  /  DBili  x   /  AST  53<H>  /  ALT  48  /  AlkPhos  63  06-22    PT/INR - ( 22 Jun 2021 08:17 )   PT: 16.4 sec;   INR: 1.44 ratio         PTT - ( 21 Jun 2021 19:22 )  PTT:35.4 sec      Blood Culture:   I&O's Summary    21 Jun 2021 07:01  -  22 Jun 2021 07:00  --------------------------------------------------------  IN: 1000 mL / OUT: 0 mL / NET: 1000 mL      CAPILLARY BLOOD GLUCOSE          RADIOLOGY/EKG:    < from: Xray Chest 1 View-PORTABLE IMMEDIATE (06.21.21 @ 19:12) >  IMPRESSION:   No evidence of active chest disease.              DUY WILKERSON MD; Attending Radiologist  This document has been electronically signed. Jun 22 2021  8:51AM    < end of copied text >  < from: CT Chest No Cont (06.22.21 @ 08:27) >  IMPRESSION:  *  No acute chest pathology.            SUSANNA ARANGO MD; Attending Radiologist  This document has been electronically signed. Jun 22 2021  9:26AM    < end of copied text >

## 2021-06-24 ENCOUNTER — RESULT REVIEW (OUTPATIENT)
Age: 71
End: 2021-06-24

## 2021-06-24 LAB
A PHAGOCYTOPH IGG TITR SER IF: SIGNIFICANT CHANGE UP TITER
A PHAGOCYTOPH IGG TITR SER IF: SIGNIFICANT CHANGE UP TITER
ALBUMIN SERPL ELPH-MCNC: 3.2 G/DL — LOW (ref 3.3–5)
ALP SERPL-CCNC: 65 U/L — SIGNIFICANT CHANGE UP (ref 40–120)
ALT FLD-CCNC: 50 U/L — SIGNIFICANT CHANGE UP (ref 12–78)
ANION GAP SERPL CALC-SCNC: 5 MMOL/L — SIGNIFICANT CHANGE UP (ref 5–17)
APTT BLD: 34.2 SEC — SIGNIFICANT CHANGE UP (ref 27.5–35.5)
APTT BLD: 76.3 SEC — HIGH (ref 27.5–35.5)
AST SERPL-CCNC: 49 U/L — HIGH (ref 15–37)
B BURGDOR AB SER QL IA: NEGATIVE — SIGNIFICANT CHANGE UP
B BURGDOR AB SER QL IA: NEGATIVE — SIGNIFICANT CHANGE UP
B MICROTI IGG TITR SER: SIGNIFICANT CHANGE UP TITER
B MICROTI IGG TITR SER: SIGNIFICANT CHANGE UP TITER
B19V IGG SER-ACNC: 2.01 INDEX — HIGH (ref 0–0.9)
B19V IGG+IGM SER-IMP: POSITIVE
B19V IGG+IGM SER-IMP: SIGNIFICANT CHANGE UP
B19V IGM FLD-ACNC: 0.1 INDEX — SIGNIFICANT CHANGE UP (ref 0–0.9)
B19V IGM SER-ACNC: NEGATIVE — SIGNIFICANT CHANGE UP
BASOPHILS # BLD AUTO: 0.03 K/UL — SIGNIFICANT CHANGE UP (ref 0–0.2)
BASOPHILS NFR BLD AUTO: 1 % — SIGNIFICANT CHANGE UP (ref 0–2)
BILIRUB SERPL-MCNC: 0.4 MG/DL — SIGNIFICANT CHANGE UP (ref 0.2–1.2)
BUN SERPL-MCNC: 11 MG/DL — SIGNIFICANT CHANGE UP (ref 7–23)
CALCIUM SERPL-MCNC: 8.4 MG/DL — LOW (ref 8.5–10.1)
CHLORIDE SERPL-SCNC: 106 MMOL/L — SIGNIFICANT CHANGE UP (ref 96–108)
CO2 SERPL-SCNC: 26 MMOL/L — SIGNIFICANT CHANGE UP (ref 22–31)
CREAT SERPL-MCNC: 1.05 MG/DL — SIGNIFICANT CHANGE UP (ref 0.5–1.3)
E CHAFFEENSIS IGG TITR SER IF: SIGNIFICANT CHANGE UP TITER
E CHAFFEENSIS IGG TITR SER IF: SIGNIFICANT CHANGE UP TITER
EOSINOPHIL # BLD AUTO: 0.03 K/UL — SIGNIFICANT CHANGE UP (ref 0–0.5)
EOSINOPHIL NFR BLD AUTO: 1 % — SIGNIFICANT CHANGE UP (ref 0–6)
GLUCOSE SERPL-MCNC: 104 MG/DL — HIGH (ref 70–99)
HCT VFR BLD CALC: 33.5 % — LOW (ref 39–50)
HCT VFR BLD CALC: 36.7 % — LOW (ref 39–50)
HGB BLD-MCNC: 11.6 G/DL — LOW (ref 13–17)
HGB BLD-MCNC: 12.8 G/DL — LOW (ref 13–17)
INR BLD: 1.75 RATIO — HIGH (ref 0.88–1.16)
LYMPHOCYTES # BLD AUTO: 1.21 K/UL — SIGNIFICANT CHANGE UP (ref 1–3.3)
LYMPHOCYTES # BLD AUTO: 46 % — HIGH (ref 13–44)
MAGNESIUM SERPL-MCNC: 2 MG/DL — SIGNIFICANT CHANGE UP (ref 1.6–2.6)
MCHC RBC-ENTMCNC: 33.1 PG — SIGNIFICANT CHANGE UP (ref 27–34)
MCHC RBC-ENTMCNC: 33.1 PG — SIGNIFICANT CHANGE UP (ref 27–34)
MCHC RBC-ENTMCNC: 34.6 GM/DL — SIGNIFICANT CHANGE UP (ref 32–36)
MCHC RBC-ENTMCNC: 34.9 GM/DL — SIGNIFICANT CHANGE UP (ref 32–36)
MCV RBC AUTO: 94.8 FL — SIGNIFICANT CHANGE UP (ref 80–100)
MCV RBC AUTO: 95.7 FL — SIGNIFICANT CHANGE UP (ref 80–100)
MONOCYTES # BLD AUTO: 0.45 K/UL — SIGNIFICANT CHANGE UP (ref 0–0.9)
MONOCYTES NFR BLD AUTO: 17 % — HIGH (ref 2–14)
NEUTROPHILS # BLD AUTO: 0.87 K/UL — LOW (ref 1.8–7.4)
NEUTROPHILS NFR BLD AUTO: 33 % — LOW (ref 43–77)
NRBC # BLD: SIGNIFICANT CHANGE UP /100 WBCS (ref 0–0)
PHOSPHATE SERPL-MCNC: 2.3 MG/DL — LOW (ref 2.5–4.5)
PLATELET # BLD AUTO: 82 K/UL — LOW (ref 150–400)
PLATELET # BLD AUTO: 92 K/UL — LOW (ref 150–400)
POTASSIUM SERPL-MCNC: 4.1 MMOL/L — SIGNIFICANT CHANGE UP (ref 3.5–5.3)
POTASSIUM SERPL-SCNC: 4.1 MMOL/L — SIGNIFICANT CHANGE UP (ref 3.5–5.3)
PROT SERPL-MCNC: 6.9 GM/DL — SIGNIFICANT CHANGE UP (ref 6–8.3)
PROTHROM AB SERPL-ACNC: 19.8 SEC — HIGH (ref 10.6–13.6)
RBC # BLD: 3.5 M/UL — LOW (ref 4.2–5.8)
RBC # BLD: 3.87 M/UL — LOW (ref 4.2–5.8)
RBC # FLD: 13.6 % — SIGNIFICANT CHANGE UP (ref 10.3–14.5)
RBC # FLD: 13.6 % — SIGNIFICANT CHANGE UP (ref 10.3–14.5)
SODIUM SERPL-SCNC: 137 MMOL/L — SIGNIFICANT CHANGE UP (ref 135–145)
WBC # BLD: 2.34 K/UL — LOW (ref 3.8–10.5)
WBC # BLD: 2.64 K/UL — LOW (ref 3.8–10.5)
WBC # FLD AUTO: 2.34 K/UL — LOW (ref 3.8–10.5)
WBC # FLD AUTO: 2.64 K/UL — LOW (ref 3.8–10.5)

## 2021-06-24 PROCEDURE — 88342 IMHCHEM/IMCYTCHM 1ST ANTB: CPT | Mod: 26,59

## 2021-06-24 PROCEDURE — 88364 INSITU HYBRIDIZATION (FISH): CPT | Mod: 26

## 2021-06-24 PROCEDURE — 88365 INSITU HYBRIDIZATION (FISH): CPT | Mod: 26

## 2021-06-24 PROCEDURE — 99233 SBSQ HOSP IP/OBS HIGH 50: CPT | Mod: GC

## 2021-06-24 PROCEDURE — 38222 DX BONE MARROW BX & ASPIR: CPT | Mod: RT

## 2021-06-24 PROCEDURE — 88189 FLOWCYTOMETRY/READ 16 & >: CPT

## 2021-06-24 PROCEDURE — 88305 TISSUE EXAM BY PATHOLOGIST: CPT | Mod: 26

## 2021-06-24 PROCEDURE — 77012 CT SCAN FOR NEEDLE BIOPSY: CPT | Mod: 26

## 2021-06-24 PROCEDURE — 85097 BONE MARROW INTERPRETATION: CPT

## 2021-06-24 PROCEDURE — 88341 IMHCHEM/IMCYTCHM EA ADD ANTB: CPT | Mod: 26,59

## 2021-06-24 PROCEDURE — 88313 SPECIAL STAINS GROUP 2: CPT | Mod: 26

## 2021-06-24 PROCEDURE — 99233 SBSQ HOSP IP/OBS HIGH 50: CPT

## 2021-06-24 PROCEDURE — 88360 TUMOR IMMUNOHISTOCHEM/MANUAL: CPT | Mod: 26,59

## 2021-06-24 PROCEDURE — 88312 SPECIAL STAINS GROUP 1: CPT | Mod: 26

## 2021-06-24 RX ORDER — HEPARIN SODIUM 5000 [USP'U]/ML
6500 INJECTION INTRAVENOUS; SUBCUTANEOUS EVERY 6 HOURS
Refills: 0 | Status: DISCONTINUED | OUTPATIENT
Start: 2021-06-24 | End: 2021-06-25

## 2021-06-24 RX ORDER — HEPARIN SODIUM 5000 [USP'U]/ML
1300 INJECTION INTRAVENOUS; SUBCUTANEOUS
Qty: 25000 | Refills: 0 | Status: DISCONTINUED | OUTPATIENT
Start: 2021-06-24 | End: 2021-06-25

## 2021-06-24 RX ORDER — HEPARIN SODIUM 5000 [USP'U]/ML
1300 INJECTION INTRAVENOUS; SUBCUTANEOUS
Qty: 25000 | Refills: 0 | Status: DISCONTINUED | OUTPATIENT
Start: 2021-06-24 | End: 2021-06-24

## 2021-06-24 RX ORDER — ONDANSETRON 8 MG/1
4 TABLET, FILM COATED ORAL EVERY 6 HOURS
Refills: 0 | Status: DISCONTINUED | OUTPATIENT
Start: 2021-06-24 | End: 2021-06-24

## 2021-06-24 RX ORDER — SODIUM,POTASSIUM PHOSPHATES 278-250MG
1 POWDER IN PACKET (EA) ORAL ONCE
Refills: 0 | Status: COMPLETED | OUTPATIENT
Start: 2021-06-24 | End: 2021-06-24

## 2021-06-24 RX ORDER — FENTANYL CITRATE 50 UG/ML
50 INJECTION INTRAVENOUS
Refills: 0 | Status: DISCONTINUED | OUTPATIENT
Start: 2021-06-24 | End: 2021-06-24

## 2021-06-24 RX ORDER — HEPARIN SODIUM 5000 [USP'U]/ML
6500 INJECTION INTRAVENOUS; SUBCUTANEOUS ONCE
Refills: 0 | Status: COMPLETED | OUTPATIENT
Start: 2021-06-24 | End: 2021-06-24

## 2021-06-24 RX ORDER — ACETAMINOPHEN 500 MG
1000 TABLET ORAL ONCE
Refills: 0 | Status: DISCONTINUED | OUTPATIENT
Start: 2021-06-24 | End: 2021-06-24

## 2021-06-24 RX ORDER — SODIUM CHLORIDE 9 MG/ML
1000 INJECTION INTRAMUSCULAR; INTRAVENOUS; SUBCUTANEOUS
Refills: 0 | Status: DISCONTINUED | OUTPATIENT
Start: 2021-06-24 | End: 2021-06-24

## 2021-06-24 RX ORDER — OXYCODONE HYDROCHLORIDE 5 MG/1
5 TABLET ORAL ONCE
Refills: 0 | Status: DISCONTINUED | OUTPATIENT
Start: 2021-06-24 | End: 2021-06-24

## 2021-06-24 RX ORDER — HEPARIN SODIUM 5000 [USP'U]/ML
3000 INJECTION INTRAVENOUS; SUBCUTANEOUS EVERY 6 HOURS
Refills: 0 | Status: DISCONTINUED | OUTPATIENT
Start: 2021-06-24 | End: 2021-06-25

## 2021-06-24 RX ADMIN — SODIUM CHLORIDE 75 MILLILITER(S): 9 INJECTION INTRAMUSCULAR; INTRAVENOUS; SUBCUTANEOUS at 09:24

## 2021-06-24 RX ADMIN — Medication 81 MILLIGRAM(S): at 09:14

## 2021-06-24 RX ADMIN — Medication 100 MILLIGRAM(S): at 09:14

## 2021-06-24 RX ADMIN — LOSARTAN POTASSIUM 100 MILLIGRAM(S): 100 TABLET, FILM COATED ORAL at 09:15

## 2021-06-24 RX ADMIN — AMLODIPINE BESYLATE 5 MILLIGRAM(S): 2.5 TABLET ORAL at 09:14

## 2021-06-24 RX ADMIN — Medication 1 PACKET(S): at 10:11

## 2021-06-24 RX ADMIN — HEPARIN SODIUM 1300 UNIT(S)/HR: 5000 INJECTION INTRAVENOUS; SUBCUTANEOUS at 16:55

## 2021-06-24 RX ADMIN — Medication 1 TABLET(S): at 09:14

## 2021-06-24 RX ADMIN — SODIUM CHLORIDE 75 MILLILITER(S): 9 INJECTION INTRAMUSCULAR; INTRAVENOUS; SUBCUTANEOUS at 17:02

## 2021-06-24 RX ADMIN — Medication 100 MILLIGRAM(S): at 21:24

## 2021-06-24 RX ADMIN — ZOLPIDEM TARTRATE 5 MILLIGRAM(S): 10 TABLET ORAL at 22:20

## 2021-06-24 RX ADMIN — Medication 100 MILLIGRAM(S): at 09:15

## 2021-06-24 RX ADMIN — HEPARIN SODIUM 1300 UNIT(S)/HR: 5000 INJECTION INTRAVENOUS; SUBCUTANEOUS at 09:21

## 2021-06-24 RX ADMIN — Medication 1 MILLIGRAM(S): at 09:14

## 2021-06-24 NOTE — PROGRESS NOTE ADULT - SUBJECTIVE AND OBJECTIVE BOX
71 YO Male With a PMH of Frequency of urination, ED, BPH, Ureteral stricture, HTN, hyperlipidemia, PAT, Asthma, CAD, coronary artery bypass surgery, Aortic valve replacement, inguinal hernia repair, cystoscopy who presents with a chief complaint of Fever     Subjective: Patient was seen and examined by me this morning at bedside. Patient will go for bone marrow biopsy today, patient looks better today, Afebrile for last 24 hours.     REVIEW OF SYSTEMS:  CONSTITUTIONAL: No weakness, fevers or chills currently.   EYES/ENT: No visual changes;  No vertigo or throat pain   NECK: No pain or stiffness  RESPIRATORY: No cough, wheezing, hemoptysis; No shortness of breath  CARDIOVASCULAR: No chest pain or palpitations  GASTROINTESTINAL: No abdominal or epigastric pain. No nausea, vomiting; No diarrhea or constipation.   GENITOURINARY: No dysuria, frequency or hematuria  NEUROLOGICAL: No numbness or weakness  SKIN: No itching, burning, rashes, or lesions.     PHYSICAL EXAM:  Vital Signs Last 24 Hrs  T(C): 36.6 (24 Jun 2021 08:24), Max: 37.3 (23 Jun 2021 23:31)  T(F): 97.8 (24 Jun 2021 08:24), Max: 99.1 (23 Jun 2021 23:31)  HR: 62 (24 Jun 2021 08:24) (55 - 70)  BP: 132/81 (24 Jun 2021 08:24) (120/83 - 146/73)  BP(mean): --  RR: 18 (24 Jun 2021 08:24) (18 - 18)  SpO2: 100% (24 Jun 2021 08:24) (99% - 100%)    Constitutional: Pt lying in bed, awake and alert, NAD  HEENT: EOMI, normal hearing, moist mucous membranes  Neck: Soft and supple, no JVD  Respiratory: CTABL, No wheezing, rales or rhonchi  Cardiovascular: S1S2+, RRR, no M/G/R  Gastrointestinal: BS+, soft, NT/ND, no guarding, no rebound  Extremities: No peripheral edema  Vascular: 2+ peripheral pulses  Neurological: AAOx3, no focal deficits  Musculoskeletal: 5/5 strength b/l upper and lower extremities  Skin: No rashes, no janeway lesions, osler nodes,     MEDICATIONS  (STANDING):  amLODIPine   Tablet 5 milliGRAM(s) Oral daily  aspirin enteric coated 81 milliGRAM(s) Oral daily  atorvastatin 80 milliGRAM(s) Oral at bedtime  folic acid 1 milliGRAM(s) Oral daily  heparin  Infusion. 1300 Unit(s)/Hr (13 mL/Hr) IV Continuous <Continuous>  losartan 100 milliGRAM(s) Oral daily  metoprolol tartrate 100 milliGRAM(s) Oral every 12 hours  multivitamin 1 Tablet(s) Oral daily  sodium chloride 0.9%. 1000 milliLiter(s) (75 mL/Hr) IV Continuous <Continuous>  thiamine 100 milliGRAM(s) Oral daily    MEDICATIONS  (PRN):  acetaminophen   Tablet .. 650 milliGRAM(s) Oral every 4 hours PRN Temp greater or equal to 38C (100.4F)  heparin   Injectable 7000 Unit(s) IV Push every 6 hours PRN For aPTT less than 40  heparin   Injectable 3500 Unit(s) IV Push every 6 hours PRN For aPTT between 40 - 57      LABS: All Labs Reviewed:                        13.2   1.93  )-----------( 69       ( 22 Jun 2021 08:17 )             37.5     06-22    130<L>  |  98  |  13  ----------------------------<  127<H>  3.8   |  25  |  1.19    Ca    8.4<L>      22 Jun 2021 08:17  Phos  2.3     06-22  Mg     1.7     06-22    TPro  7.1  /  Alb  3.5  /  TBili  1.0  /  DBili  x   /  AST  53<H>  /  ALT  48  /  AlkPhos  63  06-22    PT/INR - ( 22 Jun 2021 08:17 )   PT: 16.4 sec;   INR: 1.44 ratio         PTT - ( 21 Jun 2021 19:22 )  PTT:35.4 sec      Blood Culture:   I&O's Summary    21 Jun 2021 07:01  -  22 Jun 2021 07:00  --------------------------------------------------------  IN: 1000 mL / OUT: 0 mL / NET: 1000 mL      CAPILLARY BLOOD GLUCOSE          RADIOLOGY/EKG:    < from: Xray Chest 1 View-PORTABLE IMMEDIATE (06.21.21 @ 19:12) >  IMPRESSION:   No evidence of active chest disease.              DUY WILKERSON MD; Attending Radiologist  This document has been electronically signed. Jun 22 2021  8:51AM    < end of copied text >  < from: CT Chest No Cont (06.22.21 @ 08:27) >  IMPRESSION:  *  No acute chest pathology.            SUSANNA ARANGO MD; Attending Radiologist  This document has been electronically signed. Jun 22 2021  9:26AM    < end of copied text >

## 2021-06-24 NOTE — PROGRESS NOTE ADULT - SUBJECTIVE AND OBJECTIVE BOX
Patient is a 70y old  Male who presents with a chief complaint of Fever.       HPI:  71 yo Male with PMHx of CAD, s/p failed TAVR 2016, s/p Aortic valve replacement (mechanical) 2017, HTN, HLD was sent to the ED by Dr. Herrera.  Patient mentions he started having chills, malaise and fevers on , max temp that he had was 102.  He went to Dr. Herrera's office and was told to come to the ED.  Patient has been taking Tylenol 325 x2 every time he has fever. Patient denies any chest pain, abdominal pain, nausea, vomiting, diarrhea, constipation, urinary frequency or urgency or cough.  Patient drinks 4-6 beers on a daily basis.        [facility]  [facility] or [facility].   Still febrile at night slightly lower fever  TTE with no indications of vegetations     [facility]  No symptoms  Bone marrow biopsy done today  INR still not therapeutic      PAST MEDICAL & SURGICAL HISTORY:  Frequency of urination    ED (erectile dysfunction)    BPH (benign prostatic hyperplasia)    Ureteral stricture    H/O urethral stricture    HTN (hypertension)    H/O hyperlipidemia    PAT (paroxysmal atrial tachycardia)    Asthma    CAD (coronary artery disease)    H/O coronary artery bypass surgery    Aortic valve replaced  [facility]    H/O inguinal hernia repair    H/O cystoscopy  x2        MEDICATIONS  (STANDING):  amLODIPine   Tablet 5 milliGRAM(s) Oral daily  aspirin enteric coated 81 milliGRAM(s) Oral daily  atorvastatin 80 milliGRAM(s) Oral at bedtime  cefepime  Injectable.      cefepime  Injectable. 1000 milliGRAM(s) IV Push every 8 hours  heparin   Injectable 7000 Unit(s) IV Push once  heparin  Infusion.  Unit(s)/Hr (16 mL/Hr) IV Continuous <Continuous>  losartan 100 milliGRAM(s) Oral daily  metoprolol tartrate 100 milliGRAM(s) Oral every 12 hours  multivitamin 1 Tablet(s) Oral daily  sodium chloride 0.9%. 1000 milliLiter(s) (75 mL/Hr) IV Continuous <Continuous>  vancomycin  IVPB 1000 milliGRAM(s) IV Intermittent every 12 hours  vancomycin  IVPB        MEDICATIONS  (PRN):  acetaminophen   Tablet .. 650 milliGRAM(s) Oral every 4 hours PRN Temp greater or equal to 38C (100.4F)  heparin   Injectable 7000 Unit(s) IV Push every 6 hours PRN For aPTT less than 40  heparin   Injectable 3500 Unit(s) IV Push every 6 hours PRN For aPTT between 40 - 57      FAMILY HISTORY: No family history of premature CAD or SCD.       SOCIAL HISTORY: no recent smoking     REVIEW OF SYSTEMS:  CONSTITUTIONAL:    No fatigue, malaise, lethargy.  c/o fever or chills.   RESPIRATORY:  No cough.  No wheeze.  No hemoptysis.  No shortness of breath.  CARDIOVASCULAR:  No chest pains.  No palpitations. No shortness of breath, No orthopnea or PND.  GASTROINTESTINAL:  No abdominal pain.  No nausea or vomiting.    GENITOURINARY:    No hematuria.    MUSCULOSKELETAL:  No musculoskeletal pain.  No joint swelling.  No arthritis.  NEUROLOGICAL:  No tingling or numbness or weakness.  PSYCHIATRIC:  No confusion  SKIN:  No rashes.          Vital Signs Last 24 Hrs  T(C): 37.4 (2021 07:50), Max: 39.2 (2021 22:14)  T(F): 99.3 (2021 07:50), Max: 102.5 (2021 22:14)  HR: 81 (2021 10:21) (72 - 95)  BP: 116/78 (2021 10:21) (116/78 - 154/75)  BP(mean): 101 (2021 18:32) (101 - 101)  RR: 17 (2021 07:50) (17 - 18)  SpO2: 95% (2021 07:50) (95% - 100%)    PHYSICAL EXAM-    Constitutional: The patient appears to be normal, well developed, well nourished and alert and oriented to time, place and person. The patient does not appear acutely ill.     Head: Head is normocephalic and atraumatic.      Neck: No jugular venous distention. No audible carotid bruits. There are strong carotid pulses bilaterally. No JVD.     Cardiovascular: Regular rate and rhythm without S3, S4. No murmurs or rubs are appreciated.   Mechanical heart sounds +     Respiratory: Breathsounds are normal. No rales. No wheezing.    Abdomen: Soft, nontender, nondistended with positive bowel sounds.      Extremity: No tenderness. No  pitting edema         INTERPRETATION OF TELEMETRY: no ton     ECG: Sinus rythm, no ST T changes.     I&O's Detail    2021 07:01  -  2021 07:00  --------------------------------------------------------  IN:    Oral Fluid: 1000 mL  Total IN: 1000 mL    OUT:  Total OUT: 0 mL    Total NET: 1000 mL          LABS:                        13.2   1.93  )-----------( 69       ( 2021 08:17 )             37.5     06-22    130<L>  |  98  |  13  ----------------------------<  127<H>  3.8   |  25  |  1.19    Ca    8.4<L>      2021 08:17  Phos  2.3       Mg     1.7         TPro  7.1  /  Alb  3.5  /  TBili  1.0  /  DBili  x   /  AST  53<H>  /  ALT  48  /  AlkPhos  63  06-22        PT/INR - ( 2021 08:17 )   PT: 16.4 sec;   INR: 1.44 ratio         PTT - ( 2021 19:22 )  PTT:35.4 sec  Urinalysis Basic - ( 2021 19:22 )    Color: Yellow / Appearance: Slightly Turbid / S.010 / pH: x  Gluc: x / Ketone: Small  / Bili: Negative / Urobili: Negative mg/dL   Blood: x / Protein: 30 mg/dL / Nitrite: Negative   Leuk Esterase: Negative / RBC: >50 /HPF / WBC Negative   Sq Epi: x / Non Sq Epi: Occasional / Bacteria: Occasional      I&O's Summary    2021 07:01  -  2021 07:00  --------------------------------------------------------  IN: 1000 mL / OUT: 0 mL / NET: 1000 mL      BNP  RADIOLOGY & ADDITIONAL STUDIES:

## 2021-06-24 NOTE — PROGRESS NOTE ADULT - SUBJECTIVE AND OBJECTIVE BOX
Date of service: 06-24-21 @ 12:23      Patient sitting in chair; was afebrile overnight; no other specific complaints      ROS: no fever or chills; denies dizziness, no HA, no SOB or cough, no abdominal pain, no diarrhea or constipation; no dysuria, no urinary frequency, no legs pain, no rashes    MEDICATIONS  (STANDING):  amLODIPine   Tablet 5 milliGRAM(s) Oral daily  aspirin enteric coated 81 milliGRAM(s) Oral daily  atorvastatin 80 milliGRAM(s) Oral at bedtime  folic acid 1 milliGRAM(s) Oral daily  heparin  Infusion. 1300 Unit(s)/Hr (13 mL/Hr) IV Continuous <Continuous>  losartan 100 milliGRAM(s) Oral daily  metoprolol tartrate 100 milliGRAM(s) Oral every 12 hours  multivitamin 1 Tablet(s) Oral daily  sodium chloride 0.9%. 1000 milliLiter(s) (75 mL/Hr) IV Continuous <Continuous>  thiamine 100 milliGRAM(s) Oral daily    MEDICATIONS  (PRN):  acetaminophen   Tablet .. 650 milliGRAM(s) Oral every 4 hours PRN Temp greater or equal to 38C (100.4F)  zolpidem 5 milliGRAM(s) Oral at bedtime PRN Insomnia      Vital Signs Last 24 Hrs  T(C): 36.6 (24 Jun 2021 08:24), Max: 37.3 (23 Jun 2021 23:31)  T(F): 97.8 (24 Jun 2021 08:24), Max: 99.1 (23 Jun 2021 23:31)  HR: 62 (24 Jun 2021 08:24) (55 - 70)  BP: 132/81 (24 Jun 2021 08:24) (120/83 - 146/73)  BP(mean): --  RR: 18 (24 Jun 2021 08:24) (18 - 18)  SpO2: 100% (24 Jun 2021 08:24) (99% - 100%)        Physical Exam:          Constitutional: frail looking  HEENT: NC/AT, EOMI, PERRLA, conjunctivae clear; ears and nose atraumatic; pharynx clear  Neck: supple; thyroid not palpable  Back: no tenderness  Respiratory: respiratory effort normal; clear to auscultation  Cardiovascular: S1S2 regular, no murmurs  Abdomen: soft, not tender, not distended, positive BS; no liver or spleen organomegaly  Genitourinary: no suprapubic tenderness  Musculoskeletal: no muscle tenderness, no joint swelling or tenderness  Neurological/ Psychiatric: AxOx3, judgement and insight normal;  moving all extremities  Skin: no rashes; no palpable lesions    Labs: all available labs reviewed                       Labs:                        12.8   2.64  )-----------( 92       ( 24 Jun 2021 08:08 )             36.7     06-24    137  |  106  |  11  ----------------------------<  104<H>  4.1   |  26  |  1.05    Ca    8.4<L>      24 Jun 2021 08:08  Phos  2.3     06-24  Mg     2.0     06-24    TPro  6.9  /  Alb  3.2<L>  /  TBili  0.4  /  DBili  x   /  AST  49<H>  /  ALT  50  /  AlkPhos  65  06-24           Cultures:       Babesia microti PCR, Blood. (collected 06-22-21 @ 12:07)  Source: .Blood None  Final Report (06-22-21 @ 19:32):    Babesia microti PCR    Results: NOT detected    ***************Result Note*************    The detection of Babesia microti by PCR has only been    validated for whole blood; this test has not been approved    by the US Food and Drug Administration (FDA). Performance    characteristics of this assay have been determined by    [employer] The clinical significance    of results should be considered in conjunction with the    overall clinical presentation of the patient. Result is not    intended to be used as the sole means for clinical diagnosis    or patient management decisions.    One negative sample does not necessarily rule    out the presence of a parasitic infection.    Culture - Blood (collected 06-21-21 @ 19:22)  Source: .Blood Blood  Preliminary Report (06-23-21 @ 06:01):    No growth to date.    Culture - Urine (collected 06-21-21 @ 19:22)  Source: .Urine Clean Catch (Midstream)  Final Report (06-23-21 @ 22:21):    Normal Urogenital sean present    Culture - Blood (collected 06-21-21 @ 19:22)  Source: .Blood Blood-Peripheral  Preliminary Report (06-23-21 @ 06:01):    No growth to date.      C-Reactive Protein, Serum: 82 mg/L (06-21-21 @ 19:22)        Parvovirus IgG/IgM Antibodies (06.23.21 @ 10:32)    Parvovirus IgG Antibody: 2.01 Index    Parvovirus IgM Antibody: 0.10 Index    Parvovirus IgM Interpretation: Negative    Parvovirus Interpretation: Implies past exposure. Minimal  risk of B19V infection.        Ehrlichia/Anaplasma PCR, Blood (06.22.21 @ 12:07)    Anaplasma phagocytophilum: Negative    Ehrlichia chaffeensis: Negative    Ehrlichia ewingii/canis: Negative    Ehrlichia muris-like: Negative: -------------------ADDITIONAL INFORMATION-------------------  This test was developed and its performance characteristics  determined by [facility] in a manner consistent with CLIA  requirements. This test has not been cleared or approved by  the [place] Food and Drug Administration.  Test Performed by:  [facility] [place] [address]  : Perez Lau M.D. Ph.D.; CLIA# [id number]        Cytomegalovirus IgM Antibody, Serum (06.23.21 @ 10:32)    CMV IgM Antibody: <8.0 AU/mL    CMV IgM Interpretation: Negative: Method: Liaison Chemiluminescent Immunoassay  Reference ranges: (values expressed as AU/mL)              Negative     < 30.0 AU/mL              Equivocal     30.0 - 34.9 AU/mL              Positive      > 35.0 AU/mL    Cytomegalovirus IgG Antibody, Serum (06.23.21 @ 10:32)    CMV IgG Antibody: 5.70 U/mL    CMV IgG Interpretation: Positive: Method: Liason Chemiluminescent Immunoassay  Reference Range: (values expressed as U/mL)             Negative        < 0.60        U/mL             Equivocal      0.60 - 0.69  U/mL             Positive          >= 0.70      U/mL  The presence of Cytomegalovirus IgG antibody or seroconversion may  indicate recent antigenic stimulation but are not per se confirmatory for  either recent primary infection or reactivation of a pre-existing latent  process with active viral replication.  The titer of asingle specimen  should not be used to aid in the diagnosis of recent infection.  The  assay for the presence of anti-CMV IgM antibody should be used to  diagnose recent infection.                                            Borrelia burgdorferi IgG/IgM Antibodies (06.22.21 @ 12:07)    LYME IgG/IgM Antibodies Result: 0.07 Index    Lyme C6 Interpretation: Negative: METHOD: Liasion Chemiluminescent Immunoassay      Reference Range: (values expressed as Lyme Index )                                < 0.90        Negative                                0.90 - 1.09   Equivocal                                >= 1.10     Positive  CDC/ASTPHLD Guidelines recommend that all samples judged equivocal or  positive be re-tested by confirmatory immunoassays.          Nkechi-Barr Virus Serologic Test (06.22.21 @ 12:07)    Nkechi-Barr Virus Capsid Antigen IgG: Positive: Nkechi-Barr Virus VCA IgG Antibody  Method: Liaison Chemiluminescent Immunoassay  Reference Range: (Expressed in U/mL)       Negative        < 18.0 U/mL       Equivocal      18.0 - 21.9 U/mL       Positive         >= 22.0 U/mL    Nkechi-Barr Nuclear Antigen: Positive: Nkechi-Barr Virus NA IgG Antibody  Method: Liaison Chemiluminescent Immunoassay  Reference Range: (Expressed in U/mL)       Negative        < 18.0 U/mL       Equivocal      18.0 - 21.9 U/mL       Positive         >= 22.0 U/mL    Nkechi Barr Virus IgM Antibody: Negative: Nkechi-Barr Virus VCA IgM Antibody  Method: Liaison Chemiluminescent Immunoassay  Reference Range: (Expressed in U/mL)       Negative     < 36.0 U/mL       Equivocal    36.0 - 43.9 U/mL       Positive       >= 44.0 U/mL    Nkechi-Barr Virus Early Antigen: Positive: Nkehci-Barr Virus EA IgG Antibody  Method: Liaison Chemiluminescent Immunoassay  Reference Range: (Expressed in U/mL)       Negative        < 9.0 U/mL       Equivocal       9.0 - 10.9 U/mL       Positive          >= 11.0 U/mL    EBV Interpretation: See Note: INTERPRETATION OF NKECHI BARR VIRUS (EBV) ANTIBODY RESULTS  Sero-Negative   EBV VCA IGG AB - -  NEG   EBV NA IGG AB    - -  NEG   EBV VCA IGM AB - - NEG   EBV EA IGG AB    - -  NEG  Suspected Primary Infection (Early Phase)   EBV VCA IGG AB - -  NEG   EBV NA IGG AB    - -  NEG   EBV VCA IGM AB - - POS   EBV EA IGG AB     - - NEG  Past EBV Infection ( Convalescence)   EBV VCA IGG AB  - - POS   EBV NA IGG AB     - - NEG   EBV VCA IGM AB - - POS   EBV EA IGG AB     - - POS/NEG  Past EBV Infection  EBV VCA IGG AB - - POS   EBV NA IGG AB  - -  POS   EBV VCA IGM AB - -NEG   EBV EA IGG AB   - - POS/NEG  Reactivated Infection   EBV VCA IGG AB - -  POS   EBV NA IGG AB    - -  POS   EBV VCA IGM AB - - POS/NEG   EBV EA IGG AB     - - POS    EBV VCA IgG EIA: >750.0 U/mL    EBNA IgG EIA: >600.0 U/mL    EBV VCA IgM EIA: <10.0 U/mL    EBV EA Ab EIA: 43.6 U/mL              < from: TTE Echo Complete w/o Contrast w/ Doppler (06.22.21 @ 13:40) >   Impression     Summary     The left ventricle is normal in size, wall motion and contractility.   Mild concentric left ventricular hypertrophy is present.   Estimated left ventricular ejection fraction is 60 %.   Well seated prosthetic [employer]. Gera valve in the aortic position. Peak   trans-prosthetic gradient is within normal limitations for this type of   prosthesis.   Trace aortic regurgitation is present.   Fibrocalcific changes noted to the mitral valve leaflets with preserved   leaflet excursion.   Mild (1+) mitral regurgitation is present.   Moderate Tricuspid regurgitation is present.   Mild pulmonary hypertension.     No obvious vegetations are seen in this study. Consider BENJAMIN if clinically   appropriate.    < end of copied text >                 < from: CT Chest No Cont (06.22.21 @ 08:27) >    IMPRESSION:  *  No acute chest pathology.      < end of copied text >        Radiology: all available radiological tests reviewed    Advanced directives addressed: full resuscitation

## 2021-06-24 NOTE — PROGRESS NOTE ADULT - ASSESSMENT
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 Herrera's office for chills, malase and fever since 6/20/21. TMax of 102.      #SIRS Criteria  - No longer meets SIRS  - AFebrile last 24 hours  - Unknown source of infection  - Infectious Disease consult appreciated: Stop antibiotics   - Tick bourne Illness, Babesia, Erlichiea, Lyme Western Blot, Viral EBV, Echocardiogram negative so far.   - Follow up Blood, Urine Cultures, Rapid Viral Panel negative so far.   - Cardiology Consult Appreciated Echo for Endocarditis rule out. No Janeway Lesions, Osler Nodes. TTE negative for lesions    #Pancytopenia  - Possibly due to JODI Consumption  - Follow up Folate levels  - Phosphate decreased, replenished, continue to follow  - Follow up Heme/Onc Consult, Bone Marrow Biopsy today. Held Heparin 2 hours prior to procedure.   - Follow up CBC in AM    #CAD  - Failed TAVR, Aortic mechanical valve replacement 2017  - Resume Heparin after Bone Marrow Biopsy     #HTN/HLD  - Continue Home Losartan, Amlodipine, Metoprolol,  - Continue Atorvastatin 40    #Diet  Diet, DASH/TLC      #DVT PPX  - Coumadin 8, Subtheraputic  - Heparin after procedure.   - SCDs  - PT/INR in AM    #Code Status   - Full Code    #Dispo  - Pending Cultures, Clinical Improvement, Bone Marrow Biopsy Results

## 2021-06-24 NOTE — PROGRESS NOTE ADULT - ASSESSMENT
Fever- Mechanical aortic valve in place.  2 sets blood cx, pending results.  2 D echo with no evidence of endocarditis  possibly viral origin  If indicated will consider BENJAMIN for better evaluation of the prosthetic valve  Continue anticoagulation INR not in target  Recall if needed

## 2021-06-24 NOTE — PROGRESS NOTE ADULT - SUBJECTIVE AND OBJECTIVE BOX
Subjective:    Awake, alert. Looks much better today. No cough or sputum.    MEDICATIONS  (STANDING):  amLODIPine   Tablet 5 milliGRAM(s) Oral daily  aspirin enteric coated 81 milliGRAM(s) Oral daily  atorvastatin 80 milliGRAM(s) Oral at bedtime  folic acid 1 milliGRAM(s) Oral daily  losartan 100 milliGRAM(s) Oral daily  metoprolol tartrate 100 milliGRAM(s) Oral every 12 hours  multivitamin 1 Tablet(s) Oral daily  sodium chloride 0.9%. 1000 milliLiter(s) (75 mL/Hr) IV Continuous <Continuous>  thiamine 100 milliGRAM(s) Oral daily    MEDICATIONS  (PRN):  acetaminophen   Tablet .. 650 milliGRAM(s) Oral every 4 hours PRN Temp greater or equal to 38C (100.4F)  zolpidem 5 milliGRAM(s) Oral at bedtime PRN Insomnia      Allergies    No Known Allergies    Intolerances        Vital Signs Last 24 Hrs  T(C): 36.6 (24 Jun 2021 08:24), Max: 37.3 (23 Jun 2021 23:31)  T(F): 97.8 (24 Jun 2021 08:24), Max: 99.1 (23 Jun 2021 23:31)  HR: 62 (24 Jun 2021 08:24) (55 - 70)  BP: 132/81 (24 Jun 2021 08:24) (120/83 - 146/73)  BP(mean): --  RR: 18 (24 Jun 2021 08:24) (18 - 18)  SpO2: 100% (24 Jun 2021 08:24) (99% - 100%)    PHYSICAL EXAMINATION:    NECK:  Supple. No lymphadenopathy. Jugular venous pressure not elevated.  HEART:   The cardiac impulse has a normal quality. Reg., Nl S1 and S2.    CHEST:  Chest is clear to auscultation. Normal respiratory effort.  ABDOMEN:  Soft and nontender.   EXTREMITIES:  There is no edema.       LABS:                        12.8   2.64  )-----------( 92       ( 24 Jun 2021 08:08 )             36.7     06-24    137  |  106  |  11  ----------------------------<  104<H>  4.1   |  26  |  1.05    Ca    8.4<L>      24 Jun 2021 08:08  Phos  2.3     06-24  Mg     2.0     06-24    TPro  6.9  /  Alb  3.2<L>  /  TBili  0.4  /  DBili  x   /  AST  49<H>  /  ALT  50  /  AlkPhos  65  06-24    PT/INR - ( 24 Jun 2021 08:08 )   PT: 19.8 sec;   INR: 1.75 ratio         PTT - ( 24 Jun 2021 08:08 )  PTT:76.3 sec      RADIOLOGY & ADDITIONAL TESTS:    Assessment and Recommendation:   · Assessment	  Antibiotics D/C'ed as per ID; cultures and serologies -Neg.  Follow up on blood cultures.       Pancytopenia-for Bone Marrow Bx today.    Echocardiogram.  Cardiology following.     On heparin, given subtherapeutic INR.     Problem Selector:  Problem/Recommendation - 1:  Febrile illness, acute.  Problem/Recommendation - 2:  ·  Aortic valve replaced.   Problem/Recommendation - 3:  ·  Leukopenia.   Problem/Recommendation - 4:  ·  Thrombocytopenia.   Problem/Recommendation - 5:  ·  CAD (coronary artery disease).   Problem/Recommendation - 6:  Ureteral stricture.  Problem/Recommendation - 7:  HTN (hypertension).  Problem/Recommendation - 8:  PAT (paroxysmal atrial tachycardia).

## 2021-06-24 NOTE — PROGRESS NOTE ADULT - ATTENDING COMMENTS
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 Herrera's office for chills, malase and fever since 6/20/21. TMax of 102.      #SIRS Criteria  - No longer meets SIRS  - AFebrile last 24 hours  - Unknown source of infection  - Infectious Disease consult appreciated: Stop antibiotics   - Tick bourne Illness, Babesia, Erlichiea, Lyme Western Blot, Viral EBV, Echocardiogram negative so far.   - Follow up Blood, Urine Cultures, Rapid Viral Panel negative so far.   - Cardiology Consult Appreciated Echo for Endocarditis rule out. No Janeway Lesions, Osler Nodes. TTE negative for lesions
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 Herrera's office for chills, malase and fever since 6/20/21. TMax of 102.      #SIRS Criteria  - Resolved, Examined at 7 AM this morning, No longer meets SIRS  - Febrile to 102 overnight  - Unknown source of infection  - Infectious Disease consult appreciated: Stop antibiotics   - Tick bourne Illness, Babesia, Erlichiea, Lyme Western Blot, Viral EBV, Echocardiogram negative so far.   - Follow up Blood, Urine Cultures, Rapid Viral Panel negative so far.   - Cardiology Consult Appreciated Echo for Endocarditis rule out. No Janeway Lesions, Osler Nodes. TTE negative for lesions

## 2021-06-24 NOTE — PROGRESS NOTE ADULT - ASSESSMENT
71 yo Male with PMHx of CAD, s/p failed TAVR 2016, s/p Aortic valve replacement (mechanical) 2017, HTN, HLD admitted on 6/21 for evaluation of fever, chills that started on 6/20. has no other specific complaints, no travel, no recent dental work, labs upon admission show leukopenia and thrombocytopenia. No known tick or bug bites, however, mows his lawn and in the garden.   1. Patient admitted with acute febrile illness, and lab abnormalities, low wbc and low platelets; however review of symptoms and physical essentially benign  - follow up cultures   - iv hydration and supportive care   - serial cbc and monitor temperature   - reviewed prior medical records to evaluate for resistant or atypical pathogens   - negative Babesia, Lyme, EBV, echocardiogram, ehrlichea is negative  - will check Parvo virus and CMV serology---- all negative for acute infection  - to have bone marrow biopsy  - heme eval in progress  If further ID issues please reconsult     2. other issues; per medicine

## 2021-06-25 ENCOUNTER — TRANSCRIPTION ENCOUNTER (OUTPATIENT)
Age: 71
End: 2021-06-25

## 2021-06-25 VITALS
HEART RATE: 76 BPM | SYSTOLIC BLOOD PRESSURE: 111 MMHG | TEMPERATURE: 99 F | RESPIRATION RATE: 19 BRPM | OXYGEN SATURATION: 98 % | DIASTOLIC BLOOD PRESSURE: 67 MMHG

## 2021-06-25 LAB
APTT BLD: 71.5 SEC — HIGH (ref 27.5–35.5)
APTT BLD: 77.9 SEC — HIGH (ref 27.5–35.5)
APTT BLD: 98.4 SEC — HIGH (ref 27.5–35.5)
HCT VFR BLD CALC: 30.9 % — LOW (ref 39–50)
HCT VFR BLD CALC: 32.3 % — LOW (ref 39–50)
HGB BLD-MCNC: 11.1 G/DL — LOW (ref 13–17)
HGB BLD-MCNC: 11.2 G/DL — LOW (ref 13–17)
INR BLD: 1.5 RATIO — HIGH (ref 0.88–1.16)
MCHC RBC-ENTMCNC: 32.7 PG — SIGNIFICANT CHANGE UP (ref 27–34)
MCHC RBC-ENTMCNC: 34 PG — SIGNIFICANT CHANGE UP (ref 27–34)
MCHC RBC-ENTMCNC: 34.7 GM/DL — SIGNIFICANT CHANGE UP (ref 32–36)
MCHC RBC-ENTMCNC: 35.9 GM/DL — SIGNIFICANT CHANGE UP (ref 32–36)
MCV RBC AUTO: 94.2 FL — SIGNIFICANT CHANGE UP (ref 80–100)
MCV RBC AUTO: 94.8 FL — SIGNIFICANT CHANGE UP (ref 80–100)
PLATELET # BLD AUTO: 89 K/UL — LOW (ref 150–400)
PLATELET # BLD AUTO: 98 K/UL — LOW (ref 150–400)
PROTHROM AB SERPL-ACNC: 17.2 SEC — HIGH (ref 10.6–13.6)
RBC # BLD: 3.26 M/UL — LOW (ref 4.2–5.8)
RBC # BLD: 3.43 M/UL — LOW (ref 4.2–5.8)
RBC # FLD: 13.4 % — SIGNIFICANT CHANGE UP (ref 10.3–14.5)
RBC # FLD: 13.4 % — SIGNIFICANT CHANGE UP (ref 10.3–14.5)
WBC # BLD: 2.83 K/UL — LOW (ref 3.8–10.5)
WBC # BLD: 3.06 K/UL — LOW (ref 3.8–10.5)
WBC # FLD AUTO: 2.83 K/UL — LOW (ref 3.8–10.5)
WBC # FLD AUTO: 3.06 K/UL — LOW (ref 3.8–10.5)

## 2021-06-25 PROCEDURE — 99233 SBSQ HOSP IP/OBS HIGH 50: CPT

## 2021-06-25 PROCEDURE — 99239 HOSP IP/OBS DSCHRG MGMT >30: CPT

## 2021-06-25 RX ORDER — ENOXAPARIN SODIUM 100 MG/ML
40 INJECTION SUBCUTANEOUS
Qty: 560 | Refills: 0
Start: 2021-06-25 | End: 2021-07-08

## 2021-06-25 RX ORDER — CX-024414 0.2 MG/ML
0.5 INJECTION, SUSPENSION INTRAMUSCULAR
Qty: 0 | Refills: 0 | DISCHARGE

## 2021-06-25 RX ORDER — ZOLPIDEM TARTRATE 10 MG/1
1 TABLET ORAL
Qty: 0 | Refills: 0 | DISCHARGE
Start: 2021-06-25

## 2021-06-25 RX ORDER — ENOXAPARIN SODIUM 100 MG/ML
120 INJECTION SUBCUTANEOUS
Qty: 840 | Refills: 0
Start: 2021-06-25 | End: 2021-07-01

## 2021-06-25 RX ORDER — ENOXAPARIN SODIUM 100 MG/ML
120 INJECTION SUBCUTANEOUS ONCE
Refills: 0 | Status: COMPLETED | OUTPATIENT
Start: 2021-06-25 | End: 2021-06-25

## 2021-06-25 RX ORDER — ATORVASTATIN CALCIUM 80 MG/1
1 TABLET, FILM COATED ORAL
Qty: 0 | Refills: 0 | DISCHARGE
Start: 2021-06-25

## 2021-06-25 RX ORDER — ENOXAPARIN SODIUM 100 MG/ML
1 INJECTION SUBCUTANEOUS
Qty: 0 | Refills: 0 | DISCHARGE
Start: 2021-06-25

## 2021-06-25 RX ADMIN — AMLODIPINE BESYLATE 5 MILLIGRAM(S): 2.5 TABLET ORAL at 10:13

## 2021-06-25 RX ADMIN — LOSARTAN POTASSIUM 100 MILLIGRAM(S): 100 TABLET, FILM COATED ORAL at 10:14

## 2021-06-25 RX ADMIN — ENOXAPARIN SODIUM 120 MILLIGRAM(S): 100 INJECTION SUBCUTANEOUS at 12:16

## 2021-06-25 RX ADMIN — Medication 100 MILLIGRAM(S): at 10:14

## 2021-06-25 RX ADMIN — HEPARIN SODIUM 1300 UNIT(S)/HR: 5000 INJECTION INTRAVENOUS; SUBCUTANEOUS at 00:08

## 2021-06-25 RX ADMIN — Medication 1 TABLET(S): at 10:14

## 2021-06-25 RX ADMIN — HEPARIN SODIUM 1300 UNIT(S)/HR: 5000 INJECTION INTRAVENOUS; SUBCUTANEOUS at 06:39

## 2021-06-25 RX ADMIN — Medication 1 MILLIGRAM(S): at 10:13

## 2021-06-25 RX ADMIN — Medication 81 MILLIGRAM(S): at 10:13

## 2021-06-25 NOTE — PROGRESS NOTE ADULT - SUBJECTIVE AND OBJECTIVE BOX
Subjective:    Awake, alert. Feels well. Ambulating with out issues.    MEDICATIONS  (STANDING):  amLODIPine   Tablet 5 milliGRAM(s) Oral daily  aspirin enteric coated 81 milliGRAM(s) Oral daily  atorvastatin 80 milliGRAM(s) Oral at bedtime  folic acid 1 milliGRAM(s) Oral daily  heparin  Infusion. 1300 Unit(s)/Hr (13 mL/Hr) IV Continuous <Continuous>  losartan 100 milliGRAM(s) Oral daily  metoprolol tartrate 100 milliGRAM(s) Oral every 12 hours  multivitamin 1 Tablet(s) Oral daily  sodium chloride 0.9%. 1000 milliLiter(s) (75 mL/Hr) IV Continuous <Continuous>  thiamine 100 milliGRAM(s) Oral daily    MEDICATIONS  (PRN):  acetaminophen   Tablet .. 650 milliGRAM(s) Oral every 4 hours PRN Temp greater or equal to 38C (100.4F)  heparin   Injectable 6500 Unit(s) IV Push every 6 hours PRN For aPTT less than 40  heparin   Injectable 3000 Unit(s) IV Push every 6 hours PRN For aPTT between 40 - 57  zolpidem 5 milliGRAM(s) Oral at bedtime PRN Insomnia      Allergies    No Known Allergies    Intolerances        Vital Signs Last 24 Hrs  T(C): 36.8 (25 Jun 2021 00:24), Max: 36.8 (25 Jun 2021 00:24)  T(F): 98.3 (25 Jun 2021 00:24), Max: 98.3 (25 Jun 2021 00:24)  HR: 58 (25 Jun 2021 00:24) (55 - 61)  BP: 124/66 (25 Jun 2021 00:24) (114/67 - 133/67)  BP(mean): --  RR: 18 (24 Jun 2021 16:10) (13 - 19)  SpO2: 97% (25 Jun 2021 00:24) (96% - 100%)    PHYSICAL EXAMINATION:    NECK:  Supple. No lymphadenopathy. Jugular venous pressure not elevated.   HEART:   The cardiac impulse has a normal quality. Reg., Nl S1 and S2. Prosthetic valve appreciated.   CHEST:  Chest is clear to auscultation. Normal respiratory effort.  ABDOMEN:  Soft and nontender.   EXTREMITIES:  There is no edema.       LABS:                        11.2   3.06  )-----------( 89       ( 25 Jun 2021 05:48 )             32.3     06-24    137  |  106  |  11  ----------------------------<  104<H>  4.1   |  26  |  1.05    Ca    8.4<L>      24 Jun 2021 08:08  Phos  2.3     06-24  Mg     2.0     06-24    TPro  6.9  /  Alb  3.2<L>  /  TBili  0.4  /  DBili  x   /  AST  49<H>  /  ALT  50  /  AlkPhos  65  06-24    PT/INR - ( 24 Jun 2021 08:08 )   PT: 19.8 sec;   INR: 1.75 ratio         PTT - ( 25 Jun 2021 05:48 )  PTT:98.4 sec      RADIOLOGY & ADDITIONAL TESTS:    Assessment and Recommendation:   · Assessment	  Antibiotics D/C'ed as per ID; cultures and serologies -Neg.  Follow up on blood cultures.       Pancytopenia- Bone Marrow Bx done on 6/24. Will check results as outpatient    Echocardiogram-noted. No evidence for endocarditis     On heparin, given subtherapeutic INR-consider D/C w/ Lovenox/coumadin.     Problem Selector:  Problem/Recommendation - 1:  Febrile illness, acute.  Problem/Recommendation - 2:  ·  Aortic valve replaced.   Problem/Recommendation - 3:  ·  Leukopenia.   Problem/Recommendation - 4:  ·  Thrombocytopenia.   Problem/Recommendation - 5:  ·  CAD (coronary artery disease).   Problem/Recommendation - 6:  Ureteral stricture.  Problem/Recommendation - 7:  HTN (hypertension).  Problem/Recommendation - 8:  PAT (paroxysmal atrial tachycardia).

## 2021-06-25 NOTE — DISCHARGE NOTE PROVIDER - NSDCMRMEDTOKEN_GEN_ALL_CORE_FT
acetaminophen 325 mg oral tablet: 2 tab(s) orally once a day (at bedtime), As Needed - for fever  amLODIPine 5 mg oral tablet: 1 tab(s) orally once a day  Aspirin Enteric Coated 81 mg oral delayed release tablet: 1 tab(s) orally once a day  atorvastatin 80 mg oral tablet: 1 tab(s) orally once a day (at bedtime)  Fiber Lax 625 mg oral tablet: 1 tab(s) orally 3 times a day  losartan 100 mg oral tablet: 1 tab(s) orally once a day  Metoprolol Tartrate 100 mg oral tablet: 1 tab(s) orally every 12 hours  Multiple Vitamins oral tablet: 1 tab(s) orally once a day  Repatha SureClick 140 mg/mL subcutaneous solution: Inject every 2 weeks  warfarin 4 mg oral tablet: 2 tab(s) orally once a day (at bedtime)  zolpidem 5 mg oral tablet: 1 tab(s) orally once a day (at bedtime), As needed, Insomnia   acetaminophen 325 mg oral tablet: 2 tab(s) orally once a day (at bedtime), As Needed - for fever  amLODIPine 5 mg oral tablet: 1 tab(s) orally once a day  Aspirin Enteric Coated 81 mg oral delayed release tablet: 1 tab(s) orally once a day  atorvastatin 80 mg oral tablet: 1 tab(s) orally once a day (at bedtime)  enoxaparin 40 mg/0.4 mL injectable solution: 40 milligram(s) subcutaneously once a day   Fiber Lax 625 mg oral tablet: 1 tab(s) orally 3 times a day  losartan 100 mg oral tablet: 1 tab(s) orally once a day  Metoprolol Tartrate 100 mg oral tablet: 1 tab(s) orally every 12 hours  Multiple Vitamins oral tablet: 1 tab(s) orally once a day  Repatha SureClick 140 mg/mL subcutaneous solution: Inject every 2 weeks  warfarin 4 mg oral tablet: 2 tab(s) orally once a day (at bedtime)  zolpidem 5 mg oral tablet: 1 tab(s) orally once a day (at bedtime), As needed, Insomnia

## 2021-06-25 NOTE — PROGRESS NOTE ADULT - PROVIDER SPECIALTY LIST ADULT
Infectious Disease
Family Medicine
Hospitalist
Infectious Disease
Pulmonology
Pulmonology
Cardiology
Cardiology

## 2021-06-25 NOTE — DISCHARGE NOTE NURSING/CASE MANAGEMENT/SOCIAL WORK - PATIENT PORTAL LINK FT
You can access the FollowMyHealth Patient Portal offered by [facility] by registering at the following website: [email or web address]. By joining [employer]’s FollowMyHealth portal, you will also be able to view your health information using other applications (apps) compatible with our system.

## 2021-06-25 NOTE — DISCHARGE NOTE PROVIDER - CARE PROVIDER_API CALL
Nav Herrera)  Internal Medicine; Pulmonary Disease  [address], [place] [address]  Phone: [phone]  Fax: [phone]  Follow Up Time:

## 2021-06-25 NOTE — DISCHARGE NOTE PROVIDER - HOSPITAL COURSE
69 YO M with a PMH of Afib, CAD, Aortic Valve replacement, failed TAVR,  ED, Urinary Frequency, BPH, HTN, HLD, presented to hospital for a fever of unknown origin that started 2 days prior to presentation. Patient states he has had fevers to 102, chills and pancytopenia. Due to his extensive cardiac history, endocarditis work up was performed and was negative. Patient was seen by cardiology, infectious disease and hematology. He was given 2 doses of cefepime, CXR, CT Chest, Echocardiogram came up without any acute pathology. Patient was then given a bone marrow biopsy by interventional radiology. Patient's clinical status kept improving throughout his admission. His Coumadin was given, INR monitored, and was given heparin drip. He will need to follow up outpatient for his bone marrow results and he will need to be discharged on coumadin and lovenox for Afib     Subjective: Patient in good spirits, wants to go home, no fevers, chills, or any presenting symptoms in the last 48 hours.     PHYSICAL EXAM:  Vital Signs Last 24 Hrs  T(C): 37.1 (25 Jun 2021 09:07), Max: 37.1 (25 Jun 2021 09:07)  T(F): 98.7 (25 Jun 2021 09:07), Max: 98.7 (25 Jun 2021 09:07)  HR: 76 (25 Jun 2021 09:07) (55 - 76)  BP: 111/67 (25 Jun 2021 09:07) (111/67 - 133/67)  BP(mean): --  RR: 19 (25 Jun 2021 09:07) (13 - 19)  SpO2: 98% (25 Jun 2021 09:07) (96% - 100%)    Constitutional: Pt lying in bed, awake and alert, NAD  HEENT: EOMI, normal hearing, moist mucous membranes  Neck: Soft and supple, no JVD  Respiratory: CTABL, No wheezing, rales or rhonchi  Cardiovascular: S1S2+, RRR, Mechanical valve click.   Gastrointestinal: BS+, soft, NT/ND, no guarding, no rebound  Extremities: No peripheral edema  Vascular: 2+ peripheral pulses  Neurological: AAOx3, no focal deficits  Musculoskeletal: 5/5 strength b/l upper and lower extremities  Skin: No rashes

## 2021-06-27 LAB
CULTURE RESULTS: SIGNIFICANT CHANGE UP
CULTURE RESULTS: SIGNIFICANT CHANGE UP
SPECIMEN SOURCE: SIGNIFICANT CHANGE UP
SPECIMEN SOURCE: SIGNIFICANT CHANGE UP

## 2021-06-28 ENCOUNTER — NON-APPOINTMENT (OUTPATIENT)
Age: 71
End: 2021-06-28

## 2021-07-02 DIAGNOSIS — Z72.89 OTHER PROBLEMS RELATED TO LIFESTYLE: ICD-10-CM

## 2021-07-02 DIAGNOSIS — Z95.2 PRESENCE OF PROSTHETIC HEART VALVE: ICD-10-CM

## 2021-07-02 DIAGNOSIS — I48.21 PERMANENT ATRIAL FIBRILLATION: ICD-10-CM

## 2021-07-02 DIAGNOSIS — N40.1 BENIGN PROSTATIC HYPERPLASIA WITH LOWER URINARY TRACT SYMPTOMS: ICD-10-CM

## 2021-07-02 DIAGNOSIS — R65.10 SYSTEMIC INFLAMMATORY RESPONSE SYNDROME (SIRS) OF NON-INFECTIOUS ORIGIN WITHOUT ACUTE ORGAN DYSFUNCTION: ICD-10-CM

## 2021-07-02 DIAGNOSIS — I10 ESSENTIAL (PRIMARY) HYPERTENSION: ICD-10-CM

## 2021-07-02 DIAGNOSIS — I25.10 ATHEROSCLEROTIC HEART DISEASE OF NATIVE CORONARY ARTERY WITHOUT ANGINA PECTORIS: ICD-10-CM

## 2021-07-02 DIAGNOSIS — Z95.1 PRESENCE OF AORTOCORONARY BYPASS GRAFT: ICD-10-CM

## 2021-07-02 DIAGNOSIS — D61.818 OTHER PANCYTOPENIA: ICD-10-CM

## 2021-07-02 DIAGNOSIS — R50.9 FEVER, UNSPECIFIED: ICD-10-CM

## 2021-07-02 DIAGNOSIS — I47.1 SUPRAVENTRICULAR TACHYCARDIA: ICD-10-CM

## 2021-07-02 DIAGNOSIS — R35.0 FREQUENCY OF MICTURITION: ICD-10-CM

## 2021-07-02 DIAGNOSIS — E78.5 HYPERLIPIDEMIA, UNSPECIFIED: ICD-10-CM

## 2021-07-08 ENCOUNTER — NON-APPOINTMENT (OUTPATIENT)
Age: 71
End: 2021-07-08

## 2021-09-14 ENCOUNTER — APPOINTMENT (OUTPATIENT)
Dept: INTERNAL MEDICINE | Facility: CLINIC | Age: 71
End: 2021-09-14
Payer: MEDICARE

## 2021-09-14 VITALS
RESPIRATION RATE: 16 BRPM | TEMPERATURE: 98.1 F | BODY MASS INDEX: 26.6 KG/M2 | HEIGHT: 71 IN | DIASTOLIC BLOOD PRESSURE: 90 MMHG | WEIGHT: 190 LBS | HEART RATE: 62 BPM | SYSTOLIC BLOOD PRESSURE: 150 MMHG | OXYGEN SATURATION: 97 %

## 2021-09-14 PROCEDURE — G0439: CPT

## 2021-09-14 NOTE — PHYSICAL EXAM
[General Appearance - Alert] : alert [General Appearance - In No Acute Distress] : in no acute distress [General Appearance - Well Developed] : well developed [General Appearance - Well-Appearing] : healthy appearing [Sclera] : the sclera and conjunctiva were normal [PERRL With Normal Accommodation] : pupils were equal in size, round, and reactive to light [Strabismus] : no strabismus was seen [Outer Ear] : the ears and nose were normal in appearance [Hearing Threshold Finger Rub Not Butts] : hearing was normal [Examination Of The Oral Cavity] : the lips and gums were normal [Oropharynx] : the oropharynx was normal [Neck Appearance] : the appearance of the neck was normal [Neck Cervical Mass (___cm)] : no neck mass was observed [Jugular Venous Distention Increased] : there was no jugular-venous distention [Thyroid Diffuse Enlargement] : the thyroid was not enlarged [Respiration, Rhythm And Depth] : normal respiratory rhythm and effort [Exaggerated Use Of Accessory Muscles For Inspiration] : no accessory muscle use [Auscultation Breath Sounds / Voice Sounds] : lungs were clear to auscultation bilaterally [Heart Rate And Rhythm] : heart rate was normal and rhythm regular [Heart Sounds Gallop] : no gallops [Heart Sounds Pericardial Friction Rub] : no pericardial rub [Systolic grade ___/6] : A grade [unfilled]/6 systolic murmur was heard. [FreeTextEntry1] : Mechanical S1 and S2 [Edema] : there was no peripheral edema [Veins - Varicosity Changes] : there were no varicosital changes [Abdomen Soft] : soft [Abdomen Mass (___ Cm)] : no abdominal mass palpated [Cervical Lymph Nodes Enlarged Anterior Bilaterally] : anterior cervical [Supraclavicular Lymph Nodes Enlarged Bilaterally] : supraclavicular [Abnormal Walk] : normal gait [Nail Clubbing] : no clubbing  or cyanosis of the fingernails [Musculoskeletal - Swelling] : no joint swelling seen [Skin Color & Pigmentation] : normal skin color and pigmentation [Skin Turgor] : normal skin turgor [] : no rash [No Focal Deficits] : no focal deficits [Impaired Insight] : insight and judgment were intact [Oriented To Time, Place, And Person] : oriented to person, place, and time [Affect] : the affect was normal

## 2021-09-14 NOTE — PLAN
[FreeTextEntry1] : 1. Continued medication as outlined above.\par \par 2. The patient will contact his gastroenterologist, Dr. Snyder regarding his followup surveillance colonoscopy\par \par 3. The patient will consider the third vaccination for the corona virus in the near future when the final recommendations are made. Flu shot in the next several weeks.\par \par 4. Urology followup with Dr. Nelson\par \par 5. Followup in 6 months with office visit with followup CBC to reevaluate the leukopenia.

## 2021-09-14 NOTE — HISTORY OF PRESENT ILLNESS
[FreeTextEntry1] : The patient comes in for a yearly wellness evaluation\par  [de-identified] : The patient comes in, stating that he is doing well at this time. The patient was hospitalized at [facility] for a febrile illness back in June. He was noted to be pancytopenic. He did undergo a bone marrow biopsy which was negative. He was told by his hematologist, Dr. Perez, that he did not need any further followup he is basically back to his baseline and feeling well at this time. He has had issues with mild leukopenia and thrombocytopenia in the past.\par \par The patient did followup with his cardiologist, Dr. Monteiro. He underwent a 24-hour blood pressure monitor and apparently his blood pressure was noted to be in acceptable range during that study. He is compliant with his medications. He continues to walk on a regular basis. She remains active performing work around his house. He denies any change in bowel habits. He has not had a colonoscopy since 2014, but he did undergo a Cologuard analysis in 2019. He now comes in for assessment.\par \par

## 2021-11-03 ENCOUNTER — RX RENEWAL (OUTPATIENT)
Age: 71
End: 2021-11-03

## 2021-11-03 RX ORDER — METOPROLOL TARTRATE 100 MG/1
100 TABLET, FILM COATED ORAL
Qty: 180 | Refills: 1 | Status: ACTIVE | COMMUNITY
Start: 2017-04-24 | End: 1900-01-01

## 2021-11-11 ENCOUNTER — APPOINTMENT (OUTPATIENT)
Dept: GASTROENTEROLOGY | Facility: CLINIC | Age: 71
End: 2021-11-11
Payer: MEDICARE

## 2021-11-11 VITALS
BODY MASS INDEX: 26.74 KG/M2 | SYSTOLIC BLOOD PRESSURE: 132 MMHG | WEIGHT: 191 LBS | HEIGHT: 71 IN | HEART RATE: 101 BPM | DIASTOLIC BLOOD PRESSURE: 78 MMHG

## 2021-11-11 DIAGNOSIS — Z12.11 ENCOUNTER FOR SCREENING FOR MALIGNANT NEOPLASM OF COLON: ICD-10-CM

## 2021-11-11 PROCEDURE — 99213 OFFICE O/P EST LOW 20 MIN: CPT

## 2021-11-11 NOTE — HISTORY OF PRESENT ILLNESS
[de-identified] : Mr. CHERI PAYNE is a 71 year old male presents for screening colonoscopy. Patient has no complaints of bowel issues, bleeding, abdominal pain, family history of colon cancer, GERD symptoms. Patient had last colonoscopy in 2014. Patient has history of mechanical aortic valve and is taking Coumadin. No other significant changes in medical history. \par \par

## 2022-01-01 NOTE — ED ADULT NURSE NOTE - FINAL NURSING ELECTRONIC SIGNATURE
[Care Plan reviewed and provided to patient/caregiver] : Care plan reviewed and provided to patient/caregiver 21-Jun-2021 23:40

## 2022-01-05 ENCOUNTER — OUTPATIENT (OUTPATIENT)
Dept: OUTPATIENT SERVICES | Facility: HOSPITAL | Age: 72
LOS: 1 days | Discharge: HOME | End: 2022-01-05
Payer: MEDICARE

## 2022-01-05 ENCOUNTER — RESULT REVIEW (OUTPATIENT)
Age: 72
End: 2022-01-05

## 2022-01-05 ENCOUNTER — APPOINTMENT (OUTPATIENT)
Dept: GASTROENTEROLOGY | Facility: HOSPITAL | Age: 72
End: 2022-01-05
Payer: MEDICARE

## 2022-01-05 VITALS
TEMPERATURE: 98 F | OXYGEN SATURATION: 99 % | SYSTOLIC BLOOD PRESSURE: 140 MMHG | DIASTOLIC BLOOD PRESSURE: 86 MMHG | WEIGHT: 190.04 LBS | RESPIRATION RATE: 19 BRPM | HEIGHT: 71 IN | HEART RATE: 65 BPM

## 2022-01-05 DIAGNOSIS — Z95.1 PRESENCE OF AORTOCORONARY BYPASS GRAFT: Chronic | ICD-10-CM

## 2022-01-05 DIAGNOSIS — Z95.2 PRESENCE OF PROSTHETIC HEART VALVE: Chronic | ICD-10-CM

## 2022-01-05 DIAGNOSIS — Z98.890 OTHER SPECIFIED POSTPROCEDURAL STATES: Chronic | ICD-10-CM

## 2022-01-05 DIAGNOSIS — Z12.11 ENCOUNTER FOR SCREENING FOR MALIGNANT NEOPLASM OF COLON: ICD-10-CM

## 2022-01-05 PROCEDURE — 88305 TISSUE EXAM BY PATHOLOGIST: CPT | Mod: 26

## 2022-01-05 PROCEDURE — 45380 COLONOSCOPY AND BIOPSY: CPT | Mod: PT

## 2022-01-05 PROCEDURE — 88305 TISSUE EXAM BY PATHOLOGIST: CPT

## 2022-01-05 PROCEDURE — 45380 COLONOSCOPY AND BIOPSY: CPT

## 2022-01-05 NOTE — ASU PATIENT PROFILE, ADULT - NSICDXPASTSURGICALHX_GEN_ALL_CORE_FT
PAST SURGICAL HISTORY:  Aortic valve replaced st judes value    H/O coronary artery bypass surgery     H/O cystoscopy x2    H/O inguinal hernia repair

## 2022-01-05 NOTE — ASU PATIENT PROFILE, ADULT - NSICDXPASTMEDICALHX_GEN_ALL_CORE_FT
PAST MEDICAL HISTORY:  BPH (benign prostatic hyperplasia)     CAD (coronary artery disease)     ED (erectile dysfunction)     Frequency of urination     H/O hyperlipidemia     H/O urethral stricture     HTN (hypertension)     PAT (paroxysmal atrial tachycardia)     Ureteral stricture

## 2022-01-05 NOTE — ASU PATIENT PROFILE, ADULT - FALL HARM RISK - UNIVERSAL INTERVENTIONS
Bed in lowest position, wheels locked, appropriate side rails in place/Call bell, personal items and telephone in reach/Instruct patient to call for assistance before getting out of bed or chair/Non-slip footwear when patient is out of bed/[place] to call system/Physically safe environment - no spills, clutter or unnecessary equipment/Purposeful Proactive Rounding/Room/bathroom lighting operational, light cord in reach

## 2022-01-05 NOTE — ASU PREOP CHECKLIST - ORDERS/MEDICATION ADMINISTRATION RECORD ON CHART
PRE-OP DIAGNOSIS:  Achilles rupture, left 17-Mar-2019 16:08:22  Nikky Rios  Rupture of left Achilles tendon 17-Mar-2019 16:07:47  Nikky Rios done

## 2022-01-10 DIAGNOSIS — Z12.11 ENCOUNTER FOR SCREENING FOR MALIGNANT NEOPLASM OF COLON: ICD-10-CM

## 2022-01-10 DIAGNOSIS — J45.909 UNSPECIFIED ASTHMA, UNCOMPLICATED: ICD-10-CM

## 2022-01-10 DIAGNOSIS — E78.5 HYPERLIPIDEMIA, UNSPECIFIED: ICD-10-CM

## 2022-01-10 DIAGNOSIS — I48.0 PAROXYSMAL ATRIAL FIBRILLATION: ICD-10-CM

## 2022-01-10 DIAGNOSIS — Z87.891 PERSONAL HISTORY OF NICOTINE DEPENDENCE: ICD-10-CM

## 2022-01-10 DIAGNOSIS — Z79.01 LONG TERM (CURRENT) USE OF ANTICOAGULANTS: ICD-10-CM

## 2022-01-10 DIAGNOSIS — Z79.82 LONG TERM (CURRENT) USE OF ASPIRIN: ICD-10-CM

## 2022-01-10 DIAGNOSIS — I25.10 ATHEROSCLEROTIC HEART DISEASE OF NATIVE CORONARY ARTERY WITHOUT ANGINA PECTORIS: ICD-10-CM

## 2022-01-10 DIAGNOSIS — Z95.2 PRESENCE OF PROSTHETIC HEART VALVE: ICD-10-CM

## 2022-01-10 DIAGNOSIS — N40.0 BENIGN PROSTATIC HYPERPLASIA WITHOUT LOWER URINARY TRACT SYMPTOMS: ICD-10-CM

## 2022-01-10 DIAGNOSIS — K57.30 DIVERTICULOSIS OF LARGE INTESTINE WITHOUT PERFORATION OR ABSCESS WITHOUT BLEEDING: ICD-10-CM

## 2022-01-10 DIAGNOSIS — K64.9 UNSPECIFIED HEMORRHOIDS: ICD-10-CM

## 2022-01-10 DIAGNOSIS — K63.5 POLYP OF COLON: ICD-10-CM

## 2022-01-10 DIAGNOSIS — I10 ESSENTIAL (PRIMARY) HYPERTENSION: ICD-10-CM

## 2022-01-10 DIAGNOSIS — Z95.1 PRESENCE OF AORTOCORONARY BYPASS GRAFT: ICD-10-CM

## 2022-04-01 ENCOUNTER — APPOINTMENT (OUTPATIENT)
Dept: INTERNAL MEDICINE | Facility: CLINIC | Age: 72
End: 2022-04-01
Payer: MEDICARE

## 2022-04-01 VITALS
SYSTOLIC BLOOD PRESSURE: 130 MMHG | OXYGEN SATURATION: 97 % | DIASTOLIC BLOOD PRESSURE: 72 MMHG | HEIGHT: 71 IN | RESPIRATION RATE: 16 BRPM | HEART RATE: 82 BPM | WEIGHT: 189 LBS | BODY MASS INDEX: 26.46 KG/M2 | TEMPERATURE: 98.2 F

## 2022-04-01 VITALS — DIASTOLIC BLOOD PRESSURE: 78 MMHG | SYSTOLIC BLOOD PRESSURE: 126 MMHG

## 2022-04-01 DIAGNOSIS — F41.9 ANXIETY DISORDER, UNSPECIFIED: ICD-10-CM

## 2022-04-01 DIAGNOSIS — R42 DIZZINESS AND GIDDINESS: ICD-10-CM

## 2022-04-01 DIAGNOSIS — M54.9 DORSALGIA, UNSPECIFIED: ICD-10-CM

## 2022-04-01 PROCEDURE — 99214 OFFICE O/P EST MOD 30 MIN: CPT

## 2022-04-01 RX ORDER — ENOXAPARIN SODIUM 100 MG/ML
40 INJECTION SUBCUTANEOUS DAILY
Qty: 8 | Refills: 2 | Status: DISCONTINUED | COMMUNITY
Start: 2021-11-11 | End: 2022-04-01

## 2022-04-01 RX ORDER — POLYETHYLENE GLYCOL-3350, SODIUM CHLORIDE, POTASSIUM CHLORIDE AND SODIUM BICARBONATE 420; 11.2; 5.72; 1.48 G/438.4G; G/438.4G; G/438.4G; G/438.4G
420 POWDER, FOR SOLUTION ORAL
Qty: 1 | Refills: 0 | Status: DISCONTINUED | COMMUNITY
Start: 2021-11-11 | End: 2022-04-01

## 2022-04-01 RX ORDER — LOSARTAN POTASSIUM 100 MG/1
100 TABLET, FILM COATED ORAL DAILY
Refills: 0 | Status: ACTIVE | COMMUNITY

## 2022-04-01 NOTE — HISTORY OF PRESENT ILLNESS
[FreeTextEntry1] : The patient comes in today for a routine followup evaluation.\par  [de-identified] : The patient states that overall, he is doing fairly well. He was seen by his cardiologist, in February, and he was felt to be doing well at that time. The patient has still been walking 3 miles a day. His weight has been stable. He has noted, however, that when he exerts himself such as walking up an incline, or after walking up 2-3 flights of stairs, that he develops slight lightheadedness. He states that he is not having any chest pain. He denies vertiginous type symptoms. He needs to stop and rest in order to get his wind, before he can continue.\par \par The patient is having some discomfort in his left shoulder. He has had a prior injury to that joint. Recently, he has had a slight increase in his baseline degree of anxiety which he attributes to some family issues with his sister who has been diagnosed with cancer. He denies any change in bowel habits. His appetite is good. He states that he remains very well hydrated. He denies any other acute constitutional symptoms at this time. The patient continues to self catheterize. He now comes in for assessment.

## 2022-04-01 NOTE — PLAN
[FreeTextEntry1] : 1. Continued medications as outlined above.\par \par 2. The patient will obtain the second booster for the corona virus\par \par 3. We'll now ordered tizanidine 2 mg to take on an as-needed basis for his muscle spasm in his shoulder.\par \par 4. I've asked the patient to return to his cardiologist, Dr. Monteiro, for an evaluation of his symptoms of lightheadedness. I have a concern as to whether or not he is having difficulty raising his pulse due to the moderate dose of the beta blocker, namely metoprolol 100 mg b.i.d. This may be preventing him from increasing his pulse during increased exercise activity, which may be contributing to his symptoms of lightheadedness. Also possibly need to rule out an obstructive coronary lesion. I will await the cardiology recommendations in this regard.\par \par 5. Consider neurological consultation and reassessment with Dr. Messina if the cardiac assessment is not helpful.\par \par 6. Follow up with myself in 6 months with a wellness evaluation and yearly routine fasting blood work.

## 2022-04-07 NOTE — DISCHARGE NOTE PROVIDER - NSDCCPCAREPLAN_GEN_ALL_CORE_FT
Pre-Operative Diagnosis: Primary osteoarthritis of left hip [M16.12]     Post-Operative Diagnosis: Primary osteoarthritis of left hip [M16.12]      Procedure Performed:   LEFT TOTAL HIP ARTHROPLASTY, ANTERIOR APPROACH    Surgeon(s) and Role:     * Marita Boyd MD - Primary    Assistant(s):  Surgical Assistant.: Isela Simpson  PA: Willian Edwards PA-C     Surgical Findings: OA     Specimen: bone     Estimated Blood Loss: Blood Output: 150 mL (4/7/2022  8:47 AM)      Dictation Number:  Lornea Kim MD  4/7/2022  9:02 AM PRINCIPAL DISCHARGE DIAGNOSIS  Diagnosis: Fever of unknown origin  Assessment and Plan of Treatment: During this admission you were given an extensive workup for fevers and chills, which included consults from infection disease, blood work for viral, bacterial and tick bourne illness, cardiology for endocarditis, and hematology for blood disorders. You were given a bone marrow biopsy. Please follow up with your primary care provider, Dr. Herrera for continued management.      SECONDARY DISCHARGE DIAGNOSES  Diagnosis: Permanent atrial fibrillation  Assessment and Plan of Treatment: For your atrial fibrillation, you were given heparin drip and you were given home doses of coumadin. which were approprately given and held as you went for your tests. You will need to follow up outpatient for continued management and will need to be discharged on Lovenox and Coumadin checking your INR approprately.

## 2022-06-08 ENCOUNTER — APPOINTMENT (OUTPATIENT)
Dept: INTERNAL MEDICINE | Facility: CLINIC | Age: 72
End: 2022-06-08
Payer: MEDICARE

## 2022-06-08 VITALS
OXYGEN SATURATION: 98 % | RESPIRATION RATE: 16 BRPM | BODY MASS INDEX: 27.99 KG/M2 | WEIGHT: 189 LBS | SYSTOLIC BLOOD PRESSURE: 145 MMHG | HEART RATE: 69 BPM | TEMPERATURE: 98 F | HEIGHT: 69 IN | DIASTOLIC BLOOD PRESSURE: 79 MMHG

## 2022-06-08 PROCEDURE — 94060 EVALUATION OF WHEEZING: CPT

## 2022-06-08 PROCEDURE — 94729 DIFFUSING CAPACITY: CPT

## 2022-06-08 PROCEDURE — 94727 GAS DIL/WSHOT DETER LNG VOL: CPT

## 2022-06-27 ENCOUNTER — NON-APPOINTMENT (OUTPATIENT)
Age: 72
End: 2022-06-27

## 2022-06-27 DIAGNOSIS — Z82.49 FAMILY HISTORY OF ISCHEMIC HEART DISEASE AND OTHER DISEASES OF THE CIRCULATORY SYSTEM: ICD-10-CM

## 2022-06-27 RX ORDER — ALPRAZOLAM 0.5 MG/1
0.5 TABLET ORAL TWICE DAILY
Qty: 20 | Refills: 0 | Status: DISCONTINUED | COMMUNITY
Start: 2022-04-01 | End: 2022-06-27

## 2022-06-27 RX ORDER — WARFARIN 5 MG/1
5 TABLET ORAL
Refills: 0 | Status: ACTIVE | COMMUNITY

## 2022-06-27 RX ORDER — TIZANIDINE 2 MG/1
2 TABLET ORAL EVERY 8 HOURS
Qty: 50 | Refills: 1 | Status: DISCONTINUED | COMMUNITY
Start: 2022-04-01 | End: 2022-06-27

## 2022-06-27 RX ORDER — WARFARIN 3 MG/1
3 TABLET ORAL
Refills: 0 | Status: ACTIVE | COMMUNITY
Start: 2022-06-27

## 2022-06-28 ENCOUNTER — NON-APPOINTMENT (OUTPATIENT)
Age: 72
End: 2022-06-28

## 2022-06-28 ENCOUNTER — APPOINTMENT (OUTPATIENT)
Dept: ELECTROPHYSIOLOGY | Facility: CLINIC | Age: 72
End: 2022-06-28

## 2022-06-28 VITALS
OXYGEN SATURATION: 100 % | HEIGHT: 69 IN | BODY MASS INDEX: 27.99 KG/M2 | HEART RATE: 61 BPM | WEIGHT: 189 LBS | SYSTOLIC BLOOD PRESSURE: 132 MMHG | DIASTOLIC BLOOD PRESSURE: 82 MMHG

## 2022-06-28 DIAGNOSIS — R93.89 ABNORMAL FINDINGS ON DIAGNOSTIC IMAGING OF OTHER SPECIFIED BODY STRUCTURES: ICD-10-CM

## 2022-06-28 PROCEDURE — 99205 OFFICE O/P NEW HI 60 MIN: CPT

## 2022-06-28 PROCEDURE — 93000 ELECTROCARDIOGRAM COMPLETE: CPT

## 2022-06-30 ENCOUNTER — NON-APPOINTMENT (OUTPATIENT)
Age: 72
End: 2022-06-30

## 2022-07-01 ENCOUNTER — NON-APPOINTMENT (OUTPATIENT)
Age: 72
End: 2022-07-01

## 2022-07-01 PROBLEM — R93.89 ABNORMAL CHEST MRI: Status: ACTIVE | Noted: 2022-07-01

## 2022-07-01 NOTE — CARDIOLOGY SUMMARY
[de-identified] : 6/28/22 : Sinus Rhythm  61 WITHIN NORMAL LIMITS [de-identified] : 6/18/20 : TTE : Left ventricle is normal in size, there is mild concentric left ventricular hypertrophy.  The LV systolic function is normal.  Transmitral spectral Doppler pattern suggestive of impaired LV relaxation.  There is mild mitral regurgitation.  There is trace tricuspid regurgitation.  Right ventricular systolic pressure is normal.  There is mechanical aortic valve.  The gradient is normal for this prosthetic aortic valve.  The Doppler suggests a periprosthetic leak of the prosthetic aortic valve.  This is trace in severity.  The study was technically adequate. [de-identified] : 6/9/22 : MRI left ventricle is normal in size with normal systolic function.  Right ventricle size is normal with normal systolic function.  There is a mechanical valve in the aortic position.  Myocardial T1 relaxation time is within normal limits which suggest there is normal myocardial composition.  The myocardial T2 relaxation time is within normal limits which suggests the absence of myocardial edema or inflammation.  On late gadolinium enhancement images there is focal mid wall hyperenhancement in the basal inferior inferolateral and anterolateral segments and a non-CAD pattern.

## 2022-07-01 NOTE — HISTORY OF PRESENT ILLNESS
[FreeTextEntry1] : This is a 71-year-old man who presents for evaluation of nonsustained ventricular tachycardia, and an abnormal cardiac MRI.  He has  HTN, HLD, CAD s/p CABG x3 in 2006, s/p TAVR 9/2016 failed, with subsequent prosthetic AVR i 1/3/2017 by Dr Prakash in [facility]. He has a history of thrombocytopenia. CHERI PAYNE  denies chest pain, chest pressure, shortness of breath, lightheadedness, dizziness, palpitations, syncope, presyncope, orthopnea, PND, or edema.

## 2022-07-01 NOTE — ASSESSMENT
[FreeTextEntry1] : This is a 71-year-old man who presents for evaluation of nonsustained ventricular tachycardia, and an abnormal cardiac MRI.  He has a history of thrombocytopenia. \par \par The differential diagnosis of his abnormal cardiac MRI includes sarcoidosis, hemochromatosis, amyloidosis, focal scarring from past myocarditis.  He has had no extracardiac suggestion of either sarcoidosis or hemochromatosis, but has noted has had thrombocytopenia in the past raising the concern for AA or AL amyloidosis. \par \par Will obtain the appropriate initial blood work for amyloidosis and in addition schedule an FDG PET/CT to rule out inflammation secondary to sarcoidosis.

## 2022-07-15 ENCOUNTER — NON-APPOINTMENT (OUTPATIENT)
Age: 72
End: 2022-07-15

## 2022-08-11 ENCOUNTER — TRANSCRIPTION ENCOUNTER (OUTPATIENT)
Age: 72
End: 2022-08-11

## 2022-08-11 ENCOUNTER — APPOINTMENT (OUTPATIENT)
Dept: ELECTROPHYSIOLOGY | Facility: CLINIC | Age: 72
End: 2022-08-11

## 2022-08-11 VITALS
HEART RATE: 65 BPM | DIASTOLIC BLOOD PRESSURE: 80 MMHG | OXYGEN SATURATION: 99 % | SYSTOLIC BLOOD PRESSURE: 137 MMHG | HEIGHT: 69 IN | BODY MASS INDEX: 28.14 KG/M2 | WEIGHT: 190 LBS

## 2022-08-11 DIAGNOSIS — I47.2 VENTRICULAR TACHYCARDIA: ICD-10-CM

## 2022-08-11 PROCEDURE — 93000 ELECTROCARDIOGRAM COMPLETE: CPT

## 2022-08-11 PROCEDURE — 99214 OFFICE O/P EST MOD 30 MIN: CPT

## 2022-09-08 PROBLEM — I47.2 NSVT (NONSUSTAINED VENTRICULAR TACHYCARDIA): Status: ACTIVE | Noted: 2022-06-28

## 2022-09-08 NOTE — HISTORY OF PRESENT ILLNESS
[FreeTextEntry1] : This is a 72-year-old man who presents for evaluation of nonsustained ventricular tachycardia, and an abnormal cardiac MRI.  He has  HTN, HLD, CAD s/p CABG x3 in 2006, s/p TAVR 9/2016 failed, with subsequent prosthetic AVR i 1/3/2017 by Dr Prakash in [facility]. He has a history of thrombocytopenia. CHERI PAYNE  denies chest pain, chest pressure, shortness of breath, lightheadedness, dizziness, palpitations, syncope, presyncope, orthopnea, PND, or edema.

## 2022-09-08 NOTE — CARDIOLOGY SUMMARY
[de-identified] : 6/28/22 : Sinus Rhythm  61 WITHIN NORMAL LIMITS [de-identified] : 6/18/20 : TTE : Left ventricle is normal in size, there is mild concentric left ventricular hypertrophy.  The LV systolic function is normal.  Transmitral spectral Doppler pattern suggestive of impaired LV relaxation.  There is mild mitral regurgitation.  There is trace tricuspid regurgitation.  Right ventricular systolic pressure is normal.  There is mechanical aortic valve.  The gradient is normal for this prosthetic aortic valve.  The Doppler suggests a periprosthetic leak of the prosthetic aortic valve.  This is trace in severity.  The study was technically adequate. [de-identified] : 6/9/22 : MRI left ventricle is normal in size with normal systolic function.  Right ventricle size is normal with normal systolic function.  There is a mechanical valve in the aortic position.  Myocardial T1 relaxation time is within normal limits which suggest there is normal myocardial composition.  The myocardial T2 relaxation time is within normal limits which suggests the absence of myocardial edema or inflammation.  On late gadolinium enhancement images there is focal mid wall hyperenhancement in the basal inferior inferolateral and anterolateral segments and a non-CAD pattern.

## 2022-09-08 NOTE — ASSESSMENT
[FreeTextEntry1] : This is a 71-year-old man who presents for evaluation of nonsustained ventricular tachycardia, and an abnormal cardiac MRI.  He has a history of thrombocytopenia. \par \par The differential diagnosis of his abnormal cardiac MRI includes sarcoidosis, hemochromatosis, amyloidosis, focal scarring from past myocarditis.  He has had no extracardiac suggestion of either sarcoidosis or hemochromatosis, but has noted has had thrombocytopenia in the past raising the concern for AA or AL amyloidosis. \par Lab results elevated free Lamada light chain. He is being evaluated by Dr. Perez ( Hematology for amyloidosis)  \par \par Awaitng PET scan. \par

## 2022-09-30 ENCOUNTER — APPOINTMENT (OUTPATIENT)
Dept: NUCLEAR MEDICINE | Facility: IMAGING CENTER | Age: 72
End: 2022-09-30
Payer: MEDICARE

## 2022-09-30 ENCOUNTER — OUTPATIENT (OUTPATIENT)
Dept: OUTPATIENT SERVICES | Facility: HOSPITAL | Age: 72
LOS: 1 days | End: 2022-09-30
Payer: MEDICARE

## 2022-09-30 ENCOUNTER — RESULT REVIEW (OUTPATIENT)
Age: 72
End: 2022-09-30

## 2022-09-30 DIAGNOSIS — Z98.890 OTHER SPECIFIED POSTPROCEDURAL STATES: Chronic | ICD-10-CM

## 2022-09-30 DIAGNOSIS — I47.2 VENTRICULAR TACHYCARDIA: ICD-10-CM

## 2022-09-30 DIAGNOSIS — Z95.1 PRESENCE OF AORTOCORONARY BYPASS GRAFT: Chronic | ICD-10-CM

## 2022-09-30 DIAGNOSIS — Z95.2 PRESENCE OF PROSTHETIC HEART VALVE: Chronic | ICD-10-CM

## 2022-09-30 PROCEDURE — 78815 PET IMAGE W/CT SKULL-THIGH: CPT | Mod: 26,MH

## 2022-09-30 PROCEDURE — 78815 PET IMAGE W/CT SKULL-THIGH: CPT | Mod: MH,PO

## 2022-09-30 PROCEDURE — A9500: CPT

## 2022-09-30 PROCEDURE — 78451 HT MUSCLE IMAGE SPECT SING: CPT

## 2022-09-30 PROCEDURE — 78451 HT MUSCLE IMAGE SPECT SING: CPT | Mod: 26,MH

## 2022-09-30 PROCEDURE — 78429 MYOCRD IMG PET 1 STD W/CT: CPT | Mod: MH

## 2022-09-30 PROCEDURE — A9552: CPT | Mod: XU

## 2022-10-06 DIAGNOSIS — E87.5 HYPERKALEMIA: ICD-10-CM

## 2022-10-12 LAB
ANION GAP SERPL CALC-SCNC: 11 MMOL/L
BUN SERPL-MCNC: 16 MG/DL
CALCIUM SERPL-MCNC: 9.4 MG/DL
CHLORIDE SERPL-SCNC: 100 MMOL/L
CO2 SERPL-SCNC: 25 MMOL/L
CREAT SERPL-MCNC: 1.4 MG/DL
EGFR: 53 ML/MIN/1.73M2
GLUCOSE SERPL-MCNC: 135 MG/DL
POTASSIUM SERPL-SCNC: 4.5 MMOL/L
SODIUM SERPL-SCNC: 136 MMOL/L

## 2022-10-13 ENCOUNTER — RESULT REVIEW (OUTPATIENT)
Age: 72
End: 2022-10-13

## 2022-10-13 ENCOUNTER — OUTPATIENT (OUTPATIENT)
Dept: OUTPATIENT SERVICES | Facility: HOSPITAL | Age: 72
LOS: 1 days | End: 2022-10-13
Payer: SELF-PAY

## 2022-10-13 ENCOUNTER — APPOINTMENT (OUTPATIENT)
Dept: NUCLEAR MEDICINE | Facility: IMAGING CENTER | Age: 72
End: 2022-10-13

## 2022-10-13 DIAGNOSIS — I47.20 VENTRICULAR TACHYCARDIA, UNSPECIFIED: ICD-10-CM

## 2022-10-13 DIAGNOSIS — Z98.890 OTHER SPECIFIED POSTPROCEDURAL STATES: Chronic | ICD-10-CM

## 2022-10-13 DIAGNOSIS — Z95.1 PRESENCE OF AORTOCORONARY BYPASS GRAFT: Chronic | ICD-10-CM

## 2022-10-13 DIAGNOSIS — Z95.2 PRESENCE OF PROSTHETIC HEART VALVE: Chronic | ICD-10-CM

## 2022-10-13 PROCEDURE — 78429 MYOCRD IMG PET 1 STD W/CT: CPT | Mod: MH

## 2022-10-13 PROCEDURE — 78429 MYOCRD IMG PET 1 STD W/CT: CPT | Mod: 26,MH

## 2022-10-13 PROCEDURE — A9552: CPT

## 2022-10-24 ENCOUNTER — NON-APPOINTMENT (OUTPATIENT)
Age: 72
End: 2022-10-24

## 2022-10-27 ENCOUNTER — APPOINTMENT (OUTPATIENT)
Dept: INTERNAL MEDICINE | Facility: CLINIC | Age: 72
End: 2022-10-27

## 2022-10-27 VITALS
OXYGEN SATURATION: 97 % | HEIGHT: 69 IN | RESPIRATION RATE: 16 BRPM | HEART RATE: 73 BPM | TEMPERATURE: 98.1 F | WEIGHT: 190 LBS | DIASTOLIC BLOOD PRESSURE: 82 MMHG | BODY MASS INDEX: 28.14 KG/M2 | SYSTOLIC BLOOD PRESSURE: 128 MMHG

## 2022-10-27 PROCEDURE — G0439: CPT

## 2022-10-27 PROCEDURE — 99214 OFFICE O/P EST MOD 30 MIN: CPT | Mod: 25

## 2022-10-27 NOTE — DATA REVIEWED
[FreeTextEntry1] : Routine blood work is reviewed\par \par Cardiac PET/CT scan performed on 9/30/2022 is reviewed.  There is mild heterogeneous FDG activity in the left ventricle most intense in the anterior lateral wall at the base with an SUV of 4.9, and the septum with an SUV of 4.0, and in the inferior wall at the base with an SUV of 4.6.  Several FDG avid lymph nodes are noted in the mediastinum and hilar regions as well.  There is concern that this may represent sarcoidosis.  The area of distribution corresponds to the area of late gadolinium enhancement seen on the cardiac MRI.

## 2022-10-27 NOTE — HEALTH RISK ASSESSMENT
[Never] : Never [Yes] : Yes [4 or more  times a week (4 pts)] : 4 or more  times a week (4 points) [3 or 4 (1 pt)] : 3 or 4  (1 point) [Never (0 pts)] : Never (0 points) [No] : In the past 12 months have you used drugs other than those required for medical reasons? No [0] : 2) Feeling down, depressed, or hopeless: Not at all (0)

## 2022-10-27 NOTE — PHYSICAL EXAM
[General Appearance - Alert] : alert [General Appearance - In No Acute Distress] : in no acute distress [General Appearance - Well Developed] : well developed [General Appearance - Well-Appearing] : healthy appearing [Sclera] : the sclera and conjunctiva were normal [PERRL With Normal Accommodation] : pupils were equal in size, round, and reactive to light [Strabismus] : no strabismus was seen [Outer Ear] : the ears and nose were normal in appearance [Hearing Threshold Finger Rub Not [place]] : hearing was normal [Examination Of The Oral Cavity] : the lips and gums were normal [Oropharynx] : the oropharynx was normal [Neck Appearance] : the appearance of the neck was normal [Neck Cervical Mass (___cm)] : no neck mass was observed [Jugular Venous Distention Increased] : there was no jugular-venous distention [Thyroid Diffuse Enlargement] : the thyroid was not enlarged [Respiration, Rhythm And Depth] : normal respiratory rhythm and effort [Exaggerated Use Of Accessory Muscles For Inspiration] : no accessory muscle use [Auscultation Breath Sounds / Voice Sounds] : lungs were clear to auscultation bilaterally [Heart Rate And Rhythm] : heart rate was normal and rhythm regular [Heart Sounds Gallop] : no gallops [Heart Sounds Pericardial Friction Rub] : no pericardial rub [Systolic grade ___/6] : A grade [unfilled]/6 systolic murmur was heard. [FreeTextEntry1] : Mechanical S1 and S2 [Edema] : there was no peripheral edema [Veins - Varicosity Changes] : there were no varicosital changes [Abdomen Soft] : soft [Abdomen Mass (___ Cm)] : no abdominal mass palpated [Cervical Lymph Nodes Enlarged Anterior Bilaterally] : anterior cervical [Supraclavicular Lymph Nodes Enlarged Bilaterally] : supraclavicular [Abnormal Walk] : normal gait [Nail Clubbing] : no clubbing  or cyanosis of the fingernails [Musculoskeletal - Swelling] : no joint swelling seen [Skin Color & Pigmentation] : normal skin color and pigmentation [Skin Turgor] : normal skin turgor [] : no rash [No Focal Deficits] : no focal deficits [Oriented To Time, Place, And Person] : oriented to person, place, and time [Impaired Insight] : insight and judgment were intact [Affect] : the affect was normal

## 2022-10-27 NOTE — HISTORY OF PRESENT ILLNESS
[FreeTextEntry1] : The patient presents for a comprehensive assessment.  [de-identified] : The patient has been undergoing extensive cardiac work up due to a concerned for cardiac amyloidosis or sarcoid. He underwent a cardiac MRI in June 2022 and then a cardiac PET scan in September. He repeated the cardiac PET scan in October due to poor results on the first PET scan. He has not spoken to his cardiologists since the MRI and PET scans. He has an appointment to see Dr. Contreras on November 17, 2022. Denies chest pain, palpitations, or SOB.  He also underwent a hematologic evaluation by Dr. Perez, and a definitive diagnosis of amyloid was not made.\par \par He is currently taking Repatha 140 mg IM for HLD. He is also taking Losartan 100 mg daily, Metoprolol 100 mg BID and Norvasc 5 mg daily for HTN. In addition, the patient has a history of paroxysmal AFib and is maintained on Warfin 3 mg daily. \par \par The patient has a history of asthma. He is currently maintained on Symbicort 2 puffs BID, which he began in June 2022. Reports that his SOB significantly improved. He is now able to perform yard work, which he was unable to do before. Denies SOB, fever, wheezing, or night sweats. \par \par The patient has been experiencing allergies with phlegm and rhinorrhea. Reports that this is nothing new for him. Denies ithcy eyes. \par \par Reports that he becomes lightheaded when he bends over and stands back up. He tries to stay hydrated with at least a gallon of water per day. \par \par The patient has had the COVID-19 vaccines and his flu vaccine. He is inquiring about the PNA vaccine. He is UTD with shingles and tetanus vaccines. \par \par No other complaints at this time.

## 2022-10-27 NOTE — PLAN
[FreeTextEntry1] : 1. I advised him to follow up with his cardiologists, Dr. Monteiro and Dr. Contreras, to discuss his next steps regarding his extensive cardiac work up.  There is now concern regarding a possible diagnosis of sarcoidosis.  Of note is the fact, that several mediastinal lymph nodes show FDG activity.  The patient may be a candidate for endoscopic bronchial ultrasound in order to potentially make a diagnosis of sarcoid if noncaseating granulomas are not detected.  Further recommendations will be made after discussion with Moira Monteiro and Ben\par \par 2. Exercise regularly. \par \par 3. Continue medications as outlined above. \par \par 4. Continue hydrating with water. \par \par 5. Follow up in 6 months for PFT.

## 2022-10-27 NOTE — REVIEW OF SYSTEMS
[Negative] : Heme/Lymph [FreeTextEntry4] : rhinorrhea [FreeTextEntry6] : sputum [de-identified] : lightheadedness

## 2022-11-17 ENCOUNTER — APPOINTMENT (OUTPATIENT)
Dept: ELECTROPHYSIOLOGY | Facility: CLINIC | Age: 72
End: 2022-11-17

## 2022-11-17 VITALS
WEIGHT: 188.06 LBS | DIASTOLIC BLOOD PRESSURE: 83 MMHG | OXYGEN SATURATION: 97 % | SYSTOLIC BLOOD PRESSURE: 139 MMHG | HEIGHT: 69 IN | HEART RATE: 64 BPM | BODY MASS INDEX: 27.85 KG/M2 | RESPIRATION RATE: 16 BRPM

## 2022-11-17 PROCEDURE — 99215 OFFICE O/P EST HI 40 MIN: CPT

## 2022-11-17 PROCEDURE — 93000 ELECTROCARDIOGRAM COMPLETE: CPT

## 2022-11-22 ENCOUNTER — NON-APPOINTMENT (OUTPATIENT)
Age: 72
End: 2022-11-22

## 2022-11-28 ENCOUNTER — OUTPATIENT (OUTPATIENT)
Dept: OUTPATIENT SERVICES | Facility: HOSPITAL | Age: 72
LOS: 1 days | End: 2022-11-28
Payer: MEDICARE

## 2022-11-28 VITALS
HEIGHT: 69 IN | OXYGEN SATURATION: 100 % | DIASTOLIC BLOOD PRESSURE: 80 MMHG | SYSTOLIC BLOOD PRESSURE: 154 MMHG | TEMPERATURE: 98 F | RESPIRATION RATE: 16 BRPM | HEART RATE: 62 BPM | WEIGHT: 188.05 LBS

## 2022-11-28 DIAGNOSIS — Z98.890 OTHER SPECIFIED POSTPROCEDURAL STATES: Chronic | ICD-10-CM

## 2022-11-28 DIAGNOSIS — I47.29 OTHER VENTRICULAR TACHYCARDIA: ICD-10-CM

## 2022-11-28 DIAGNOSIS — Z90.89 ACQUIRED ABSENCE OF OTHER ORGANS: Chronic | ICD-10-CM

## 2022-11-28 DIAGNOSIS — Z95.2 PRESENCE OF PROSTHETIC HEART VALVE: Chronic | ICD-10-CM

## 2022-11-28 DIAGNOSIS — Z95.1 PRESENCE OF AORTOCORONARY BYPASS GRAFT: Chronic | ICD-10-CM

## 2022-11-28 LAB
ANION GAP SERPL CALC-SCNC: 5 MMOL/L — SIGNIFICANT CHANGE UP (ref 5–17)
BASOPHILS # BLD AUTO: 0.03 K/UL — SIGNIFICANT CHANGE UP (ref 0–0.2)
BASOPHILS NFR BLD AUTO: 0.8 % — SIGNIFICANT CHANGE UP (ref 0–2)
BUN SERPL-MCNC: 18 MG/DL — SIGNIFICANT CHANGE UP (ref 7–23)
CALCIUM SERPL-MCNC: 10.3 MG/DL — HIGH (ref 8.5–10.1)
CHLORIDE SERPL-SCNC: 104 MMOL/L — SIGNIFICANT CHANGE UP (ref 96–108)
CO2 SERPL-SCNC: 26 MMOL/L — SIGNIFICANT CHANGE UP (ref 22–31)
CREAT SERPL-MCNC: 1.29 MG/DL — SIGNIFICANT CHANGE UP (ref 0.5–1.3)
EGFR: 59 ML/MIN/1.73M2 — LOW
EOSINOPHIL # BLD AUTO: 0.06 K/UL — SIGNIFICANT CHANGE UP (ref 0–0.5)
EOSINOPHIL NFR BLD AUTO: 1.7 % — SIGNIFICANT CHANGE UP (ref 0–6)
GLUCOSE SERPL-MCNC: 111 MG/DL — HIGH (ref 70–99)
HCT VFR BLD CALC: 40.9 % — SIGNIFICANT CHANGE UP (ref 39–50)
HGB BLD-MCNC: 14.8 G/DL — SIGNIFICANT CHANGE UP (ref 13–17)
IMM GRANULOCYTES NFR BLD AUTO: 0.3 % — SIGNIFICANT CHANGE UP (ref 0–0.9)
INR BLD: 3.04 RATIO — HIGH (ref 0.88–1.16)
LYMPHOCYTES # BLD AUTO: 1.04 K/UL — SIGNIFICANT CHANGE UP (ref 1–3.3)
LYMPHOCYTES # BLD AUTO: 29 % — SIGNIFICANT CHANGE UP (ref 13–44)
MCHC RBC-ENTMCNC: 34.5 PG — HIGH (ref 27–34)
MCHC RBC-ENTMCNC: 36.2 GM/DL — HIGH (ref 32–36)
MCV RBC AUTO: 95.3 FL — SIGNIFICANT CHANGE UP (ref 80–100)
MONOCYTES # BLD AUTO: 0.49 K/UL — SIGNIFICANT CHANGE UP (ref 0–0.9)
MONOCYTES NFR BLD AUTO: 13.6 % — SIGNIFICANT CHANGE UP (ref 2–14)
MRSA PCR RESULT.: SIGNIFICANT CHANGE UP
NEUTROPHILS # BLD AUTO: 1.96 K/UL — SIGNIFICANT CHANGE UP (ref 1.8–7.4)
NEUTROPHILS NFR BLD AUTO: 54.6 % — SIGNIFICANT CHANGE UP (ref 43–77)
PLATELET # BLD AUTO: 161 K/UL — SIGNIFICANT CHANGE UP (ref 150–400)
POTASSIUM SERPL-MCNC: 5.2 MMOL/L — SIGNIFICANT CHANGE UP (ref 3.5–5.3)
POTASSIUM SERPL-SCNC: 5.2 MMOL/L — SIGNIFICANT CHANGE UP (ref 3.5–5.3)
PROTHROM AB SERPL-ACNC: 35.7 SEC — HIGH (ref 10.5–13.4)
RBC # BLD: 4.29 M/UL — SIGNIFICANT CHANGE UP (ref 4.2–5.8)
RBC # FLD: 13.5 % — SIGNIFICANT CHANGE UP (ref 10.3–14.5)
S AUREUS DNA NOSE QL NAA+PROBE: SIGNIFICANT CHANGE UP
SARS-COV-2 RNA SPEC QL NAA+PROBE: SIGNIFICANT CHANGE UP
SODIUM SERPL-SCNC: 135 MMOL/L — SIGNIFICANT CHANGE UP (ref 135–145)
WBC # BLD: 3.59 K/UL — LOW (ref 3.8–10.5)
WBC # FLD AUTO: 3.59 K/UL — LOW (ref 3.8–10.5)

## 2022-11-28 PROCEDURE — 93005 ELECTROCARDIOGRAM TRACING: CPT

## 2022-11-28 PROCEDURE — 86850 RBC ANTIBODY SCREEN: CPT

## 2022-11-28 PROCEDURE — U0005: CPT

## 2022-11-28 PROCEDURE — 99214 OFFICE O/P EST MOD 30 MIN: CPT | Mod: 25

## 2022-11-28 PROCEDURE — 87641 MR-STAPH DNA AMP PROBE: CPT

## 2022-11-28 PROCEDURE — 36415 COLL VENOUS BLD VENIPUNCTURE: CPT

## 2022-11-28 PROCEDURE — 80048 BASIC METABOLIC PNL TOTAL CA: CPT

## 2022-11-28 PROCEDURE — 86900 BLOOD TYPING SEROLOGIC ABO: CPT

## 2022-11-28 PROCEDURE — 85025 COMPLETE CBC W/AUTO DIFF WBC: CPT

## 2022-11-28 PROCEDURE — 86901 BLOOD TYPING SEROLOGIC RH(D): CPT

## 2022-11-28 PROCEDURE — 85610 PROTHROMBIN TIME: CPT

## 2022-11-28 PROCEDURE — 87640 STAPH A DNA AMP PROBE: CPT

## 2022-11-28 PROCEDURE — 93010 ELECTROCARDIOGRAM REPORT: CPT

## 2022-11-28 PROCEDURE — U0003: CPT

## 2022-11-28 RX ORDER — AMLODIPINE BESYLATE 2.5 MG/1
1 TABLET ORAL
Qty: 0 | Refills: 0 | DISCHARGE

## 2022-11-28 RX ORDER — ACETAMINOPHEN 500 MG
2 TABLET ORAL
Qty: 0 | Refills: 0 | DISCHARGE

## 2022-11-28 RX ORDER — CALCIUM POLYCARBOPHIL 625 MG/1
1 TABLET, FILM COATED ORAL
Qty: 0 | Refills: 0 | DISCHARGE

## 2022-11-28 RX ORDER — WARFARIN SODIUM 2.5 MG/1
2 TABLET ORAL
Qty: 0 | Refills: 0 | DISCHARGE

## 2022-11-28 NOTE — H&P PST ADULT - PULMONARY EMBOLUS
01/09/20 0945   Final Note   Assessment Type Final Discharge Note   Anticipated Discharge Disposition Home   Hospital Follow Up  Appt(s) scheduled? Yes   Discharge plans and expectations educations in teach back method with documentation complete? Yes   Right Care Referral Info   Post Acute Recommendation No Care   pts nurse Tamera notified pt can d/c from CM standpoint.   no

## 2022-11-28 NOTE — H&P PST ADULT - NSICDXFAMILYHX_GEN_ALL_CORE_FT
FAMILY HISTORY:  Father  Still living? Unknown  Family hx of prostate cancer, Age at diagnosis: Age Unknown

## 2022-11-28 NOTE — H&P PST ADULT - HISTORY OF PRESENT ILLNESS
73 y/o male with , he is scheduled for ICD implant with Dr. Contreras, [facility] of CAD, Aortic valve replaced with mechanical valve, Atrial fib,  HTN, HLD. Pt denies dizziness, CP, SOB. He is here for PST prior to procedure.

## 2022-11-28 NOTE — H&P PST ADULT - NSICDXPASTMEDICALHX_GEN_ALL_CORE_FT
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

## 2022-11-28 NOTE — H&P PST ADULT - ASSESSMENT
71 y/o male with , he is scheduled for ICD implant with Dr. Contreras, [facility] of CAD, Aortic valve replaced with mechanical valve, Atrial fib,  HTN, HLD. Pt denies dizziness, CP, SOB.   Plan  1. Stop all NSAIDS, herbal supplements and vitamins for 7 days.  2. NPO at midnight  3. Pre procedure medication (including Coumadin) instructions as per EP MD/NP  4. Use EZ sponges as directed  5. Use mupirocin as directed  6. Labs, EKG, as per EP MD/NP orders. PET scan done 10/2022, report on chart/HIE (no CXR done today).  7. COVID test done today

## 2022-11-28 NOTE — H&P PST ADULT - NSICDXPASTSURGICALHX_GEN_ALL_CORE_FT
PAST SURGICAL HISTORY:  Aortic valve replaced st judes mechanical 2017    H/O coronary artery bypass surgery 2006    H/O cystoscopy x2    H/O inguinal hernia repair     S/P tonsillectomy

## 2022-11-29 DIAGNOSIS — I47.29 OTHER VENTRICULAR TACHYCARDIA: ICD-10-CM

## 2022-11-29 NOTE — ASU PATIENT PROFILE, ADULT - FALL HARM RISK - UNIVERSAL INTERVENTIONS
Bed in lowest position, wheels locked, appropriate side rails in place/Call bell, personal items and telephone in reach/Instruct patient to call for assistance before getting out of bed or chair/Non-slip footwear when patient is out of bed/[place] to call system/Physically safe environment - no spills, clutter or unnecessary equipment/Purposeful Proactive Rounding/Room/bathroom lighting operational, light cord in reach

## 2022-11-29 NOTE — ASU PATIENT PROFILE, ADULT - NSICDXPASTMEDICALHX_GEN_ALL_CORE_FT
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

## 2022-11-30 ENCOUNTER — RESULT REVIEW (OUTPATIENT)
Age: 72
End: 2022-11-30

## 2022-11-30 ENCOUNTER — OUTPATIENT (OUTPATIENT)
Dept: OUTPATIENT SERVICES | Facility: HOSPITAL | Age: 72
LOS: 1 days | Discharge: HOME | End: 2022-11-30
Payer: MEDICARE

## 2022-11-30 VITALS
SYSTOLIC BLOOD PRESSURE: 156 MMHG | DIASTOLIC BLOOD PRESSURE: 88 MMHG | HEART RATE: 58 BPM | OXYGEN SATURATION: 100 % | TEMPERATURE: 97 F | WEIGHT: 182.98 LBS | HEIGHT: 69 IN | RESPIRATION RATE: 18 BRPM

## 2022-11-30 DIAGNOSIS — Z98.890 OTHER SPECIFIED POSTPROCEDURAL STATES: Chronic | ICD-10-CM

## 2022-11-30 DIAGNOSIS — I47.29 OTHER VENTRICULAR TACHYCARDIA: ICD-10-CM

## 2022-11-30 DIAGNOSIS — Z90.89 ACQUIRED ABSENCE OF OTHER ORGANS: Chronic | ICD-10-CM

## 2022-11-30 DIAGNOSIS — Z95.2 PRESENCE OF PROSTHETIC HEART VALVE: Chronic | ICD-10-CM

## 2022-11-30 DIAGNOSIS — Z95.1 PRESENCE OF AORTOCORONARY BYPASS GRAFT: Chronic | ICD-10-CM

## 2022-11-30 PROCEDURE — 93005 ELECTROCARDIOGRAM TRACING: CPT

## 2022-11-30 PROCEDURE — 85025 COMPLETE CBC W/AUTO DIFF WBC: CPT

## 2022-11-30 PROCEDURE — 36415 COLL VENOUS BLD VENIPUNCTURE: CPT

## 2022-11-30 PROCEDURE — 93010 ELECTROCARDIOGRAM REPORT: CPT

## 2022-11-30 PROCEDURE — 82962 GLUCOSE BLOOD TEST: CPT

## 2022-11-30 PROCEDURE — C1777: CPT

## 2022-11-30 PROCEDURE — C1898: CPT

## 2022-11-30 PROCEDURE — 80048 BASIC METABOLIC PNL TOTAL CA: CPT

## 2022-11-30 PROCEDURE — 71045 X-RAY EXAM CHEST 1 VIEW: CPT | Mod: 26

## 2022-11-30 PROCEDURE — 85610 PROTHROMBIN TIME: CPT

## 2022-11-30 PROCEDURE — C1894: CPT

## 2022-11-30 PROCEDURE — 33249 INSJ/RPLCMT DEFIB W/LEAD(S): CPT

## 2022-11-30 PROCEDURE — C1889: CPT

## 2022-11-30 PROCEDURE — 71045 X-RAY EXAM CHEST 1 VIEW: CPT

## 2022-11-30 PROCEDURE — C1721: CPT

## 2022-11-30 RX ORDER — CEFAZOLIN SODIUM 1 G
1000 VIAL (EA) INJECTION EVERY 8 HOURS
Refills: 0 | Status: DISCONTINUED | OUTPATIENT
Start: 2022-11-30 | End: 2022-11-30

## 2022-11-30 RX ORDER — METOPROLOL TARTRATE 50 MG
100 TABLET ORAL DAILY
Refills: 0 | Status: DISCONTINUED | OUTPATIENT
Start: 2022-11-30 | End: 2022-11-30

## 2022-11-30 RX ORDER — CEFAZOLIN SODIUM 1 G
2000 VIAL (EA) INJECTION ONCE
Refills: 0 | Status: COMPLETED | OUTPATIENT
Start: 2022-11-30 | End: 2022-11-30

## 2022-11-30 RX ORDER — WARFARIN SODIUM 2.5 MG/1
8 TABLET ORAL DAILY
Refills: 0 | Status: DISCONTINUED | OUTPATIENT
Start: 2022-11-30 | End: 2022-12-01

## 2022-11-30 RX ORDER — AMLODIPINE BESYLATE 2.5 MG/1
5 TABLET ORAL DAILY
Refills: 0 | Status: DISCONTINUED | OUTPATIENT
Start: 2022-11-30 | End: 2022-12-01

## 2022-11-30 RX ORDER — METOPROLOL TARTRATE 50 MG
100 TABLET ORAL
Refills: 0 | Status: DISCONTINUED | OUTPATIENT
Start: 2022-11-30 | End: 2022-12-01

## 2022-11-30 RX ORDER — ACETAMINOPHEN 500 MG
1000 TABLET ORAL ONCE
Refills: 0 | Status: COMPLETED | OUTPATIENT
Start: 2022-11-30 | End: 2022-11-30

## 2022-11-30 RX ORDER — CEFAZOLIN SODIUM 1 G
1000 VIAL (EA) INJECTION EVERY 8 HOURS
Refills: 0 | Status: COMPLETED | OUTPATIENT
Start: 2022-11-30 | End: 2022-11-30

## 2022-11-30 RX ORDER — ZOLPIDEM TARTRATE 10 MG/1
5 TABLET ORAL AT BEDTIME
Refills: 0 | Status: DISCONTINUED | OUTPATIENT
Start: 2022-11-30 | End: 2022-11-30

## 2022-11-30 RX ORDER — BUDESONIDE AND FORMOTEROL FUMARATE DIHYDRATE 160; 4.5 UG/1; UG/1
2 AEROSOL RESPIRATORY (INHALATION)
Refills: 0 | Status: DISCONTINUED | OUTPATIENT
Start: 2022-11-30 | End: 2022-12-01

## 2022-11-30 RX ORDER — LOSARTAN POTASSIUM 100 MG/1
100 TABLET, FILM COATED ORAL DAILY
Refills: 0 | Status: DISCONTINUED | OUTPATIENT
Start: 2022-11-30 | End: 2022-12-01

## 2022-11-30 RX ORDER — ASPIRIN/CALCIUM CARB/MAGNESIUM 324 MG
81 TABLET ORAL DAILY
Refills: 0 | Status: DISCONTINUED | OUTPATIENT
Start: 2022-11-30 | End: 2022-12-01

## 2022-11-30 RX ADMIN — Medication 100 MILLIGRAM(S): at 18:17

## 2022-11-30 RX ADMIN — Medication 1000 MILLIGRAM(S): at 11:00

## 2022-11-30 RX ADMIN — WARFARIN SODIUM 8 MILLIGRAM(S): 2.5 TABLET ORAL at 22:04

## 2022-11-30 RX ADMIN — Medication 100 MILLIGRAM(S): at 08:50

## 2022-11-30 RX ADMIN — Medication 400 MILLIGRAM(S): at 18:17

## 2022-11-30 RX ADMIN — Medication 1000 MILLIGRAM(S): at 22:04

## 2022-11-30 RX ADMIN — Medication 81 MILLIGRAM(S): at 18:17

## 2022-11-30 RX ADMIN — Medication 400 MILLIGRAM(S): at 10:25

## 2022-11-30 RX ADMIN — Medication 1000 MILLIGRAM(S): at 15:14

## 2022-11-30 RX ADMIN — ZOLPIDEM TARTRATE 5 MILLIGRAM(S): 10 TABLET ORAL at 22:04

## 2022-11-30 NOTE — PACU DISCHARGE NOTE - COMMENTS
Report given to Keisha MALDONADO, patient placed on Zoll DeFIB.  Reviewed post procedure care and activity with patient, expressed understanding.  Site intact, pressure dsg in place.  No evidence of a hematoma

## 2022-11-30 NOTE — PATIENT PROFILE ADULT - FALL HARM RISK - HARM RISK INTERVENTIONS
Assistance with ambulation/Communicate Risk of Fall with Harm to all staff/Monitor gait and stability/Reinforce activity limits and safety measures with patient and family/Review medications for side effects contributing to fall risk/Tailored Fall Risk Interventions/Visual Cue: Yellow wristband and red socks/Bed in lowest position, wheels locked, appropriate side rails in place/Call bell, personal items and telephone in reach/Instruct patient to call for assistance before getting out of bed or chair/Non-slip footwear when patient is out of bed/[place] to call system/Physically safe environment - no spills, clutter or unnecessary equipment/Purposeful Proactive Rounding/Room/bathroom lighting operational, light cord in reach

## 2022-12-01 ENCOUNTER — TRANSCRIPTION ENCOUNTER (OUTPATIENT)
Age: 72
End: 2022-12-01

## 2022-12-01 VITALS
OXYGEN SATURATION: 95 % | SYSTOLIC BLOOD PRESSURE: 119 MMHG | HEART RATE: 63 BPM | DIASTOLIC BLOOD PRESSURE: 67 MMHG | TEMPERATURE: 98 F | RESPIRATION RATE: 17 BRPM

## 2022-12-01 LAB
ANION GAP SERPL CALC-SCNC: 3 MMOL/L — LOW (ref 5–17)
BASOPHILS # BLD AUTO: 0.01 K/UL — SIGNIFICANT CHANGE UP (ref 0–0.2)
BASOPHILS NFR BLD AUTO: 0.2 % — SIGNIFICANT CHANGE UP (ref 0–2)
BUN SERPL-MCNC: 19 MG/DL — SIGNIFICANT CHANGE UP (ref 7–23)
CALCIUM SERPL-MCNC: 9.2 MG/DL — SIGNIFICANT CHANGE UP (ref 8.5–10.1)
CHLORIDE SERPL-SCNC: 107 MMOL/L — SIGNIFICANT CHANGE UP (ref 96–108)
CO2 SERPL-SCNC: 27 MMOL/L — SIGNIFICANT CHANGE UP (ref 22–31)
CREAT SERPL-MCNC: 1.31 MG/DL — HIGH (ref 0.5–1.3)
EGFR: 57 ML/MIN/1.73M2 — LOW
EOSINOPHIL # BLD AUTO: 0.07 K/UL — SIGNIFICANT CHANGE UP (ref 0–0.5)
EOSINOPHIL NFR BLD AUTO: 1.6 % — SIGNIFICANT CHANGE UP (ref 0–6)
GLUCOSE SERPL-MCNC: 106 MG/DL — HIGH (ref 70–99)
HCT VFR BLD CALC: 35.6 % — LOW (ref 39–50)
HGB BLD-MCNC: 12.8 G/DL — LOW (ref 13–17)
IMM GRANULOCYTES NFR BLD AUTO: 0.2 % — SIGNIFICANT CHANGE UP (ref 0–0.9)
INR BLD: 2.85 RATIO — HIGH (ref 0.88–1.16)
LYMPHOCYTES # BLD AUTO: 1.02 K/UL — SIGNIFICANT CHANGE UP (ref 1–3.3)
LYMPHOCYTES # BLD AUTO: 23.4 % — SIGNIFICANT CHANGE UP (ref 13–44)
MCHC RBC-ENTMCNC: 34.5 PG — HIGH (ref 27–34)
MCHC RBC-ENTMCNC: 36 GM/DL — SIGNIFICANT CHANGE UP (ref 32–36)
MCV RBC AUTO: 96 FL — SIGNIFICANT CHANGE UP (ref 80–100)
MONOCYTES # BLD AUTO: 0.65 K/UL — SIGNIFICANT CHANGE UP (ref 0–0.9)
MONOCYTES NFR BLD AUTO: 14.9 % — HIGH (ref 2–14)
NEUTROPHILS # BLD AUTO: 2.59 K/UL — SIGNIFICANT CHANGE UP (ref 1.8–7.4)
NEUTROPHILS NFR BLD AUTO: 59.7 % — SIGNIFICANT CHANGE UP (ref 43–77)
PLATELET # BLD AUTO: 130 K/UL — LOW (ref 150–400)
POTASSIUM SERPL-MCNC: 4.5 MMOL/L — SIGNIFICANT CHANGE UP (ref 3.5–5.3)
POTASSIUM SERPL-SCNC: 4.5 MMOL/L — SIGNIFICANT CHANGE UP (ref 3.5–5.3)
PROTHROM AB SERPL-ACNC: 33.4 SEC — HIGH (ref 10.5–13.4)
RBC # BLD: 3.71 M/UL — LOW (ref 4.2–5.8)
RBC # FLD: 13.6 % — SIGNIFICANT CHANGE UP (ref 10.3–14.5)
SODIUM SERPL-SCNC: 137 MMOL/L — SIGNIFICANT CHANGE UP (ref 135–145)
WBC # BLD: 4.35 K/UL — SIGNIFICANT CHANGE UP (ref 3.8–10.5)
WBC # FLD AUTO: 4.35 K/UL — SIGNIFICANT CHANGE UP (ref 3.8–10.5)

## 2022-12-01 PROCEDURE — 93010 ELECTROCARDIOGRAM REPORT: CPT

## 2022-12-01 RX ADMIN — Medication 100 MILLIGRAM(S): at 06:43

## 2022-12-01 RX ADMIN — AMLODIPINE BESYLATE 5 MILLIGRAM(S): 2.5 TABLET ORAL at 09:52

## 2022-12-01 RX ADMIN — Medication 81 MILLIGRAM(S): at 09:53

## 2022-12-01 RX ADMIN — LOSARTAN POTASSIUM 100 MILLIGRAM(S): 100 TABLET, FILM COATED ORAL at 09:53

## 2022-12-01 NOTE — PROGRESS NOTE ADULT - ASSESSMENT
71 yo M with PMHx of CAD s/p CABG, s/p mechanical AVR, post-op PAF, non ischemic CMP, Abnormal cardiac MRI, Ventricular tachycardia, s/p dual chamber ICD implant on 11/30/22.  Post-op pt remains stable, denies chest pain/SOB/palpitations/acute pain at ICD site.  -Antibiotics as ordered  -continue b-blockers  -continue coumadin to keep INR2.5-3.5  -ICD booklet & temporary ID card given  -ICD interrogated today: normal functioning  -Post d/c & wound care instruction given to pt  and all questions answered  -Discharge today  -Follow up appointment made on 12/13/22 @3:30pm  in EP clinic for wound check  -If pt develops chest pain/palpitations/shortness of breath/pain or swelling at procedure site, call EP clinic at [phone]  Plan d/w pt//RN

## 2022-12-01 NOTE — DISCHARGE NOTE PROVIDER - CARE PROVIDERS DIRECT ADDRESSES
[email or web address]

## 2022-12-01 NOTE — DISCHARGE NOTE PROVIDER - NSDCCPCAREPLAN_GEN_ALL_CORE_FT
PRINCIPAL DISCHARGE DIAGNOSIS  Diagnosis: Ventricular tachycardia  Assessment and Plan of Treatment: -You had ICD implant by  on 11/30/22  -follow post-d/c instructions  -f/u in EP clinic on 12/13 @3:30pm      SECONDARY DISCHARGE DIAGNOSES  Diagnosis: S/P AVR (aortic valve replacement)  Assessment and Plan of Treatment: -continue coumadin to keep INR 2.5-3.5

## 2022-12-01 NOTE — PROGRESS NOTE ADULT - SUBJECTIVE AND OBJECTIVE BOX
INTERVAL HPI/OVERNIGHT EVENTS: 73 yo M with PMHx of CAD s/p CABG, s/p mechanical AVR, post-op PAF,     MEDICATIONS  (STANDING):  amLODIPine   Tablet 5 milliGRAM(s) Oral daily  aspirin enteric coated 81 milliGRAM(s) Oral daily  budesonide 160 MICROgram(s)/formoterol 4.5 MICROgram(s) Inhaler 2 Puff(s) Inhalation two times a day  losartan 100 milliGRAM(s) Oral daily  metoprolol tartrate 100 milliGRAM(s) Oral two times a day  warfarin 8 milliGRAM(s) Oral daily    MEDICATIONS  (PRN):      Allergies    hay fever (Sneezing)  No Known Drug Allergies    Intolerances      ROS:  General: Pt denies recent weight loss/fever/chills    Neurological: denies numbness or  sensation loss    Cardiovascular: denies chest pain/palpitations/leg edema    Respiratory and Thorax: denies SOB/cough/wheezing    Gastrointestinal: denies abdominal pain/diarrhea/constipation/bloody stool    Genitourinary: denies urinary frequency/urgency/ dysuria    Musculoskeletal: denies joint pain or swelling, denies restricted motion    Hematologic: denies abnormal bleeding  	    	  	    		        	    	          Physical Exam:  Vital Signs Last 24 Hrs  T(C): 36.8 (01 Dec 2022 09:45), Max: 36.8 (30 Nov 2022 17:00)  T(F): 98.3 (01 Dec 2022 09:45), Max: 98.3 (30 Nov 2022 17:00)  HR: 63 (01 Dec 2022 09:45) (60 - 112)  BP: 119/67 (01 Dec 2022 09:45) (101/63 - 134/56)  BP(mean): --  RR: 17 (01 Dec 2022 09:45) (16 - 18)  SpO2: 95% (01 Dec 2022 09:45) (95% - 100%)    Parameters below as of 01 Dec 2022 09:45  Patient On (Oxygen Delivery Method): room air      Vital Signs : BP        HR       RR                 Constitutional: well developed, well nourished, no deformities and no acute distress    Neurological: Alert & Oriented x 3, MAGAÑA, no focal deficits    HEENT: NC/AT, PERRLA, EOMI,  Neck supple.    Respiratory: CTA B/L, No wheezing/crackles/rhonchi    Cardiovascular: (+) S1 & S2, RRR, No m/r/g    Gastrointestinal: soft, NT, nondistended, (+) BS    Genitourinary: non distended bladder, voiding freely    Extremities: No pedal edema, No clubbing, No cyanosis    Skin:  normal skin color and pigmentation, no skin lesions          LABS:                        12.8   4.35  )-----------( 130      ( 01 Dec 2022 05:21 )             35.6     12-01    137  |  107  |  19  ----------------------------<  106<H>  4.5   |  27  |  1.31<H>    Ca    9.2      01 Dec 2022 05:21      PT/INR - ( 01 Dec 2022 05:21 )   PT: 33.4 sec;   INR: 2.85 ratio               RADIOLOGY & ADDITIONAL TESTS:    TELE:    EKG:    CXR:    Antibiotics as ordered  continue b-blockers  Anticoagulation   PPM/ICD booklet & temporary ID card given  Post d/c & wound care instruction given to pt & family and all questions answered  Discharge today  Follow up appointment made on                   in EP clinic for wound check     If pt develops chest pain/palpitations/shortness of breath/pain or swelling at procedure site, call EP clinic at [phone] INTERVAL HPI/OVERNIGHT EVENTS: 73 yo M with PMHx of CAD s/p CABG, s/p mechanical AVR, post-op PAF, non ischemic CMP, abnormal cardiac MRI, Ventricular tachycardia, s/p dual chamber ICD implant on 11/30/22.  Post-op pt remains stable, denies chest pain/SOB/palpitations/acute pain at ICD site.    tele: SR 60-80's  EKG: SR 59bpm  CXR: ICD leads are in good position, no pneumothorax    MEDICATIONS  (STANDING):  amLODIPine   Tablet 5 milliGRAM(s) Oral daily  aspirin enteric coated 81 milliGRAM(s) Oral daily  budesonide 160 MICROgram(s)/formoterol 4.5 MICROgram(s) Inhaler 2 Puff(s) Inhalation two times a day  losartan 100 milliGRAM(s) Oral daily  metoprolol tartrate 100 milliGRAM(s) Oral two times a day  warfarin 8 milliGRAM(s) Oral daily        Allergies    hay fever (Sneezing)  No Known Drug Allergies    ROS: All other ROS is negative unless indicated above.      Physical Exam:  Vital Signs Last 24 Hrs  T(C): 36.8 (01 Dec 2022 09:45), Max: 36.8 (30 Nov 2022 17:00)  T(F): 98.3 (01 Dec 2022 09:45), Max: 98.3 (30 Nov 2022 17:00)  HR: 63 (01 Dec 2022 09:45) (60 - 112)  BP: 119/67 (01 Dec 2022 09:45) (101/63 - 134/56)  RR: 17 (01 Dec 2022 09:45) (16 - 18)  SpO2: 95% (01 Dec 2022 09:45) (95% - 100%)    Parameters below as of 01 Dec 2022 09:45  Patient On (Oxygen Delivery Method): room air      Constitutional: well developed,  no deformities and no acute distress    Neurological: Alert & Oriented x 3, MAGAÑA, no focal deficits    HEENT: NC/AT, PERRLA, EOMI,  Neck supple.    Respiratory: CTA B/L, No wheezing/crackles/rhonchi    Cardiovascular: (+) S1 & S2, RRR, (+)valvular click    Gastrointestinal: soft, NT, nondistended, (+) BS    Genitourinary: non distended bladder, voiding freely    Extremities: No pedal edema, No clubbing, No cyanosis    Skin: Lt infraclavicular ICD site : C/D/I, no hematoma (+)kristian intact              LABS:                        12.8   4.35  )-----------( 130      ( 01 Dec 2022 05:21 )             35.6     12-01    137  |  107  |  19  ----------------------------<  106<H>  4.5   |  27  |  1.31<H>    Ca    9.2      01 Dec 2022 05:21      PT/INR - ( 01 Dec 2022 05:21 )   PT: 33.4 sec;   INR: 2.85 ratio

## 2022-12-01 NOTE — DISCHARGE NOTE NURSING/CASE MANAGEMENT/SOCIAL WORK - NSDCPEFALRISK_GEN_ALL_CORE
For information on Fall & Injury Prevention, visit: [email or web address] OR  [email or web address] OR  https://www.cdc.gov/steadi/patient.html

## 2022-12-01 NOTE — DISCHARGE NOTE PROVIDER - NSDCFUADDINST_GEN_ALL_CORE_FT
Keep wound dry for 5 days. May shower in 6th day post-op, no rubbing the wound. No driving for 2 weeks. Do not soak wound under water for 6 weeks. Do not raise Lt arm above shoulder level for 6 weeks. No heavy lifting more than 5lbs with Lt hand for 6weeks. If you develop fever or swelling/redness/discharge from wound, call EP clinic at [phone].

## 2022-12-01 NOTE — DISCHARGE NOTE PROVIDER - PROVIDER TOKENS
PROVIDER:[TOKEN:[[id number]],SCHEDULEDAPPT:[12/13/2022],SCHEDULEDAPPTTIME:[03:30 PM],ESTABLISHEDPATIENT:[T]]

## 2022-12-01 NOTE — DISCHARGE NOTE PROVIDER - NSDCMRMEDTOKEN_GEN_ALL_CORE_FT
Aspirin Enteric Coated 81 mg oral delayed release tablet: 1 tab(s) orally once a day  losartan 100 mg oral tablet: 1 tab(s) orally once a day  Metoprolol Tartrate 100 mg oral tablet: 1 tab(s) orally every 12 hours  Norvasc 5 mg oral tablet: 1 tab(s) orally once a day  Repatha SureClick 140 mg/mL subcutaneous solution: Inject every 2 weeks  Symbicort 160 mcg-4.5 mcg/inh inhalation aerosol: 2 puff(s) inhaled 2 times a day  warfarin 10 mg oral tablet: 1 tab(s) orally once a day on Sundays  warfarin 4 mg oral tablet: 2 tab(s) orally once a day  Monday to Saturday  preop instructions as per EP MD/NP

## 2022-12-01 NOTE — DISCHARGE NOTE PROVIDER - CARE PROVIDER_API CALL
Mp Contreras (MD)  Cardiac Electrophysiology; Cardiovascular Disease  [address] [place] [address]  Phone: [phone]  Fax: [phone]  Established Patient  Scheduled Appointment: 12/13/2022 03:30 PM

## 2022-12-01 NOTE — DISCHARGE NOTE PROVIDER - HOSPITAL COURSE
71 yo M with PMHx of CAD s/p CABG, s/p mechanical AVR, post-op PAF, non ischemic CMP, abnormal cardiac MRI, Ventricular tachycardia, s/p dual chamber ICD implant on 11/30/22.  Post-op pt remains stable, denies chest pain/SOB/palpitations/acute pain at ICD site.    tele: SR 60-80's  EKG: SR 59bpm  CXR: ICD leads are in good position, no pneumothorax    Skin: Lt infraclavicular ICD site : C/D/I, no hematoma (+)prineo intact    -Antibiotics as ordered  -continue b-blockers  -continue coumadin to keep INR2.5-3.5  -ICD booklet & temporary ID card given  -ICD interrogated today: normal functioning  -Post d/c & wound care instruction given to pt  and all questions answered  -Discharge today  -Follow up appointment made on 12/13/22 @3:30pm  in EP clinic for wound check  -If pt develops chest pain/palpitations/shortness of breath/pain or swelling at procedure site, call EP clinic at [phone]  Plan d/w pt//RN

## 2022-12-01 NOTE — DISCHARGE NOTE NURSING/CASE MANAGEMENT/SOCIAL WORK - PATIENT PORTAL LINK FT
You can access the FollowMyHealth Patient Portal offered by [facility] by registering at the following website: [email or web address]. By joining [employer]’s FollowMyHealth portal, you will also be able to view your health information using other applications (apps) compatible with our system.

## 2022-12-05 DIAGNOSIS — I47.20 VENTRICULAR TACHYCARDIA, UNSPECIFIED: ICD-10-CM

## 2022-12-12 NOTE — CARDIOLOGY SUMMARY
[de-identified] : 6/28/22 : Sinus Rhythm  61 WITHIN NORMAL LIMITS [de-identified] : 6/18/20 : TTE : Left ventricle is normal in size, there is mild concentric left ventricular hypertrophy.  The LV systolic function is normal.  Transmitral spectral Doppler pattern suggestive of impaired LV relaxation.  There is mild mitral regurgitation.  There is trace tricuspid regurgitation.  Right ventricular systolic pressure is normal.  There is mechanical aortic valve.  The gradient is normal for this prosthetic aortic valve.  The Doppler suggests a periprosthetic leak of the prosthetic aortic valve.  This is trace in severity.  The study was technically adequate. [de-identified] : 6/9/22 : MRI left ventricle is normal in size with normal systolic function.  Right ventricle size is normal with normal systolic function.  There is a mechanical valve in the aortic position.  Myocardial T1 relaxation time is within normal limits which suggest there is normal myocardial composition.  The myocardial T2 relaxation time is within normal limits which suggests the absence of myocardial edema or inflammation.  On late gadolinium enhancement images there is focal mid wall hyperenhancement in the basal inferior inferolateral and anterolateral segments and a non-CAD pattern. [de-identified] : 10/31/22 Cardiac PET :\par \par \par IMPRESSION:  Abnormal FDG-PET/CT and resting myocardial perfusion \par SPECT/CT cardiac sarcoid protocol.\par \par 1. Heterogeneous, mildly increased FDG activity in left ventricular \par myocardium, most intense in the anterolateral wall, septum and inferior \par wall at the base. This pattern of uptake is the same on 9/30/2022 and \par 10/13/2022, suggesting that FDG activity reflects inflammation, possibly \par related to sarcoidosis, rather than incomplete suppression of physiologic \par FDG activity. In addition, the distribution of FDG activity corresponds \par to areas of late gadolinium enhancement noted on cardiac MRI.\par \par 2. Resting myocardial gated SPECT/CT shows normal left ventricular \par myocardial perfusion, wall motion, and left ventricular ejection fraction \par of 67%.\par \par 3. Few small FDG-avid, noncalcified bilateral mediastinal and hilar lymph \par nodes are not significantly changed as compared to CT from 6/22/2021. \par Further evaluation may be performed with EBUS biopsy.\par \par 4. A nonspecific 4 mm right upper lobe pulmonary nodule is unchanged as \par compared to CT from 6/22/2021, and is below the limit of resolution of \par PET. A calcified left upper lobe granuloma also is unchanged. No new lung \par nodule.\par \par 5. Enlarged prostate gland with few nonspecific foci of mildly increased \par FDG activity. Differential diagnosis includes infectious/inflammatory \par etiologies and prostate cancer. Urology consultation and prostate MRI may \par be obtained for further evaluation.\par \par 6. Nonspecific diffuse esophageal hypermetabolism, most intense at the \par level of the robin, may reflect inflammation. Please correlate \par clinically and with endoscopy, as indicated.\par \par \par \par

## 2022-12-12 NOTE — HISTORY OF PRESENT ILLNESS
[FreeTextEntry1] : This is a 72-year-old man who presents for evaluation of nonsustained ventricular tachycardia, and an abnormal cardiac MRI.  He has  HTN, HLD, CAD s/p CABG x3 in 2006, s/p TAVR 9/2016 failed, with subsequent prosthetic AVR i 1/3/2017 by Dr Prakash in [facility]. He has a history of thrombocytopenia. CHERI PAYNE  denies chest pain, chest pressure, shortness of breath, lightheadedness, dizziness, palpitations, syncope, presyncope, orthopnea, PND, or edema.

## 2022-12-12 NOTE — ASSESSMENT
[FreeTextEntry1] : This is a 71-year-old man who presents for evaluation of nonsustained ventricular tachycardia, and an abnormal cardiac MRI.  PET scan consistent with cardiac Sarcoidosis. \par \par \par A thorough discussion was had with the patient and his family concerning all aspects of ICD therapy. We reviewed the data supporting ICD therapy and how it applies individually to this patient. We discussed the procedure, risks and outcomes of the ICD implantation and living with an ICD. We discussed management of the ICD throughout life, including potential to deactivate ICD therapies if goals of care change. After all questions were answered, it was a shared decision to proceed with ICD therapy.\par  \par

## 2022-12-13 ENCOUNTER — NON-APPOINTMENT (OUTPATIENT)
Age: 72
End: 2022-12-13

## 2022-12-13 ENCOUNTER — APPOINTMENT (OUTPATIENT)
Dept: ELECTROPHYSIOLOGY | Facility: CLINIC | Age: 72
End: 2022-12-13

## 2022-12-13 VITALS
WEIGHT: 188 LBS | HEIGHT: 69 IN | OXYGEN SATURATION: 100 % | SYSTOLIC BLOOD PRESSURE: 134 MMHG | BODY MASS INDEX: 27.85 KG/M2 | HEART RATE: 71 BPM | DIASTOLIC BLOOD PRESSURE: 82 MMHG

## 2022-12-13 PROCEDURE — 93283 PRGRMG EVAL IMPLANTABLE DFB: CPT

## 2022-12-13 PROCEDURE — 99024 POSTOP FOLLOW-UP VISIT: CPT

## 2022-12-14 PROBLEM — J45.909 UNSPECIFIED ASTHMA, UNCOMPLICATED: Chronic | Status: ACTIVE | Noted: 2022-11-28

## 2022-12-14 PROBLEM — I47.29 OTHER VENTRICULAR TACHYCARDIA: Chronic | Status: ACTIVE | Noted: 2022-11-28

## 2022-12-14 PROBLEM — C44.90 UNSPECIFIED MALIGNANT NEOPLASM OF SKIN, UNSPECIFIED: Chronic | Status: ACTIVE | Noted: 2022-11-28

## 2022-12-14 PROBLEM — I48.91 UNSPECIFIED ATRIAL FIBRILLATION: Chronic | Status: ACTIVE | Noted: 2022-11-28

## 2022-12-20 ENCOUNTER — APPOINTMENT (OUTPATIENT)
Dept: ELECTROPHYSIOLOGY | Facility: CLINIC | Age: 72
End: 2022-12-20

## 2022-12-20 VITALS
BODY MASS INDEX: 27.85 KG/M2 | DIASTOLIC BLOOD PRESSURE: 80 MMHG | SYSTOLIC BLOOD PRESSURE: 141 MMHG | HEART RATE: 58 BPM | WEIGHT: 188 LBS | HEIGHT: 69 IN | OXYGEN SATURATION: 100 % | RESPIRATION RATE: 16 BRPM

## 2022-12-20 DIAGNOSIS — I51.7 CARDIOMEGALY: ICD-10-CM

## 2022-12-20 PROCEDURE — 99024 POSTOP FOLLOW-UP VISIT: CPT

## 2023-01-10 ENCOUNTER — NON-APPOINTMENT (OUTPATIENT)
Age: 73
End: 2023-01-10

## 2023-02-19 ENCOUNTER — NON-APPOINTMENT (OUTPATIENT)
Age: 73
End: 2023-02-19

## 2023-02-24 ENCOUNTER — APPOINTMENT (OUTPATIENT)
Dept: GASTROENTEROLOGY | Facility: CLINIC | Age: 73
End: 2023-02-24
Payer: MEDICARE

## 2023-02-24 VITALS
BODY MASS INDEX: 27.99 KG/M2 | HEIGHT: 69 IN | HEART RATE: 71 BPM | SYSTOLIC BLOOD PRESSURE: 129 MMHG | WEIGHT: 189 LBS | DIASTOLIC BLOOD PRESSURE: 89 MMHG

## 2023-02-24 DIAGNOSIS — K21.9 GASTRO-ESOPHAGEAL REFLUX DISEASE W/OUT ESOPHAGITIS: ICD-10-CM

## 2023-02-24 PROCEDURE — 99213 OFFICE O/P EST LOW 20 MIN: CPT

## 2023-02-24 NOTE — HISTORY OF PRESENT ILLNESS
[FreeTextEntry1] : Mr. CHERI PYANE is a 72 year old male with history of Hardy myopathy and arrhythmias.  Patient has implantable defibrillator and is on Coumadin.  Patient is being evaluated for possible cardiac sarcoidosis.  A PET scan was performed which demonstrated inflammation in the distal esophagus.  Patient has noted frequent heartburn symptoms for which he takes H2 blockers.  There is minimal complaints of dysphagia.\par

## 2023-02-24 NOTE — PHYSICAL EXAM
[Alert] : alert [Normal Voice/Communication] : normal voice/communication [Healthy Appearing] : healthy appearing [No Acute Distress] : no acute distress [Sclera] : the sclera and conjunctiva were normal [Hearing Threshold Finger Rub Not [place]] : hearing was normal [Normal Lips/Gums] : the lips and gums were normal [Oropharynx] : the oropharynx was normal [Normal Appearance] : the appearance of the neck was normal [No Neck Mass] : no neck mass was observed [No Respiratory Distress] : no respiratory distress [No Acc Muscle Use] : no accessory muscle use [Respiration, Rhythm And Depth] : normal respiratory rhythm and effort [Auscultation Breath Sounds / Voice Sounds] : lungs were clear to auscultation bilaterally [Heart Rate And Rhythm] : heart rate was normal and rhythm regular [Normal S1, S2] : normal S1 and S2 [Murmurs] : no murmurs [Bowel Sounds] : normal bowel sounds [Abdomen Tenderness] : non-tender [No Masses] : no abdominal mass palpated [Abdomen Soft] : soft [] : no hepatosplenomegaly [Oriented To Time, Place, And Person] : oriented to person, place, and time

## 2023-02-24 NOTE — ASSESSMENT
[FreeTextEntry1] : 71 yo male with history of possible sarcoidosis.  Given abnormality seen on PET scan and chronic GERD symptoms, will arrange for upper endoscopy at [facility].  Patient will require cardiology clearance about holding Coumadin.

## 2023-03-16 ENCOUNTER — OUTPATIENT (OUTPATIENT)
Dept: OUTPATIENT SERVICES | Facility: HOSPITAL | Age: 73
LOS: 1 days | Discharge: HOME | End: 2023-03-16
Payer: MEDICARE

## 2023-03-16 ENCOUNTER — RESULT REVIEW (OUTPATIENT)
Age: 73
End: 2023-03-16

## 2023-03-16 ENCOUNTER — APPOINTMENT (OUTPATIENT)
Dept: GASTROENTEROLOGY | Facility: HOSPITAL | Age: 73
End: 2023-03-16

## 2023-03-16 VITALS
HEIGHT: 69 IN | SYSTOLIC BLOOD PRESSURE: 147 MMHG | DIASTOLIC BLOOD PRESSURE: 76 MMHG | WEIGHT: 188.94 LBS | HEART RATE: 66 BPM | TEMPERATURE: 97 F | RESPIRATION RATE: 65 BRPM | OXYGEN SATURATION: 100 %

## 2023-03-16 DIAGNOSIS — Z95.2 PRESENCE OF PROSTHETIC HEART VALVE: Chronic | ICD-10-CM

## 2023-03-16 DIAGNOSIS — K21.9 GASTRO-ESOPHAGEAL REFLUX DISEASE WITHOUT ESOPHAGITIS: ICD-10-CM

## 2023-03-16 DIAGNOSIS — Z98.890 OTHER SPECIFIED POSTPROCEDURAL STATES: Chronic | ICD-10-CM

## 2023-03-16 DIAGNOSIS — Z90.89 ACQUIRED ABSENCE OF OTHER ORGANS: Chronic | ICD-10-CM

## 2023-03-16 DIAGNOSIS — Z95.1 PRESENCE OF AORTOCORONARY BYPASS GRAFT: Chronic | ICD-10-CM

## 2023-03-16 PROCEDURE — 88313 SPECIAL STAINS GROUP 2: CPT

## 2023-03-16 PROCEDURE — 88313 SPECIAL STAINS GROUP 2: CPT | Mod: 26

## 2023-03-16 PROCEDURE — 88312 SPECIAL STAINS GROUP 1: CPT | Mod: 26

## 2023-03-16 PROCEDURE — 88312 SPECIAL STAINS GROUP 1: CPT

## 2023-03-16 PROCEDURE — 88305 TISSUE EXAM BY PATHOLOGIST: CPT

## 2023-03-16 PROCEDURE — 88305 TISSUE EXAM BY PATHOLOGIST: CPT | Mod: 26

## 2023-03-16 PROCEDURE — 44361 SMALL BOWEL ENDOSCOPY/BIOPSY: CPT

## 2023-03-16 RX ORDER — WARFARIN SODIUM 2.5 MG/1
1 TABLET ORAL
Qty: 0 | Refills: 0 | DISCHARGE

## 2023-03-16 NOTE — ASU PATIENT PROFILE, ADULT - FALL HARM RISK - UNIVERSAL INTERVENTIONS
Bed in lowest position, wheels locked, appropriate side rails in place/Call bell, personal items and telephone in reach/Instruct patient to call for assistance before getting out of bed or chair/Non-slip footwear when patient is out of bed/[place] to call system/Physically safe environment - no spills, clutter or unnecessary equipment/Purposeful Proactive Rounding/Room/bathroom lighting operational, light cord in reach

## 2023-03-16 NOTE — ASU PATIENT PROFILE, ADULT - NSSUBSTANCEUSE_GEN_ALL_CORE_SD
PT DAILY TREATMENT NOTE 2-15    Patient Name: Ashely Garcia  Date:11/15/2022  : 1989  [x]  Patient  Verified  Payor: Mayra Villatoro / Plan: Henrry Chapman PPO / Product Type: PPO /    In time: 1230 pm  Out time: 130 pm  Total Treatment Time (min): 60  Visit #:  10    Treatment Area: Other low back pain [M54.59]    SUBJECTIVE  Pain Level (0-10 scale): 1/10   Any medication changes, allergies to medications, adverse drug reactions, diagnosis change, or new procedure performed?: [x] No    [] Yes (see summary sheet for update)  Subjective functional status/changes:   [] No changes reported  Pt reports he began taking the Paxil 2022. He feels like this is helping to tone things down a little. He started light duty yesterday. He is working 5 days per week, 8 hours each day. He is working on his exercises while at work. He is working on getting a chair that fits well. He might try a standing desk that they have. Is taking a super greens in the morning.      OBJECTIVE     60 min Neuromuscular Re-education:  [x]  See flow sheet : introduction of cranial torsion exercises    Rationale: increase ROM, improve coordination, improve balance, and increase proprioception  to improve the patients ability to return to work duty as tolerated               With   [] TE   [] TA   [x] Neuro   [] SC   [] other: Patient Education: [x] Review HEP    [x] Progressed/Changed HEP based on:   [x] positioning   [x] body mechanics   [x] transfers   [] heat/ice application    [] other:      Other Objective/Functional Measures:   + HGIR R  + PADT bilaterally - NT  + L HADDT - NT  Apical expansion is fair with v.c. for reduction of accessory mm activation     Seated bilateral hip abducted position on treatment table  Forward bend test 0 inches from tops of shoes  Squat Level Level 3+     HADLT R 1, L 2(was able to get to 2/5 bilaterally following intervention)      Pain Level (0-10 scale) post treatment: 0/10      ASSESSMENT/Changes in Function:     Patient will continue to benefit from skilled PT services to modify and progress therapeutic interventions, address functional mobility deficits, address ROM deficits, address strength deficits, analyze and address soft tissue restrictions, analyze and cue movement patterns, analyze and modify body mechanics/ergonomics, assess and modify postural abnormalities, address imbalance/dizziness, and instruct in home and community integration to attain remaining goals.      []  See Plan of Care  []  See progress note/recertification  []  See Discharge Summary         Progress towards goals / Updated goals:  Pt is having difficulty recruiting L ischial condylar adductor, however improves some with R adductor inhibition    PLAN  []  Upgrade activities as tolerated     [x]  Continue plan of care  [x]  Update interventions per flow sheet       [x]  Discharge due to:_  []  Other:_       Abilio Awad, PT, DPT, [facility]    11/15/2022 never used

## 2023-03-20 LAB — SURGICAL PATHOLOGY STUDY: SIGNIFICANT CHANGE UP

## 2023-03-21 ENCOUNTER — APPOINTMENT (OUTPATIENT)
Dept: ELECTROPHYSIOLOGY | Facility: CLINIC | Age: 73
End: 2023-03-21
Payer: MEDICARE

## 2023-03-21 ENCOUNTER — NON-APPOINTMENT (OUTPATIENT)
Age: 73
End: 2023-03-21

## 2023-03-21 VITALS
RESPIRATION RATE: 16 BRPM | WEIGHT: 189 LBS | OXYGEN SATURATION: 96 % | HEIGHT: 69 IN | SYSTOLIC BLOOD PRESSURE: 120 MMHG | HEART RATE: 68 BPM | BODY MASS INDEX: 27.99 KG/M2 | DIASTOLIC BLOOD PRESSURE: 74 MMHG

## 2023-03-21 DIAGNOSIS — I25.10 ATHEROSCLEROTIC HEART DISEASE OF NATIVE CORONARY ARTERY WITHOUT ANGINA PECTORIS: ICD-10-CM

## 2023-03-21 DIAGNOSIS — I42.9 CARDIOMYOPATHY, UNSPECIFIED: ICD-10-CM

## 2023-03-21 DIAGNOSIS — K21.00 GASTRO-ESOPHAGEAL REFLUX DISEASE WITH ESOPHAGITIS, WITHOUT BLEEDING: ICD-10-CM

## 2023-03-21 DIAGNOSIS — K21.9 GASTRO-ESOPHAGEAL REFLUX DISEASE WITHOUT ESOPHAGITIS: ICD-10-CM

## 2023-03-21 DIAGNOSIS — Z79.82 LONG TERM (CURRENT) USE OF ASPIRIN: ICD-10-CM

## 2023-03-21 DIAGNOSIS — F41.9 ANXIETY DISORDER, UNSPECIFIED: ICD-10-CM

## 2023-03-21 DIAGNOSIS — Z87.891 PERSONAL HISTORY OF NICOTINE DEPENDENCE: ICD-10-CM

## 2023-03-21 DIAGNOSIS — Z95.810 PRESENCE OF AUTOMATIC (IMPLANTABLE) CARDIAC DEFIBRILLATOR: ICD-10-CM

## 2023-03-21 DIAGNOSIS — R93.3 ABNORMAL FINDINGS ON DIAGNOSTIC IMAGING OF OTHER PARTS OF DIGESTIVE TRACT: ICD-10-CM

## 2023-03-21 DIAGNOSIS — I47.29 OTHER VENTRICULAR TACHYCARDIA: ICD-10-CM

## 2023-03-21 DIAGNOSIS — Z95.5 PRESENCE OF CORONARY ANGIOPLASTY IMPLANT AND GRAFT: ICD-10-CM

## 2023-03-21 DIAGNOSIS — Z88.8 ALLERGY STATUS TO OTHER DRUGS, MEDICAMENTS AND BIOLOGICAL SUBSTANCES STATUS: ICD-10-CM

## 2023-03-21 DIAGNOSIS — Z79.01 LONG TERM (CURRENT) USE OF ANTICOAGULANTS: ICD-10-CM

## 2023-03-21 DIAGNOSIS — G47.00 INSOMNIA, UNSPECIFIED: ICD-10-CM

## 2023-03-21 DIAGNOSIS — Z95.2 PRESENCE OF PROSTHETIC HEART VALVE: ICD-10-CM

## 2023-03-21 DIAGNOSIS — E78.5 HYPERLIPIDEMIA, UNSPECIFIED: ICD-10-CM

## 2023-03-21 DIAGNOSIS — I10 ESSENTIAL (PRIMARY) HYPERTENSION: ICD-10-CM

## 2023-03-21 DIAGNOSIS — Z79.51 LONG TERM (CURRENT) USE OF INHALED STEROIDS: ICD-10-CM

## 2023-03-21 DIAGNOSIS — K29.50 UNSPECIFIED CHRONIC GASTRITIS WITHOUT BLEEDING: ICD-10-CM

## 2023-03-21 DIAGNOSIS — I48.0 PAROXYSMAL ATRIAL FIBRILLATION: ICD-10-CM

## 2023-03-21 PROCEDURE — 93283 PRGRMG EVAL IMPLANTABLE DFB: CPT

## 2023-04-11 ENCOUNTER — OUTPATIENT (OUTPATIENT)
Dept: OUTPATIENT SERVICES | Facility: HOSPITAL | Age: 73
LOS: 1 days | End: 2023-04-11
Payer: MEDICARE

## 2023-04-11 ENCOUNTER — APPOINTMENT (OUTPATIENT)
Dept: CT IMAGING | Facility: CLINIC | Age: 73
End: 2023-04-11
Payer: MEDICARE

## 2023-04-11 DIAGNOSIS — Z98.890 OTHER SPECIFIED POSTPROCEDURAL STATES: Chronic | ICD-10-CM

## 2023-04-11 DIAGNOSIS — Z95.1 PRESENCE OF AORTOCORONARY BYPASS GRAFT: Chronic | ICD-10-CM

## 2023-04-11 DIAGNOSIS — R59.0 LOCALIZED ENLARGED LYMPH NODES: ICD-10-CM

## 2023-04-11 DIAGNOSIS — Z90.89 ACQUIRED ABSENCE OF OTHER ORGANS: Chronic | ICD-10-CM

## 2023-04-11 DIAGNOSIS — Z95.2 PRESENCE OF PROSTHETIC HEART VALVE: Chronic | ICD-10-CM

## 2023-04-11 PROCEDURE — 71250 CT THORAX DX C-: CPT

## 2023-04-11 PROCEDURE — 71250 CT THORAX DX C-: CPT | Mod: 26,MH

## 2023-04-21 ENCOUNTER — APPOINTMENT (OUTPATIENT)
Dept: THORACIC SURGERY | Facility: CLINIC | Age: 73
End: 2023-04-21
Payer: MEDICARE

## 2023-04-21 VITALS
DIASTOLIC BLOOD PRESSURE: 76 MMHG | OXYGEN SATURATION: 98 % | BODY MASS INDEX: 27.85 KG/M2 | HEIGHT: 69 IN | WEIGHT: 188 LBS | HEART RATE: 59 BPM | SYSTOLIC BLOOD PRESSURE: 132 MMHG

## 2023-04-21 PROCEDURE — 99214 OFFICE O/P EST MOD 30 MIN: CPT

## 2023-04-21 NOTE — REVIEW OF SYSTEMS
[Loss Of Hearing] : hearing loss [As Noted in HPI] : as noted in HPI [Sore Throat] : no sore throat [Hoarseness] : no hoarseness [Chest Pain] : no chest pain [Palpitations] : no palpitations [Lower Ext Edema] : no extremity edema [FreeTextEntry4] : left side hearing difficulties,

## 2023-04-21 NOTE — ASSESSMENT
[FreeTextEntry1] : 72 y M with asthma, CAD s/p remote CABG and AVR mechanical, on Warfarin, heart failure being followed by Dr. Duarte ([phone]) at [place] as well as Dr. Yancey ([phone]); exposed to Agent Orange in [place] war, presenting with worseing weston over last couple years. [place] ago as well. Presenting with mediastinal LAD.\par \par Plan\par bronch EBUS mLN biopsy tentatively 5/2/23\par cardiac clearance \par hold Warfarin 5 days prior to procedure, bridge with lovenox defer to cardiology\par

## 2023-04-21 NOTE — DATA REVIEWED
[FreeTextEntry1] : CT Chest 4/11/23 : 4mm RUL GGO, No mediastinal LAD noted\par CT Chest 6/11/21: Lunggs clear 4mm nodule RUL, No adenopathy\par CT PET 9/30/22:  Cardiac PET/CT scan performed on 9/30/2022 is reviewed.  Several FDG avid lymph nodes are noted in the mediastinum and hilar regions as well. There is concern that this may represent sarcoidosis. The area of distribution corresponds to the area of late gadolinium enhancement seen on the cardiac MRI. 4mm RUL nodule unchanged, JULIÁN granuloma unchanged

## 2023-04-21 NOTE — PHYSICAL EXAM
[Sclera] : the sclera and conjunctiva were normal [PERRL With Normal Accommodation] : pupils were equal in size, round, and reactive to light [Extraocular Movements] : extraocular movements were intact [Neck Appearance] : the appearance of the neck was normal [Neck Cervical Mass (___cm)] : no neck mass was observed [Jugular Venous Distention Increased] : there was no jugular-venous distention [] : no respiratory distress [Respiration, Rhythm And Depth] : normal respiratory rhythm and effort [Auscultation Breath Sounds / Voice Sounds] : lungs were clear to auscultation bilaterally [Bowel Sounds] : normal bowel sounds [Abdomen Soft] : soft [Abdomen Tenderness] : non-tender [Cervical Lymph Nodes Enlarged Posterior Bilaterally] : posterior cervical [Supraclavicular Lymph Nodes Enlarged Bilaterally] : supraclavicular [Cervical Lymph Nodes Enlarged Anterior Bilaterally] : anterior cervical [Axillary Lymph Nodes Enlarged Bilaterally] : axillary [Femoral Lymph Nodes Enlarged Bilaterally] : femoral [Inguinal Lymph Nodes Enlarged Bilaterally] : inguinal [Deep Tendon Reflexes (DTR)] : deep tendon reflexes were 2+ and symmetric [Sensation] : the sensory exam was normal to light touch and pinprick [No Focal Deficits] : no focal deficits [FreeTextEntry1] : rrr, sysotilic murmur

## 2023-04-21 NOTE — HISTORY OF PRESENT ILLNESS
[Hypertension] : Hypertension [No angina, No symptoms] : No symptoms of [Prior Heart Failure] : Prior Heart Failure [FreeTextEntry1] : Mr. Farooq is a 72 year old gentlemen with a pmhx: of Asthma referred by Dr. Herrera for Mediastinal lymphadenopathy.\par He recently been evaluated for cardiac amyloidosis with a Cardiac MRI and PET in June and Sept of 2022.\par The patient also had recently been evaluated by GI, Dr. Snyder for avidity seen in the distal esophagus on a the PET scan. Also noted several FDG avid lymph nodes are noted in the mediastinum and hilar regions as well. There is concern that this may represent sarcoidosis. The area of distribution corresponds to the area of late gadolinium enhancement seen on the cardiac MRI. \par The patient had also been followed for a RUL 4mm nodule unchanged from 6/20/21\par \par Currently doing well, sx stable, continues to have weston. Denies any cough, hemoptysis, weight loss, night sweats, f/c/v/n/d\par denies any joint pain or stiffness, mucosal ulcerations, rashes\par \par extensive exposure to Agent Orange [place] War

## 2023-04-28 ENCOUNTER — APPOINTMENT (OUTPATIENT)
Dept: INTERNAL MEDICINE | Facility: CLINIC | Age: 73
End: 2023-04-28
Payer: MEDICARE

## 2023-04-28 VITALS
OXYGEN SATURATION: 98 % | BODY MASS INDEX: 27.85 KG/M2 | HEIGHT: 69 IN | TEMPERATURE: 97.7 F | WEIGHT: 188 LBS | HEART RATE: 61 BPM | RESPIRATION RATE: 18 BRPM | SYSTOLIC BLOOD PRESSURE: 141 MMHG | DIASTOLIC BLOOD PRESSURE: 85 MMHG

## 2023-04-28 DIAGNOSIS — Z01.818 ENCOUNTER FOR OTHER PREPROCEDURAL EXAMINATION: ICD-10-CM

## 2023-04-28 DIAGNOSIS — R59.0 LOCALIZED ENLARGED LYMPH NODES: ICD-10-CM

## 2023-04-28 PROCEDURE — 99215 OFFICE O/P EST HI 40 MIN: CPT | Mod: 25

## 2023-04-28 PROCEDURE — ZZZZZ: CPT

## 2023-04-28 PROCEDURE — 94727 GAS DIL/WSHOT DETER LNG VOL: CPT

## 2023-04-28 PROCEDURE — 94729 DIFFUSING CAPACITY: CPT

## 2023-04-28 PROCEDURE — 94060 EVALUATION OF WHEEZING: CPT

## 2023-05-02 LAB
ANION GAP SERPL CALC-SCNC: 11 MMOL/L
BUN SERPL-MCNC: 20 MG/DL
CALCIUM SERPL-MCNC: 9.5 MG/DL
CHLORIDE SERPL-SCNC: 101 MMOL/L
CO2 SERPL-SCNC: 25 MMOL/L
CREAT SERPL-MCNC: 1.39 MG/DL
EGFR: 54 ML/MIN/1.73M2
GLUCOSE SERPL-MCNC: 99 MG/DL
HCT VFR BLD CALC: 39.3 %
HGB BLD-MCNC: 13.3 G/DL
MCHC RBC-ENTMCNC: 33.5 PG
MCHC RBC-ENTMCNC: 33.8 GM/DL
MCV RBC AUTO: 99 FL
PLATELET # BLD AUTO: 154 K/UL
POTASSIUM SERPL-SCNC: 5 MMOL/L
RBC # BLD: 3.97 M/UL
RBC # FLD: 15.2 %
SODIUM SERPL-SCNC: 137 MMOL/L
WBC # FLD AUTO: 4.32 K/UL

## 2023-05-02 NOTE — ADDENDUM
[FreeTextEntry1] : I, Rishi Martinez, documented this note as a scribe on behalf of Dr. Nav Herrera MD on 04/28/2023.

## 2023-05-02 NOTE — PHYSICAL EXAM
[General Appearance - Alert] : alert [General Appearance - In No Acute Distress] : in no acute distress [Outer Ear] : the ears and nose were normal in appearance [Oropharynx] : the oropharynx was normal [Neck Appearance] : the appearance of the neck was normal [Neck Cervical Mass (___cm)] : no neck mass was observed [Jugular Venous Distention Increased] : there was no jugular-venous distention [Thyroid Diffuse Enlargement] : the thyroid was not enlarged [Thyroid Nodule] : there were no palpable thyroid nodules [Apical Impulse] : the apical impulse was normal [Heart Sounds] : normal S1 and S2 [Arterial Pulses Carotid] : carotid pulses were normal with no bruits [Edema] : there was no peripheral edema [Bowel Sounds] : normal bowel sounds [Abdomen Soft] : soft [Abdomen Tenderness] : non-tender [] : no hepato-splenomegaly [Abdomen Mass (___ Cm)] : no abdominal mass palpated [Cervical Lymph Nodes Enlarged Posterior Bilaterally] : posterior cervical [Cervical Lymph Nodes Enlarged Anterior Bilaterally] : anterior cervical [Supraclavicular Lymph Nodes Enlarged Bilaterally] : supraclavicular [No CVA Tenderness] : no ~M costovertebral angle tenderness [Nail Clubbing] : no clubbing  or cyanosis of the fingernails [No Rash] : no rash [Coordination Grossly Intact] : coordination grossly intact [Normal Gait] : normal gait [Normal Affect] : the affect was normal [Normal Insight/Judgement] : insight and judgment were intact [FreeTextEntry1] : There is a well-healed scar in the lumbosacral region

## 2023-05-02 NOTE — DATA REVIEWED
[FreeTextEntry1] : A pulmonary function test is performed.  Lung volumes are within normal limits.  Lung mechanics reveal a mild decrease in flow rates.  Slight bronchodilator reactivity is demonstrated.  The DLCO and saturation are maintained.  This represents a mild degree of obstruction with airway reactivity.  When compared to the prior study, the FEV1 has increased from 2.69 L, to 2.97 L.  The total lung capacity has increased from 6.69 L to 6.81 L.  The DLCO, however, has decreased slightly and raw terms from 20.83, to 18.69.\par \par Preoperative blood work, including a CBC and basic metabolic panel is reviewed.\par \par EKG reveals sinus rhythm at a rate of 61.  Normal intervals and axis.  There are no acute ST-T wave changes noted

## 2023-05-02 NOTE — HISTORY OF PRESENT ILLNESS
[FreeTextEntry1] : Patient presents for an routine follow-up visit, and preoperative medical clearance..  [de-identified] : The patient feels relatively well at this time. He has a history of mild persistent asthma and is maintained on Symbicort 160-4.5 mcg/act taking 2 puffs BID.  He was started on this medication in June 2022, when he presented with shortness of breath with exertion.  It was felt that his breathlessness was possibly due to the underlying obstructive lung disease that was documented.  He did improve with the use of this medication, and he states that he is now basically plateaued.  He denies any cough, wheeze or worsening SOB. He denies any recent exacerbations. He is not bring up at phlegm. \par \par He has a history of HLD and is maintained on Repatha 140mg/ml. He has a history of HTN and is maintained on losartan 100 mg once per day, metoprolol tartrate 100 mg BID, and amlodipine 5 mg once per day. He has a history of paroxysmal atrial fibrillation and is maintained on warfarin 3 mg and 5 mg taking one per day based on his weekly INR. \par \par The patient underwent a stress test, and he was noted to have nonsustained ventricular tachycardia.  He then underwent a further work-up, including an MRI of the heart and a cardiac PET scan..  An abnormality was noted, and there is concern that he may have cardiac sarcoidosis.  He is scheduled to undergo a endoscopic bronchial ultrasound to biopsy the abnormal mediastinal lymph nodes.  They were noted to be PET avid, but they were not noted to be significantly enlarged on a recent chest CT scan.  He denies any chest pain or palpitations. He denies any fevers, chills or night sweats.  The study is scheduled for 5/4/2023.\par \par He has a history of GERD and is maintained on pantoprazole 40 mg BID. He denies any reflux.  \par \par He sees Dr. Dobson in urology every 6 months and denies any urinary frequency or blood in the urine. The patient denies any other constitutional symptoms at this time. He now comes in for this assessment.

## 2023-05-02 NOTE — PLAN
[FreeTextEntry1] : 1. Continue current medications as previously outlined. \par \par 2. Follow-up with cardiology for management of paroxysmal atrial fibrillation.\par \par 3. Follow-up with Dr. Dobson in urology every 6 months. \par \par 4. Patient is scheduled to undergo a endoscopic bronchial ultrasound on 5/4/2023, by Dr Soni.  We will be looking for noncaseating granulomas, to possibly make a diagnosis of sarcoidosis.  Further recommendations will be made after the pathology is obtained.\par \par 5.  The patient will undergo a basic metabolic panel and CBC on 5/1/2023.  His last dose of Coumadin, will be on 4/30/2023.  Of note is the fact that he will need to have his PT/INR checked upon admission to the ambulatory surgery unit on 5/4/2023.\par \par 6. Follow-up in 6 months for wellness visit with routine fasting blood work along with a serum angiotensin converting level and a sedimentation rate.\par \par 7.  The patient will follow-up with his cardiologist, Dr. Duarte, at [facility] postprocedure as well.\par \par Addendum: There are no absolute contraindications to the preplanned procedure.  The patient is medically optimized at this time.

## 2023-05-03 ENCOUNTER — OUTPATIENT (OUTPATIENT)
Dept: OUTPATIENT SERVICES | Facility: HOSPITAL | Age: 73
LOS: 1 days | End: 2023-05-03
Payer: MEDICARE

## 2023-05-03 VITALS
SYSTOLIC BLOOD PRESSURE: 136 MMHG | HEART RATE: 64 BPM | HEIGHT: 69 IN | WEIGHT: 187.61 LBS | OXYGEN SATURATION: 99 % | TEMPERATURE: 97 F | RESPIRATION RATE: 16 BRPM | DIASTOLIC BLOOD PRESSURE: 71 MMHG

## 2023-05-03 DIAGNOSIS — Z95.1 PRESENCE OF AORTOCORONARY BYPASS GRAFT: Chronic | ICD-10-CM

## 2023-05-03 DIAGNOSIS — Z98.890 OTHER SPECIFIED POSTPROCEDURAL STATES: Chronic | ICD-10-CM

## 2023-05-03 DIAGNOSIS — Z90.89 ACQUIRED ABSENCE OF OTHER ORGANS: Chronic | ICD-10-CM

## 2023-05-03 DIAGNOSIS — Z95.2 PRESENCE OF PROSTHETIC HEART VALVE: Chronic | ICD-10-CM

## 2023-05-03 DIAGNOSIS — Z95.810 PRESENCE OF AUTOMATIC (IMPLANTABLE) CARDIAC DEFIBRILLATOR: Chronic | ICD-10-CM

## 2023-05-03 DIAGNOSIS — Z01.818 ENCOUNTER FOR OTHER PREPROCEDURAL EXAMINATION: ICD-10-CM

## 2023-05-03 LAB
ADD ON TEST-SPECIMEN IN LAB: SIGNIFICANT CHANGE UP
ALBUMIN SERPL ELPH-MCNC: 3.9 G/DL — SIGNIFICANT CHANGE UP (ref 3.3–5)
ALP SERPL-CCNC: 70 U/L — SIGNIFICANT CHANGE UP (ref 40–120)
ALT FLD-CCNC: 42 U/L — SIGNIFICANT CHANGE UP (ref 12–78)
AST SERPL-CCNC: 37 U/L — SIGNIFICANT CHANGE UP (ref 15–37)
BASOPHILS # BLD AUTO: 0.01 K/UL — SIGNIFICANT CHANGE UP (ref 0–0.2)
BASOPHILS NFR BLD AUTO: 0.3 % — SIGNIFICANT CHANGE UP (ref 0–2)
BILIRUB DIRECT SERPL-MCNC: 0.3 MG/DL — SIGNIFICANT CHANGE UP (ref 0–0.3)
BILIRUB INDIRECT FLD-MCNC: 0.8 MG/DL — SIGNIFICANT CHANGE UP (ref 0.2–1)
BILIRUB SERPL-MCNC: 1.1 MG/DL — SIGNIFICANT CHANGE UP (ref 0.2–1.2)
BUN SERPL-MCNC: 20 MG/DL — SIGNIFICANT CHANGE UP (ref 7–23)
CALCIUM SERPL-MCNC: 9 MG/DL — SIGNIFICANT CHANGE UP (ref 8.5–10.1)
CHLORIDE SERPL-SCNC: 108 MMOL/L — SIGNIFICANT CHANGE UP (ref 96–108)
CO2 SERPL-SCNC: 28 MMOL/L — SIGNIFICANT CHANGE UP (ref 22–31)
CREAT SERPL-MCNC: 1.28 MG/DL — SIGNIFICANT CHANGE UP (ref 0.5–1.3)
EGFR: 59 ML/MIN/1.73M2 — LOW
EOSINOPHIL # BLD AUTO: 0.03 K/UL — SIGNIFICANT CHANGE UP (ref 0–0.5)
EOSINOPHIL NFR BLD AUTO: 0.8 % — SIGNIFICANT CHANGE UP (ref 0–6)
GLUCOSE SERPL-MCNC: 88 MG/DL — SIGNIFICANT CHANGE UP (ref 70–99)
HCT VFR BLD CALC: 38.2 % — LOW (ref 39–50)
HGB BLD-MCNC: 13.5 G/DL — SIGNIFICANT CHANGE UP (ref 13–17)
IMM GRANULOCYTES NFR BLD AUTO: 0.3 % — SIGNIFICANT CHANGE UP (ref 0–0.9)
INR BLD: 1.52 RATIO — HIGH (ref 0.88–1.16)
LYMPHOCYTES # BLD AUTO: 1.38 K/UL — SIGNIFICANT CHANGE UP (ref 1–3.3)
LYMPHOCYTES # BLD AUTO: 35 % — SIGNIFICANT CHANGE UP (ref 13–44)
MCHC RBC-ENTMCNC: 33.9 PG — SIGNIFICANT CHANGE UP (ref 27–34)
MCHC RBC-ENTMCNC: 35.3 GM/DL — SIGNIFICANT CHANGE UP (ref 32–36)
MCV RBC AUTO: 96 FL — SIGNIFICANT CHANGE UP (ref 80–100)
MONOCYTES # BLD AUTO: 0.47 K/UL — SIGNIFICANT CHANGE UP (ref 0–0.9)
MONOCYTES NFR BLD AUTO: 11.9 % — SIGNIFICANT CHANGE UP (ref 2–14)
NEUTROPHILS # BLD AUTO: 2.04 K/UL — SIGNIFICANT CHANGE UP (ref 1.8–7.4)
NEUTROPHILS NFR BLD AUTO: 51.7 % — SIGNIFICANT CHANGE UP (ref 43–77)
PLATELET # BLD AUTO: 155 K/UL — SIGNIFICANT CHANGE UP (ref 150–400)
POTASSIUM SERPL-MCNC: 4.3 MMOL/L — SIGNIFICANT CHANGE UP (ref 3.5–5.3)
POTASSIUM SERPL-SCNC: 4.3 MMOL/L — SIGNIFICANT CHANGE UP (ref 3.5–5.3)
PROT SERPL-MCNC: 7.7 GM/DL — SIGNIFICANT CHANGE UP (ref 6–8.3)
PROTHROM AB SERPL-ACNC: 17.7 SEC — HIGH (ref 10.5–13.4)
RBC # BLD: 3.98 M/UL — LOW (ref 4.2–5.8)
RBC # FLD: 14 % — SIGNIFICANT CHANGE UP (ref 10.3–14.5)
SODIUM SERPL-SCNC: 136 MMOL/L — SIGNIFICANT CHANGE UP (ref 135–145)
WBC # BLD: 3.94 K/UL — SIGNIFICANT CHANGE UP (ref 3.8–10.5)
WBC # FLD AUTO: 3.94 K/UL — SIGNIFICANT CHANGE UP (ref 3.8–10.5)

## 2023-05-03 PROCEDURE — 85610 PROTHROMBIN TIME: CPT

## 2023-05-03 PROCEDURE — 85025 COMPLETE CBC W/AUTO DIFF WBC: CPT

## 2023-05-03 PROCEDURE — 80076 HEPATIC FUNCTION PANEL: CPT

## 2023-05-03 PROCEDURE — 36415 COLL VENOUS BLD VENIPUNCTURE: CPT

## 2023-05-03 PROCEDURE — 99214 OFFICE O/P EST MOD 30 MIN: CPT | Mod: 25

## 2023-05-03 PROCEDURE — 93010 ELECTROCARDIOGRAM REPORT: CPT

## 2023-05-03 PROCEDURE — 80048 BASIC METABOLIC PNL TOTAL CA: CPT

## 2023-05-03 PROCEDURE — 93005 ELECTROCARDIOGRAM TRACING: CPT

## 2023-05-03 RX ORDER — SODIUM CHLORIDE 9 MG/ML
1000 INJECTION, SOLUTION INTRAVENOUS
Refills: 0 | Status: DISCONTINUED | OUTPATIENT
Start: 2023-05-04 | End: 2023-05-04

## 2023-05-03 RX ORDER — FENTANYL CITRATE 50 UG/ML
50 INJECTION INTRAVENOUS
Refills: 0 | Status: DISCONTINUED | OUTPATIENT
Start: 2023-05-04 | End: 2023-05-04

## 2023-05-03 RX ORDER — ONDANSETRON 8 MG/1
4 TABLET, FILM COATED ORAL EVERY 6 HOURS
Refills: 0 | Status: DISCONTINUED | OUTPATIENT
Start: 2023-05-04 | End: 2023-05-04

## 2023-05-03 RX ORDER — OXYCODONE HYDROCHLORIDE 5 MG/1
5 TABLET ORAL ONCE
Refills: 0 | Status: DISCONTINUED | OUTPATIENT
Start: 2023-05-04 | End: 2023-05-04

## 2023-05-03 RX ORDER — WARFARIN SODIUM 2.5 MG/1
2 TABLET ORAL
Qty: 0 | Refills: 0 | DISCHARGE

## 2023-05-03 NOTE — H&P PST ADULT - WAS YOUR LAST COVID-19 VACCINE GREATER THAN OR EQUAL TO TWO MONTHS AGO?
"Outpatient Speech Language Pathology   Adult Speech Language Cognitive Progress Note  [facility]     Patient Name: Mauricio Barber  : 1949  MRN: [id number]  Today's Date: 4/3/2018         Visit Date: 2018   Patient Active Problem List   Diagnosis   • LOM (left otitis media)   • Minimal cognitive impairment   • Hypertension   • Chest pain   • Hyperlipidemia   • Abdominal pain   • History of prostate cancer   • Angina at rest   • Amnesia   • Lung nodule   • Mild renal insufficiency   • Diarrhea of presumed infectious origin          Visit Dx:    ICD-10-CM ICD-9-CM   1. Memory loss R41.3 780.93   2. Cognitive impairment R41.89 294.9                               SLP OP Goals     Row Name 18 0800          Subjective Comments    Subjective Comments Pt.reports that he is feeling well beside the \"pinholes that showed up in my retinas\".  -AM        Subjective Pain    Able to rate subjective pain? yes  -AM     Pre-Treatment Pain Level 0  -AM     Post-Treatment Pain Level 0  -AM        Other Goals    Other Adult Goal- 1 LTG 1: Pt. will demonstrate improvment in cognitive communication skills with use of compensatory strategies.  -AM     Status: Other Adult Goal- 1 Progressing as expected  -AM     Comments: Other Adult Goal- 1 3/13: Completed initial evaluation for organization, reasoning, and executive functioning. New STGs added.   -AM     Other Adult Goal- 2 STG 1: Pt. will immediately recall/repeat 4 unrelated words with 80% accuracy and one repetition PRN.   -AM     Status: Other Adult Goal- 2 Progress slower than expected  -AM     Comments: Other Adult Goal- 2 4/3: Immediate recall of 4 unrelated words: 80% with intermittent cues. 3/26: Revised goal from 5 unrelated words to 4 unrelated words. Immediate recall of 5 unrelated words: 0% with repetitions and cues. Immediate recall of 4 unrelated words: 50% with intermittent cues.   -AM     Other Adult Goal- 3 STG 2: Pt. will recall 3 related words post " 3 min with 80% accuracy and category cues.  -AM     Status: Other Adult Goal- 3 Progressing as expected  -AM     Comments: Other Adult Goal- 3 3/26: Recall 3 related words post 3 min: 67% with intermittent cues.   -AM     Other Adult Goal- 4 STG 3: Pt. will recall details from 36-50 wd. paragraphs with 80% accuracy and intermittent cues.   -AM     Status: Other Adult Goal- 4 Progressing as expected  -AM     Comments: Other Adult Goal- 4 3/26: Recall details from 36-50 wd. paragraphs: 58% with intermittent cues.  -AM     Other Adult Goal- 5 STG 4: Pt. will complete divergent naming tasks (15 items/concrete category/1 min) with 80% accuracy and intermittent cues.   -AM     Status: Other Adult Goal- 5 Progressing as expected  -AM     Comments: Other Adult Goal- 5 4/3: Divergent naming: 15 items/concrete/1 min: 73% with intermittent cues. Easily distracted by personal stories. 3/13: Divergent naming: 15 items/concrete/1 min: 100% with intermittent cues.   -AM     Other Adult Goal- 6 STG 5: Pt. will complete ongoing assessment of organization, reasoning, and executive functioning.   -AM     Status: Other Adult Goal- 6 Achieved  -AM     Comments: Other Adult Goal- 6 3/13: COMPLETE and STGs added PRN.  -AM     Other Adult Goal- 7 STG 6: Pt. will complete temporal problems with picture with 80% accuracy and intermittent cues.  -AM     Status: Other Adult Goal- 7 Progressing as expected  -AM     Comments: Other Adult Goal- 7 4/3: Temporal Problems with pictures: 60% with intermittent cues. 3/26: Temporal problems with pictures: 60% with intermittent cues. Required extended time to complete each question.   -AM     Other Adult Goal- 8 STG 7: Pt. will complete planning tasks with 80% accuracy and intermittent cues.   -AM     Status: Other Adult Goal- 8 New  -AM     Comments: Other Adult Goal- 8 3/13: STG added  -AM        SLP Time Calculation    SLP Goal Re-Cert Due Date 06/04/18  -AM       User Key  (r) = Recorded By, (t) =  Taken By, (c) = Cosigned By    Initials Name Provider Type    AM ADVID BillSLP Speech and Language Pathologist                OP SLP Education     Row Name 04/03/18 0800       Education    Barriers to Learning Hearing deficit  -AM    Education Provided Patient demonstrated recommended strategies;Patient requires further education on strategies, risks  -AM    Assessed Learning motivation  -AM    Learning Motivation Moderate;Strong  -AM    Learning Method Explanation;Demonstration  -AM    Teaching Response Verbalized understanding;Demonstrated understanding  -AM      User Key  (r) = Recorded By, (t) = Taken By, (c) = Cosigned By    Initials Name Effective Dates    AM VANESSA Bill 04/19/17 -                 OP SLP Assessment/Plan - 04/03/18 0800        SLP Assessment    Functional Problems Speech Language- Adult/Cognition  -AM    Impact on Function: Adult Speech Language/Cognition Trouble learning or remembering new information;Difficulty participating in avocational activities;Difficulty sequencing thoughts to express complex messages  -AM    Clinical Impression: Speech Language-Adult/Congnition Severe:;Cognitive Communication Impairment  -AM    Please refer to paper survey for additional self-reported information Yes  -AM    Please refer to items scanned into chart for additional diagnostic informaiton and handouts as provided by clinician Yes  -AM    Prognosis Good (comment)  -AM    Patient/caregiver participated in establishment of treatment plan and goals Yes  -AM    Patient would benefit from skilled therapy intervention Yes  -AM       SLP Plan    Frequency 1x/weekly  -AM    Duration 8 weeks  -AM    Planned CPT's? SLP INDIVIDUAL SPEECH THERAPY: 34439;SLP DEVEL COG SKILLS (PER 15 MINUTES): 79126  -AM    Plan Comments COntinue with POC  -AM      User Key  (r) = Recorded By, (t) = Taken By, (c) = Cosigned By    Initials Name Provider Type    AM Domonique Johnson CCC-SLP Speech and Language  Pathologist             SLP Outcome Measures (last 72 hours)      SLP Outcome Measures     Row Name 04/03/18 0800             SLP Outcome Measures    Outcome Measure Used? Adult NOMS  -AM         FCM Scores    Memory FCM Score 3  -AM        User Key  (r) = Recorded By, (t) = Taken By, (c) = Cosigned By    Initials Name Effective Dates    AM VANESSA Bill 04/19/17 -              Time Calculation:   SLP Start Time: 0810    Therapy Charges for Today     Code Description Service Date Service Provider Modifiers Qty    72272338192 HC ST MEMORY CURRENT 4/3/2018 VANESSA Bill GN, CL 1    77713920809 HC ST MEMORY PROJECTED 4/3/2018 VANESSA Bill GN, CK 1    19473896468 HC ST DEV OF COGN SKILLS EACH 15 MIN 4/3/2018 VANESSA Bill  4          SLP G-Codes  SLP NOMS Used?: Yes  Functional Limitations: Memory  Memory Current Status (): At least 60 percent but less than 80 percent impaired, limited or restricted  Memory Goal Status (): At least 40 percent but less than 60 percent impaired, limited or restricted        VANESSA Bill  4/3/2018   Yes

## 2023-05-03 NOTE — H&P PST ADULT - NSICDXPASTSURGICALHX_GEN_ALL_CORE_FT
PAST SURGICAL HISTORY:  H/O basal cell carcinoma excision     H/O coronary artery bypass surgery 2006    H/O endoscopy     H/O inguinal hernia repair     H/O mechanical aortic valve replacement     History of squamous cell carcinoma excision     History of transcatheter aortic valve replacement (TAVR)     S/P ICD (internal cardiac defibrillator) procedure     S/P tonsillectomy     Status post dilation of urethral narrowing

## 2023-05-03 NOTE — H&P PST ADULT - NSANTHOSAYNRD_GEN_A_CORE
No. CIHNO screening performed.  STOP BANG Legend: 0-2 = LOW Risk; 3-4 = INTERMEDIATE Risk; 5-8 = HIGH Risk

## 2023-05-03 NOTE — H&P PST ADULT - HISTORY OF PRESENT ILLNESS
72 year old male diagnosed with mediastinal lymphadenopathy; Pt had complained of SOB and cardiac imaging PET scan was done; s/p endoscopy  denies chest pain ;  presents to PST for planned bronchoscopy

## 2023-05-03 NOTE — H&P PST ADULT - NSICDXPASTMEDICALHX_GEN_ALL_CORE_FT
PAST MEDICAL HISTORY:  Asthma     Atrial fibrillation     BPH (benign prostatic hyperplasia)     CAD (coronary artery disease)     Cardiomyopathy     ED (erectile dysfunction)     Exposure to Agent Orange     Frequency of urination     GERD (gastroesophageal reflux disease)     H/O hyperlipidemia     HTN (hypertension)     Mediastinal lymphadenopathy     NSVT (nonsustained ventricular tachycardia)     PAT (paroxysmal atrial tachycardia)     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      PAST MEDICAL HISTORY:  Asthma     Atrial fibrillation     BPH (benign prostatic hyperplasia)     CAD (coronary artery disease)     Cardiomyopathy     Daily consumption of alcohol     ED (erectile dysfunction)     Exposure to Agent Orange     Frequency of urination     GERD (gastroesophageal reflux disease)     H/O hyperlipidemia     HTN (hypertension)     Mediastinal lymphadenopathy     NSVT (nonsustained ventricular tachycardia)     PAT (paroxysmal atrial tachycardia)     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

## 2023-05-03 NOTE — H&P PST ADULT - ASSESSMENT
72 year old male diagnosed with mediastinal lymphadenopathy; Pt had complained of SOB and cardiac imaging PET scan was done; s/p endoscopy  denies chest pain ;  presents to PST for planned bronchoscopy       Plan:  1. PST instructions given ; NPO status/  instructions to be given by ASU   2. Pt instructed to take following meds on day of surgery: Norvasc losartan metoprolol   3. Pt instructed to take routine evening medications unless indicated   4. Stop NSAIDS ( Aspirin Alev Motrin Mobic Diclofenac), herbal supplements , MVI , Vitamin fish oil 7 days prior to surgery  unless   directed by surgeon or cardiologist;   5. Cardiac  Optimization  with Dr Hale   6. Labs EKG  as per surgeon request

## 2023-05-04 ENCOUNTER — TRANSCRIPTION ENCOUNTER (OUTPATIENT)
Age: 73
End: 2023-05-04

## 2023-05-04 ENCOUNTER — OUTPATIENT (OUTPATIENT)
Dept: INPATIENT UNIT | Facility: HOSPITAL | Age: 73
LOS: 1 days | Discharge: HOME | End: 2023-05-04
Payer: MEDICARE

## 2023-05-04 ENCOUNTER — APPOINTMENT (OUTPATIENT)
Dept: THORACIC SURGERY | Facility: HOSPITAL | Age: 73
End: 2023-05-04

## 2023-05-04 ENCOUNTER — RESULT REVIEW (OUTPATIENT)
Age: 73
End: 2023-05-04

## 2023-05-04 VITALS
SYSTOLIC BLOOD PRESSURE: 138 MMHG | HEIGHT: 69 IN | RESPIRATION RATE: 16 BRPM | OXYGEN SATURATION: 100 % | WEIGHT: 184.97 LBS | HEART RATE: 64 BPM | TEMPERATURE: 98 F | DIASTOLIC BLOOD PRESSURE: 93 MMHG

## 2023-05-04 VITALS
DIASTOLIC BLOOD PRESSURE: 83 MMHG | RESPIRATION RATE: 17 BRPM | OXYGEN SATURATION: 97 % | HEART RATE: 60 BPM | SYSTOLIC BLOOD PRESSURE: 138 MMHG | TEMPERATURE: 97 F

## 2023-05-04 DIAGNOSIS — Z90.89 ACQUIRED ABSENCE OF OTHER ORGANS: Chronic | ICD-10-CM

## 2023-05-04 DIAGNOSIS — Z95.1 PRESENCE OF AORTOCORONARY BYPASS GRAFT: Chronic | ICD-10-CM

## 2023-05-04 DIAGNOSIS — Z01.818 ENCOUNTER FOR OTHER PREPROCEDURAL EXAMINATION: ICD-10-CM

## 2023-05-04 DIAGNOSIS — Z95.2 PRESENCE OF PROSTHETIC HEART VALVE: Chronic | ICD-10-CM

## 2023-05-04 DIAGNOSIS — Z98.890 OTHER SPECIFIED POSTPROCEDURAL STATES: Chronic | ICD-10-CM

## 2023-05-04 DIAGNOSIS — R59.0 LOCALIZED ENLARGED LYMPH NODES: ICD-10-CM

## 2023-05-04 DIAGNOSIS — Z95.810 PRESENCE OF AUTOMATIC (IMPLANTABLE) CARDIAC DEFIBRILLATOR: Chronic | ICD-10-CM

## 2023-05-04 LAB
B PERT IGG+IGM PNL SER: CLEAR — SIGNIFICANT CHANGE UP
B PERT IGG+IGM PNL SER: CLEAR — SIGNIFICANT CHANGE UP
COLOR FLD: SIGNIFICANT CHANGE UP
COLOR FLD: SIGNIFICANT CHANGE UP
FLUID INTAKE SUBSTANCE CLASS: SIGNIFICANT CHANGE UP
FLUID INTAKE SUBSTANCE CLASS: SIGNIFICANT CHANGE UP
GRAM STN FLD: SIGNIFICANT CHANGE UP
INR BLD: 1.23 RATIO — HIGH (ref 0.88–1.16)
NEUTROPHILS-BODY FLUID: SIGNIFICANT CHANGE UP
NEUTROPHILS-BODY FLUID: SIGNIFICANT CHANGE UP
NIGHT BLUE STAIN TISS: SIGNIFICANT CHANGE UP
NIGHT BLUE STAIN TISS: SIGNIFICANT CHANGE UP
PROTHROM AB SERPL-ACNC: 14.3 SEC — HIGH (ref 10.5–13.4)
RCV VOL RI: < 2000 /UL — SIGNIFICANT CHANGE UP (ref 0–0)
RCV VOL RI: < 2000 /UL — SIGNIFICANT CHANGE UP (ref 0–0)
SPECIMEN SOURCE: SIGNIFICANT CHANGE UP
TOTAL NUCLEATED CELL COUNT, BODY FLUID: 26 /UL — SIGNIFICANT CHANGE UP
TOTAL NUCLEATED CELL COUNT, BODY FLUID: 33 /UL — SIGNIFICANT CHANGE UP
TUBE TYPE: SIGNIFICANT CHANGE UP
TUBE TYPE: SIGNIFICANT CHANGE UP

## 2023-05-04 PROCEDURE — 87206 SMEAR FLUORESCENT/ACID STAI: CPT | Mod: 59

## 2023-05-04 PROCEDURE — 87070 CULTURE OTHR SPECIMN AEROBIC: CPT

## 2023-05-04 PROCEDURE — 71045 X-RAY EXAM CHEST 1 VIEW: CPT | Mod: 26

## 2023-05-04 PROCEDURE — 85610 PROTHROMBIN TIME: CPT

## 2023-05-04 PROCEDURE — 31624 DX BRONCHOSCOPE/LAVAGE: CPT

## 2023-05-04 PROCEDURE — 87075 CULTR BACTERIA EXCEPT BLOOD: CPT

## 2023-05-04 PROCEDURE — 88305 TISSUE EXAM BY PATHOLOGIST: CPT | Mod: 26

## 2023-05-04 PROCEDURE — 36415 COLL VENOUS BLD VENIPUNCTURE: CPT

## 2023-05-04 PROCEDURE — 87102 FUNGUS ISOLATION CULTURE: CPT

## 2023-05-04 PROCEDURE — 88104 CYTOPATH FL NONGYN SMEARS: CPT

## 2023-05-04 PROCEDURE — 87116 MYCOBACTERIA CULTURE: CPT | Mod: 59

## 2023-05-04 PROCEDURE — 71045 X-RAY EXAM CHEST 1 VIEW: CPT

## 2023-05-04 PROCEDURE — 88305 TISSUE EXAM BY PATHOLOGIST: CPT

## 2023-05-04 PROCEDURE — 88104 CYTOPATH FL NONGYN SMEARS: CPT | Mod: 26

## 2023-05-04 PROCEDURE — 31652 BRONCH EBUS SAMPLNG 1/2 NODE: CPT

## 2023-05-04 PROCEDURE — 87015 SPECIMEN INFECT AGNT CONCNTJ: CPT | Mod: 59

## 2023-05-04 PROCEDURE — 88172 CYTP DX EVAL FNA 1ST EA SITE: CPT | Mod: 59

## 2023-05-04 PROCEDURE — 89051 BODY FLUID CELL COUNT: CPT

## 2023-05-04 RX ORDER — WARFARIN SODIUM 2.5 MG/1
1 TABLET ORAL
Refills: 0 | DISCHARGE

## 2023-05-04 RX ORDER — BUDESONIDE AND FORMOTEROL FUMARATE DIHYDRATE 160; 4.5 UG/1; UG/1
2 AEROSOL RESPIRATORY (INHALATION)
Qty: 0 | Refills: 0 | DISCHARGE

## 2023-05-04 RX ORDER — CHOLECALCIFEROL (VITAMIN D3) 125 MCG
0 CAPSULE ORAL
Refills: 0 | DISCHARGE

## 2023-05-04 RX ORDER — PANTOPRAZOLE SODIUM 20 MG/1
1 TABLET, DELAYED RELEASE ORAL
Refills: 0 | DISCHARGE

## 2023-05-04 RX ORDER — ASPIRIN/CALCIUM CARB/MAGNESIUM 324 MG
1 TABLET ORAL
Qty: 0 | Refills: 0 | DISCHARGE

## 2023-05-04 RX ORDER — METOPROLOL TARTRATE 50 MG
1 TABLET ORAL
Qty: 0 | Refills: 0 | DISCHARGE

## 2023-05-04 RX ORDER — EVOLOCUMAB 140 MG/ML
0 INJECTION, SOLUTION SUBCUTANEOUS
Qty: 0 | Refills: 0 | DISCHARGE

## 2023-05-04 RX ORDER — AMLODIPINE BESYLATE 2.5 MG/1
1 TABLET ORAL
Qty: 0 | Refills: 0 | DISCHARGE

## 2023-05-04 RX ORDER — LOSARTAN POTASSIUM 100 MG/1
1 TABLET, FILM COATED ORAL
Qty: 0 | Refills: 0 | DISCHARGE

## 2023-05-04 NOTE — ASU DISCHARGE PLAN (ADULT/PEDIATRIC) - CARE PROVIDER_API CALL
Carlos Soni)  Surgery  [address] [place] [address]  Phone: [phone]  Fax: [phone]  Follow Up Time:

## 2023-05-04 NOTE — ASU DISCHARGE PLAN (ADULT/PEDIATRIC) - NS MD DC FALL RISK RISK
For information on Fall & Injury Prevention, visit: [email or web address] OR  [email or web address] OR  https://www.cdc.gov/steadi/patient.html

## 2023-05-04 NOTE — ASU PREOP CHECKLIST - SITE MARKED BY ANESTHESIOLOGIST

## 2023-05-04 NOTE — ASU PATIENT PROFILE, ADULT - CENTRAL VENOUS CATHETER
[General Appearance - Well Developed] : well developed [General Appearance - Well Nourished] : well nourished [Normal Appearance] : normal appearance [Well Groomed] : well groomed [General Appearance - In No Acute Distress] : no acute distress [Abdomen Soft] : soft [Abdomen Tenderness] : non-tender [Costovertebral Angle Tenderness] : no ~M costovertebral angle tenderness [Urethral Meatus] : meatus normal [Urinary Bladder Findings] : the bladder was normal on palpation [Scrotum] : the scrotum was normal [Testes Mass (___cm)] : there were no testicular masses [No Prostate Nodules] : no prostate nodules [Edema] : no peripheral edema [] : no respiratory distress [Respiration, Rhythm And Depth] : normal respiratory rhythm and effort [Exaggerated Use Of Accessory Muscles For Inspiration] : no accessory muscle use [Oriented To Time, Place, And Person] : oriented to person, place, and time [Affect] : the affect was normal no [Mood] : the mood was normal [Not Anxious] : not anxious [Normal Station and Gait] : the gait and station were normal for the patient's age [No Focal Deficits] : no focal deficits [No Palpable Adenopathy] : no palpable adenopathy

## 2023-05-05 PROBLEM — I42.9 CARDIOMYOPATHY, UNSPECIFIED: Chronic | Status: ACTIVE | Noted: 2023-05-03

## 2023-05-05 PROBLEM — K21.9 GASTRO-ESOPHAGEAL REFLUX DISEASE WITHOUT ESOPHAGITIS: Chronic | Status: ACTIVE | Noted: 2023-05-03

## 2023-05-05 PROBLEM — R59.0 LOCALIZED ENLARGED LYMPH NODES: Chronic | Status: ACTIVE | Noted: 2023-05-03

## 2023-05-05 PROBLEM — Z78.9 OTHER SPECIFIED HEALTH STATUS: Chronic | Status: ACTIVE | Noted: 2023-05-03

## 2023-05-05 PROBLEM — Z77.098 CONTACT WITH AND (SUSPECTED) EXPOSURE TO OTHER HAZARDOUS, CHIEFLY NONMEDICINAL, CHEMICALS: Chronic | Status: ACTIVE | Noted: 2023-05-03

## 2023-05-05 LAB
NIGHT BLUE STAIN TISS: SIGNIFICANT CHANGE UP
SPECIMEN SOURCE: SIGNIFICANT CHANGE UP
SURGICAL PATHOLOGY STUDY: SIGNIFICANT CHANGE UP

## 2023-05-06 LAB
CULTURE RESULTS: NO GROWTH — SIGNIFICANT CHANGE UP
CULTURE RESULTS: NO GROWTH — SIGNIFICANT CHANGE UP
SPECIMEN SOURCE: SIGNIFICANT CHANGE UP
SPECIMEN SOURCE: SIGNIFICANT CHANGE UP

## 2023-05-08 DIAGNOSIS — N52.9 MALE ERECTILE DYSFUNCTION, UNSPECIFIED: ICD-10-CM

## 2023-05-08 DIAGNOSIS — Z79.01 LONG TERM (CURRENT) USE OF ANTICOAGULANTS: ICD-10-CM

## 2023-05-08 DIAGNOSIS — Z79.82 LONG TERM (CURRENT) USE OF ASPIRIN: ICD-10-CM

## 2023-05-08 DIAGNOSIS — I10 ESSENTIAL (PRIMARY) HYPERTENSION: ICD-10-CM

## 2023-05-08 DIAGNOSIS — K21.9 GASTRO-ESOPHAGEAL REFLUX DISEASE WITHOUT ESOPHAGITIS: ICD-10-CM

## 2023-05-08 DIAGNOSIS — Z95.1 PRESENCE OF AORTOCORONARY BYPASS GRAFT: ICD-10-CM

## 2023-05-08 DIAGNOSIS — R59.0 LOCALIZED ENLARGED LYMPH NODES: ICD-10-CM

## 2023-05-08 DIAGNOSIS — I47.1 SUPRAVENTRICULAR TACHYCARDIA: ICD-10-CM

## 2023-05-08 DIAGNOSIS — Z85.828 PERSONAL HISTORY OF OTHER MALIGNANT NEOPLASM OF SKIN: ICD-10-CM

## 2023-05-08 DIAGNOSIS — J45.909 UNSPECIFIED ASTHMA, UNCOMPLICATED: ICD-10-CM

## 2023-05-08 DIAGNOSIS — E78.5 HYPERLIPIDEMIA, UNSPECIFIED: ICD-10-CM

## 2023-05-08 DIAGNOSIS — Z95.810 PRESENCE OF AUTOMATIC (IMPLANTABLE) CARDIAC DEFIBRILLATOR: ICD-10-CM

## 2023-05-08 DIAGNOSIS — Z95.2 PRESENCE OF PROSTHETIC HEART VALVE: ICD-10-CM

## 2023-05-08 DIAGNOSIS — I38 ENDOCARDITIS, VALVE UNSPECIFIED: ICD-10-CM

## 2023-05-08 DIAGNOSIS — I25.10 ATHEROSCLEROTIC HEART DISEASE OF NATIVE CORONARY ARTERY WITHOUT ANGINA PECTORIS: ICD-10-CM

## 2023-05-08 DIAGNOSIS — R35.0 FREQUENCY OF MICTURITION: ICD-10-CM

## 2023-05-08 DIAGNOSIS — N40.1 BENIGN PROSTATIC HYPERPLASIA WITH LOWER URINARY TRACT SYMPTOMS: ICD-10-CM

## 2023-05-08 DIAGNOSIS — Z87.891 PERSONAL HISTORY OF NICOTINE DEPENDENCE: ICD-10-CM

## 2023-05-09 LAB
CULTURE RESULTS: SIGNIFICANT CHANGE UP
SPECIMEN SOURCE: SIGNIFICANT CHANGE UP

## 2023-05-10 ENCOUNTER — NON-APPOINTMENT (OUTPATIENT)
Age: 73
End: 2023-05-10

## 2023-05-12 ENCOUNTER — NON-APPOINTMENT (OUTPATIENT)
Age: 73
End: 2023-05-12

## 2023-05-18 PROBLEM — Z98.890 S/P BRONCHOSCOPY WITH BIOPSY: Status: ACTIVE | Noted: 2023-05-18

## 2023-05-19 ENCOUNTER — APPOINTMENT (OUTPATIENT)
Dept: THORACIC SURGERY | Facility: CLINIC | Age: 73
End: 2023-05-19
Payer: MEDICARE

## 2023-05-19 ENCOUNTER — RX RENEWAL (OUTPATIENT)
Age: 73
End: 2023-05-19

## 2023-05-19 VITALS
WEIGHT: 185 LBS | DIASTOLIC BLOOD PRESSURE: 80 MMHG | OXYGEN SATURATION: 99 % | BODY MASS INDEX: 27.4 KG/M2 | HEIGHT: 69 IN | SYSTOLIC BLOOD PRESSURE: 154 MMHG | HEART RATE: 61 BPM

## 2023-05-19 DIAGNOSIS — Z98.890 OTHER SPECIFIED POSTPROCEDURAL STATES: ICD-10-CM

## 2023-05-19 PROCEDURE — 99213 OFFICE O/P EST LOW 20 MIN: CPT

## 2023-05-19 NOTE — HISTORY OF PRESENT ILLNESS
[FreeTextEntry1] : Mr. Farooq is a 72 year old gentlemen with a pmhx: of Asthma referred by Dr. Herrera for Mediastinal lymphadenopathy. He underwent an EBUS on 5/4/2023 and returns today for post op visit. \par \par overall doing about the same, continues to have decrease in exercise tolerance and sob, stable since preop\par denies any cough, sputum production, or hemoptysis, denies any cp/fever/chills\par \par discussed biopsy results, negative for any malignancy but non-diagnostic in regards to sarcoidosis\par he is planned for cardiac pet and possible biopsy at [place]

## 2023-05-19 NOTE — CONSULT LETTER
[Dear  ___] : Dear  [unfilled], [Courtesy Letter:] : I had the pleasure of seeing your patient, [unfilled], in my office today. [Please see my note below.] : Please see my note below. [Consult Closing:] : Thank you very much for allowing me to participate in the care of this patient.  If you have any questions, please do not hesitate to contact me. [Sincerely,] : Sincerely, [FreeTextEntry2] : Nav Herrera MD\par [address] [place] [address], [address]\[place] [address]\par [phone] [FreeTextEntry3] : Carlos Soni MD\par Attending Surgeon\par Division of Thoracic Surgery\par [facility]\par [phone]\par \par \par

## 2023-05-19 NOTE — PHYSICAL EXAM
[Sclera] : the sclera and conjunctiva were normal [PERRL With Normal Accommodation] : pupils were equal in size, round, and reactive to light [] : no respiratory distress [Respiration, Rhythm And Depth] : normal respiratory rhythm and effort [Exaggerated Use Of Accessory Muscles For Inspiration] : no accessory muscle use [Auscultation Breath Sounds / Voice Sounds] : lungs were clear to auscultation bilaterally [Apical Impulse] : the apical impulse was normal [Heart Rate And Rhythm] : heart rate was normal and rhythm regular [Heart Sounds] : normal S1 and S2

## 2023-05-19 NOTE — ASSESSMENT
[FreeTextEntry1] : Mr. Farooq is a 72 year old gentlemen with past medical history of Asthma referred by Dr. Herrera for Mediastinal lymphadenopathy. He underwent an EBUS on 5/4/2023 and returns today for post op visit. \par \par Biopsy Results 5/4/2023\par Negative for malignancy\par Benign lymph node tissue with sinus histiocytosis\par Histiocytes contain granular pigment, and rare benign bronchial glandular fragments\par Cytology preparations contain a mixed population of lymphocytes and admixed pigmented histiocytes\par \par I have reviewed the patient's medical records, diagnostic images during the time of this office consultation and have made the following recommendation.\par \par mediastinal LAD s/p EBUS TBFNAB\par negative for any malignancy, non-diagnostic for sarcoid\par continue care per Dr. Herrera, scheduled for cardiac pet and possible biopsy at [place]\par path discussed in detail \par f/u prn

## 2023-05-30 ENCOUNTER — NON-APPOINTMENT (OUTPATIENT)
Age: 73
End: 2023-05-30

## 2023-06-03 LAB
CULTURE RESULTS: SIGNIFICANT CHANGE UP
SPECIMEN SOURCE: SIGNIFICANT CHANGE UP

## 2023-06-19 ENCOUNTER — APPOINTMENT (OUTPATIENT)
Dept: ELECTROPHYSIOLOGY | Facility: CLINIC | Age: 73
End: 2023-06-19
Payer: MEDICARE

## 2023-06-20 ENCOUNTER — NON-APPOINTMENT (OUTPATIENT)
Age: 73
End: 2023-06-20

## 2023-06-20 PROCEDURE — 93295 DEV INTERROG REMOTE 1/2/MLT: CPT

## 2023-06-20 PROCEDURE — 93296 REM INTERROG EVL PM/IDS: CPT

## 2023-06-21 LAB
CULTURE RESULTS: SIGNIFICANT CHANGE UP
SPECIMEN SOURCE: SIGNIFICANT CHANGE UP

## 2023-09-18 ENCOUNTER — APPOINTMENT (OUTPATIENT)
Dept: ELECTROPHYSIOLOGY | Facility: CLINIC | Age: 73
End: 2023-09-18
Payer: MEDICARE

## 2023-09-19 ENCOUNTER — NON-APPOINTMENT (OUTPATIENT)
Age: 73
End: 2023-09-19

## 2023-09-20 PROCEDURE — 93296 REM INTERROG EVL PM/IDS: CPT

## 2023-09-20 PROCEDURE — 93295 DEV INTERROG REMOTE 1/2/MLT: CPT

## 2023-10-17 LAB
25(OH)D3 SERPL-MCNC: 52.3 NG/ML
ALBUMIN SERPL ELPH-MCNC: 4.4 G/DL
ALP BLD-CCNC: 71 U/L
ALT SERPL-CCNC: 22 U/L
ANION GAP SERPL CALC-SCNC: 11 MMOL/L
APPEARANCE: CLEAR
AST SERPL-CCNC: 29 U/L
BACTERIA: NEGATIVE /HPF
BILIRUB SERPL-MCNC: 0.5 MG/DL
BILIRUBIN URINE: NEGATIVE
BLOOD URINE: ABNORMAL
BUN SERPL-MCNC: 19 MG/DL
CALCIUM SERPL-MCNC: 9.2 MG/DL
CAST: 0 /LPF
CHLORIDE SERPL-SCNC: 100 MMOL/L
CHOLEST SERPL-MCNC: 123 MG/DL
CO2 SERPL-SCNC: 23 MMOL/L
COLOR: YELLOW
CREAT SERPL-MCNC: 1.42 MG/DL
EGFR: 52 ML/MIN/1.73M2
EPITHELIAL CELLS: 0 /HPF
ESTIMATED AVERAGE GLUCOSE: 114 MG/DL
GLUCOSE QUALITATIVE U: NEGATIVE MG/DL
GLUCOSE SERPL-MCNC: 100 MG/DL
HBA1C MFR BLD HPLC: 5.6 %
HDLC SERPL-MCNC: 44 MG/DL
KETONES URINE: NEGATIVE MG/DL
LDLC SERPL CALC-MCNC: 60 MG/DL
LEUKOCYTE ESTERASE URINE: ABNORMAL
MICROSCOPIC-UA: NORMAL
NITRITE URINE: NEGATIVE
NONHDLC SERPL-MCNC: 78 MG/DL
PH URINE: 6
POTASSIUM SERPL-SCNC: 4.4 MMOL/L
PROT SERPL-MCNC: 6.5 G/DL
PROTEIN URINE: NEGATIVE MG/DL
PSA SERPL-MCNC: 5.84 NG/ML
RED BLOOD CELLS URINE: 1 /HPF
REVIEW: NORMAL
SODIUM SERPL-SCNC: 134 MMOL/L
SPECIFIC GRAVITY URINE: 1.01
T3FREE SERPL-MCNC: 3.04 PG/ML
T4 FREE SERPL-MCNC: 1.3 NG/DL
TRIGL SERPL-MCNC: 98 MG/DL
TSH SERPL-ACNC: 4.58 UIU/ML
UROBILINOGEN URINE: 0.2 MG/DL
WHITE BLOOD CELLS URINE: 1 /HPF

## 2023-11-16 ENCOUNTER — APPOINTMENT (OUTPATIENT)
Dept: INTERNAL MEDICINE | Facility: CLINIC | Age: 73
End: 2023-11-16
Payer: MEDICARE

## 2023-11-16 VITALS
HEART RATE: 68 BPM | BODY MASS INDEX: 27.99 KG/M2 | WEIGHT: 189 LBS | TEMPERATURE: 98.1 F | OXYGEN SATURATION: 97 % | SYSTOLIC BLOOD PRESSURE: 130 MMHG | DIASTOLIC BLOOD PRESSURE: 78 MMHG | HEIGHT: 69 IN

## 2023-11-16 DIAGNOSIS — Z78.9 OTHER SPECIFIED HEALTH STATUS: ICD-10-CM

## 2023-11-16 DIAGNOSIS — Z00.00 ENCOUNTER FOR GENERAL ADULT MEDICAL EXAMINATION W/OUT ABNORMAL FINDINGS: ICD-10-CM

## 2023-11-16 DIAGNOSIS — Z23 ENCOUNTER FOR IMMUNIZATION: ICD-10-CM

## 2023-11-16 PROCEDURE — 99214 OFFICE O/P EST MOD 30 MIN: CPT | Mod: 25

## 2023-11-16 PROCEDURE — G0439: CPT

## 2023-11-17 ENCOUNTER — NON-APPOINTMENT (OUTPATIENT)
Age: 73
End: 2023-11-17

## 2023-11-17 LAB
ANION GAP SERPL CALC-SCNC: 12 MMOL/L
BUN SERPL-MCNC: 19 MG/DL
CALCIUM SERPL-MCNC: 9.2 MG/DL
CHLORIDE SERPL-SCNC: 103 MMOL/L
CO2 SERPL-SCNC: 24 MMOL/L
CREAT SERPL-MCNC: 1.28 MG/DL
EGFR: 59 ML/MIN/1.73M2
GLUCOSE SERPL-MCNC: 103 MG/DL
HCT VFR BLD CALC: 37.1 %
HGB BLD-MCNC: 12.8 G/DL
MCHC RBC-ENTMCNC: 33.9 PG
MCHC RBC-ENTMCNC: 34.5 GM/DL
MCV RBC AUTO: 98.1 FL
PLATELET # BLD AUTO: 165 K/UL
POTASSIUM SERPL-SCNC: 4.3 MMOL/L
RBC # BLD: 3.78 M/UL
RBC # FLD: 14.6 %
SODIUM SERPL-SCNC: 139 MMOL/L
WBC # FLD AUTO: 4.96 K/UL

## 2023-12-19 ENCOUNTER — NON-APPOINTMENT (OUTPATIENT)
Age: 73
End: 2023-12-19

## 2023-12-19 ENCOUNTER — APPOINTMENT (OUTPATIENT)
Dept: ELECTROPHYSIOLOGY | Facility: CLINIC | Age: 73
End: 2023-12-19
Payer: MEDICARE

## 2023-12-19 VITALS
DIASTOLIC BLOOD PRESSURE: 75 MMHG | HEIGHT: 69 IN | HEART RATE: 59 BPM | WEIGHT: 190 LBS | BODY MASS INDEX: 28.14 KG/M2 | SYSTOLIC BLOOD PRESSURE: 153 MMHG | RESPIRATION RATE: 16 BRPM | OXYGEN SATURATION: 100 %

## 2023-12-19 DIAGNOSIS — Z95.810 PRESENCE OF AUTOMATIC (IMPLANTABLE) CARDIAC DEFIBRILLATOR: ICD-10-CM

## 2023-12-19 DIAGNOSIS — I42.9 CARDIOMYOPATHY, UNSPECIFIED: ICD-10-CM

## 2023-12-19 PROCEDURE — 93283 PRGRMG EVAL IMPLANTABLE DFB: CPT

## 2024-01-04 ENCOUNTER — RX RENEWAL (OUTPATIENT)
Age: 74
End: 2024-01-04

## 2024-01-04 RX ORDER — PANTOPRAZOLE 40 MG/1
40 TABLET, DELAYED RELEASE ORAL
Qty: 180 | Refills: 3 | Status: ACTIVE | COMMUNITY
Start: 2023-03-16 | End: 1900-01-01

## 2024-03-18 ENCOUNTER — APPOINTMENT (OUTPATIENT)
Dept: ELECTROPHYSIOLOGY | Facility: CLINIC | Age: 74
End: 2024-03-18
Payer: MEDICARE

## 2024-03-19 ENCOUNTER — NON-APPOINTMENT (OUTPATIENT)
Age: 74
End: 2024-03-19

## 2024-03-19 PROCEDURE — 93296 REM INTERROG EVL PM/IDS: CPT

## 2024-03-19 PROCEDURE — 93295 DEV INTERROG REMOTE 1/2/MLT: CPT

## 2024-05-13 ENCOUNTER — RX RENEWAL (OUTPATIENT)
Age: 74
End: 2024-05-13

## 2024-05-13 RX ORDER — BUDESONIDE AND FORMOTEROL FUMARATE DIHYDRATE 160; 4.5 UG/1; UG/1
160-4.5 AEROSOL RESPIRATORY (INHALATION)
Qty: 30.6 | Refills: 3 | Status: ACTIVE | COMMUNITY
Start: 2024-05-13 | End: 1900-01-01

## 2024-06-02 ENCOUNTER — NON-APPOINTMENT (OUTPATIENT)
Age: 74
End: 2024-06-02

## 2024-06-12 ENCOUNTER — APPOINTMENT (OUTPATIENT)
Dept: INTERNAL MEDICINE | Facility: CLINIC | Age: 74
End: 2024-06-12
Payer: MEDICARE

## 2024-06-12 VITALS
TEMPERATURE: 99.6 F | HEART RATE: 66 BPM | SYSTOLIC BLOOD PRESSURE: 160 MMHG | HEIGHT: 69 IN | BODY MASS INDEX: 27.25 KG/M2 | RESPIRATION RATE: 16 BRPM | WEIGHT: 184 LBS | DIASTOLIC BLOOD PRESSURE: 80 MMHG | OXYGEN SATURATION: 95 %

## 2024-06-12 DIAGNOSIS — I25.10 ATHEROSCLEROTIC HEART DISEASE OF NATIVE CORONARY ARTERY W/OUT ANGINA PECTORIS: ICD-10-CM

## 2024-06-12 DIAGNOSIS — Z00.00 ENCOUNTER FOR GENERAL ADULT MEDICAL EXAMINATION W/OUT ABNORMAL FINDINGS: ICD-10-CM

## 2024-06-12 DIAGNOSIS — R06.09 OTHER FORMS OF DYSPNEA: ICD-10-CM

## 2024-06-12 DIAGNOSIS — J45.30 MILD PERSISTENT ASTHMA, UNCOMPLICATED: ICD-10-CM

## 2024-06-12 DIAGNOSIS — D72.819 DECREASED WHITE BLOOD CELL COUNT, UNSPECIFIED: ICD-10-CM

## 2024-06-12 DIAGNOSIS — Z95.2 PRESENCE OF PROSTHETIC HEART VALVE: ICD-10-CM

## 2024-06-12 DIAGNOSIS — I10 ESSENTIAL (PRIMARY) HYPERTENSION: ICD-10-CM

## 2024-06-12 DIAGNOSIS — I48.0 PAROXYSMAL ATRIAL FIBRILLATION: ICD-10-CM

## 2024-06-12 DIAGNOSIS — E78.5 HYPERLIPIDEMIA, UNSPECIFIED: ICD-10-CM

## 2024-06-12 DIAGNOSIS — Z87.891 PERSONAL HISTORY OF NICOTINE DEPENDENCE: ICD-10-CM

## 2024-06-12 PROCEDURE — 94060 EVALUATION OF WHEEZING: CPT

## 2024-06-12 PROCEDURE — 99214 OFFICE O/P EST MOD 30 MIN: CPT | Mod: 25

## 2024-06-12 PROCEDURE — 94727 GAS DIL/WSHOT DETER LNG VOL: CPT

## 2024-06-12 PROCEDURE — 94729 DIFFUSING CAPACITY: CPT

## 2024-06-12 PROCEDURE — ZZZZZ: CPT

## 2024-06-12 RX ORDER — AMLODIPINE BESYLATE 5 MG/1
5 TABLET ORAL DAILY
Qty: 1 | Refills: 0 | Status: ACTIVE | COMMUNITY

## 2024-06-12 RX ORDER — BUDESONIDE AND FORMOTEROL FUMARATE DIHYDRATE 160; 4.5 UG/1; UG/1
160-4.5 AEROSOL RESPIRATORY (INHALATION) TWICE DAILY
Qty: 30.6 | Refills: 3 | Status: DISCONTINUED | COMMUNITY
Start: 2022-06-15 | End: 2024-06-12

## 2024-06-12 NOTE — PLAN
[FreeTextEntry1] : 1. Continue current medications as outlined above.   2. Follow up in 6 months with wellness and routine fasting bloodwork.    3. Continue to follow with Dr. Monteiro for monitoring and treatment of cardiac issues. He is set to undergo a repeat coronary angiogram in the near future with Dr. Segal.  Further recommendations will be made if his breathlessness persists, after the cardiac catheterization is performed.  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   4. The Influenza vaccine is recommended in the fall.   5. Cardiovascular exercise as tolerated.

## 2024-06-12 NOTE — ADDENDUM
[FreeTextEntry1] : This note was written by Mary Beth Bhatia on 06/12/2024 acting as a medical scribe for Dr. Nav Herrera MD. All medical entries made by the scribe were at my, Dr. Nav Herrera's, direction and personally dictated by me on 06/12/2024. I have reviewed the chart and agree that the record accurately reflects my personal performance of the history, assessment and plan. I have also personally directed, reviewed, and agreed with the chart.

## 2024-06-12 NOTE — PHYSICAL EXAM
[No Rash] : no rash [Coordination Grossly Intact] : coordination grossly intact [Normal Gait] : normal gait [Normal Affect] : the affect was normal [Normal Insight/Judgement] : insight and judgment were intact [General Appearance - Alert] : alert [General Appearance - In No Acute Distress] : in no acute distress [Outer Ear] : the ears and nose were normal in appearance [Oropharynx] : the oropharynx was normal [Neck Appearance] : the appearance of the neck was normal [Neck Cervical Mass (___cm)] : no neck mass was observed [Jugular Venous Distention Increased] : there was no jugular-venous distention [Thyroid Diffuse Enlargement] : the thyroid was not enlarged [Thyroid Nodule] : there were no palpable thyroid nodules [Apical Impulse] : the apical impulse was normal [Heart Sounds] : normal S1 and S2 [Arterial Pulses Carotid] : carotid pulses were normal with no bruits [Edema] : there was no peripheral edema [Abdomen Soft] : soft [Bowel Sounds] : normal bowel sounds [Abdomen Tenderness] : non-tender [] : no hepato-splenomegaly [Abdomen Mass (___ Cm)] : no abdominal mass palpated [Cervical Lymph Nodes Enlarged Posterior Bilaterally] : posterior cervical [Cervical Lymph Nodes Enlarged Anterior Bilaterally] : anterior cervical [Supraclavicular Lymph Nodes Enlarged Bilaterally] : supraclavicular [No CVA Tenderness] : no ~M costovertebral angle tenderness [Nail Clubbing] : no clubbing  or cyanosis of the fingernails [No Edema] : there was no peripheral edema [No Extremity Clubbing/Cyanosis] : no extremity clubbing/cyanosis [de-identified] : Coarse breath sounds are heard throughout both lung fields.  There is fair to good air entry bilaterally.  There are no overt wheezes.  There are no focal rhonchi. [FreeTextEntry1] : There is a well-healed scar in the lumbosacral region

## 2024-06-12 NOTE — DATA REVIEWED
[FreeTextEntry1] : Pulmonary function test is performed.  Lung volumes reveal a normal total lung capacity and vital capacity.  The RV is mildly reduced the FRC is moderately reduced.  Lung mechanics reveal a mild decrease in flow rates with mild to moderate bronchodilator reactivity demonstrated.  The DLCO and saturation are maintained.  This represents combination of both restrictive and obstructive processes.  Mild airway reactivity is demonstrated.  When compared to the prior study, the flow rates are fairly stable.

## 2024-06-12 NOTE — HISTORY OF PRESENT ILLNESS
[FreeTextEntry1] :  The patient comes in for a routine follow-up evaluation. [de-identified] : The patient is not feeling very well at this time. The patient has been c/o SOB with less exertion and lightheadedness. He reports that his exercise capacity has diminished severely. He follows with his cardiologist, Dr. Monteiro. The patient underwent a cardiac PET scan last week due to worsening symptoms. The PET scan was abnormal, revealing areas of ischemia, and Dr. Thomas recommended a repeat coronary angiogram. This will be done by Dr. Segal in the near future. Of note, the patient is maintained on Losartan 100 MG once daily and Metoprolol 100 MG once daily for treatment of HTN.  He also has a history of Hyperlipidemia and continues to use Repatha 140 MG/ML. Additionally, the patient has a history of Afib for which he is maintained in Warfarin 8 MG daily based on weekly IRN. He reports that he exercises by walking. There has been no chest pain, palpitations, or PND.   The patient has a history of mild persistent asthma for which he is maintained on Symbicort, 2 inhalations BID. There has been no sputum production or wheeze. He does reports that he is coughing. There have been no recent exacerbations of the patient's asthma. The patient has not required the use of a rescue inhaler.   There have been no fevers, chills, or night sweats.  He has been concerned about his significant diminution in his exercise capacity.  He has had much more difficulty with his usual walk including his inclines. There have been no other acute constitutional symptoms. He comes in for this assessment.

## 2024-06-14 ENCOUNTER — APPOINTMENT (OUTPATIENT)
Dept: INTERNAL MEDICINE | Facility: CLINIC | Age: 74
End: 2024-06-14

## 2024-06-17 ENCOUNTER — APPOINTMENT (OUTPATIENT)
Dept: ELECTROPHYSIOLOGY | Facility: CLINIC | Age: 74
End: 2024-06-17
Payer: MEDICARE

## 2024-06-17 ENCOUNTER — NON-APPOINTMENT (OUTPATIENT)
Age: 74
End: 2024-06-17

## 2024-06-18 PROCEDURE — 93295 DEV INTERROG REMOTE 1/2/MLT: CPT

## 2024-06-18 PROCEDURE — 93296 REM INTERROG EVL PM/IDS: CPT

## 2024-07-09 NOTE — PROCEDURE NOTE - NSPROCSEDATIONNOT_GEN_ALL_CORE
Patient identification verified with 2 identifiers.    Location: [facility] Patient Self-Testing Program [phone]    Referring Physician: MAURA DOOLEY CNP  Enrollment/ Re-enrollment date: 12/8/2024   INR Goal: 2.5-3.5  INR monitoring is per [facility] protocol.  Anticoagulation Medication: warfarin  Indication: Aortic Valve Replacement    Subjective   Bleeding signs/symptoms: No    Bruising: No   Major bleeding event: No  Thrombosis signs/symptoms: No  Thromboembolic event: No  Missed doses: No  Extra doses: No  Medication changes: No  Dietary changes: No  Change in health: No  Change in activity: No  Alcohol: No  Other concerns: No    Upcoming Procedures:  Does the Patient Have any upcoming procedures that require interruption in anticoagulation therapy? no  Does the patient require bridging? no      Anticoagulation Summary  As of 7/9/2024      INR goal:  2.5-3.5   TTR:  37.0% (8.8 mo)   INR used for dosing:  3.50 (7/9/2024)   Weekly warfarin total:  33 mg               Assessment/Plan   Therapeutic     1. New dose: no change  Spoke with Daughter, Veronica.  Dosing schedule confirmed.  2. Next INR: 1 week      Education provided to patient during the visit:  Patient instructed to call in interim with questions, concerns and changes.   Patient educated on interactions between medications and warfarin.   Patient educated on dietary consistency in vitamin k consumption.   Patient educated on affects of alcohol consumption while taking warfarin.   Patient educated on signs of bleeding/clotting.   Patient educated on compliance with dosing, follow up appointments, and prescribed plan of care.         Yes

## 2024-07-11 ENCOUNTER — NON-APPOINTMENT (OUTPATIENT)
Age: 74
End: 2024-07-11

## 2024-07-12 ENCOUNTER — APPOINTMENT (OUTPATIENT)
Dept: OTOLARYNGOLOGY | Facility: CLINIC | Age: 74
End: 2024-07-12
Payer: MEDICARE

## 2024-07-12 VITALS — WEIGHT: 184 LBS | HEIGHT: 69 IN | BODY MASS INDEX: 27.25 KG/M2

## 2024-07-12 DIAGNOSIS — H69.93 UNSPECIFIED EUSTACHIAN TUBE DISORDER, BILATERAL: ICD-10-CM

## 2024-07-12 DIAGNOSIS — R05.3 CHRONIC COUGH: ICD-10-CM

## 2024-07-12 DIAGNOSIS — R26.89 OTHER ABNORMALITIES OF GAIT AND MOBILITY: ICD-10-CM

## 2024-07-12 DIAGNOSIS — G45.0 VERTEBRO-BASILAR ARTERY SYNDROME: ICD-10-CM

## 2024-07-12 DIAGNOSIS — H90.3 SENSORINEURAL HEARING LOSS, BILATERAL: ICD-10-CM

## 2024-07-12 DIAGNOSIS — J45.30 MILD PERSISTENT ASTHMA, UNCOMPLICATED: ICD-10-CM

## 2024-07-12 PROCEDURE — 31575 DIAGNOSTIC LARYNGOSCOPY: CPT

## 2024-07-12 PROCEDURE — 92557 COMPREHENSIVE HEARING TEST: CPT

## 2024-07-12 PROCEDURE — 92567 TYMPANOMETRY: CPT

## 2024-07-12 PROCEDURE — 99204 OFFICE O/P NEW MOD 45 MIN: CPT | Mod: 25

## 2024-07-12 RX ORDER — PREDNISONE 10 MG/1
10 TABLET ORAL
Qty: 18 | Refills: 2 | Status: ACTIVE | COMMUNITY
Start: 2024-07-12 | End: 1900-01-01

## 2024-07-12 RX ORDER — BENZONATATE 200 MG/1
200 CAPSULE ORAL 3 TIMES DAILY
Qty: 60 | Refills: 4 | Status: ACTIVE | COMMUNITY
Start: 2024-07-12 | End: 1900-01-01

## 2024-08-02 ENCOUNTER — APPOINTMENT (OUTPATIENT)
Dept: OTOLARYNGOLOGY | Facility: CLINIC | Age: 74
End: 2024-08-02
Payer: MEDICARE

## 2024-08-02 PROCEDURE — 92537 CALORIC VSTBLR TEST W/REC: CPT

## 2024-08-02 PROCEDURE — 92547 SUPPLEMENTAL ELECTRICAL TEST: CPT

## 2024-08-02 PROCEDURE — 92540 BASIC VESTIBULAR EVALUATION: CPT

## 2024-08-20 ENCOUNTER — TRANSCRIPTION ENCOUNTER (OUTPATIENT)
Age: 74
End: 2024-08-20

## 2024-09-24 ENCOUNTER — APPOINTMENT (OUTPATIENT)
Dept: ELECTROPHYSIOLOGY | Facility: CLINIC | Age: 74
End: 2024-09-24
Payer: MEDICARE

## 2024-09-24 ENCOUNTER — NON-APPOINTMENT (OUTPATIENT)
Age: 74
End: 2024-09-24

## 2024-09-24 VITALS
BODY MASS INDEX: 27.4 KG/M2 | DIASTOLIC BLOOD PRESSURE: 93 MMHG | OXYGEN SATURATION: 100 % | SYSTOLIC BLOOD PRESSURE: 157 MMHG | HEIGHT: 69 IN | WEIGHT: 185 LBS | HEART RATE: 64 BPM

## 2024-09-24 PROCEDURE — 93283 PRGRMG EVAL IMPLANTABLE DFB: CPT

## 2024-09-24 PROCEDURE — 93290 INTERROG DEV EVAL ICPMS IP: CPT

## 2024-11-08 ENCOUNTER — NON-APPOINTMENT (OUTPATIENT)
Age: 74
End: 2024-11-08

## 2024-11-11 ENCOUNTER — NON-APPOINTMENT (OUTPATIENT)
Age: 74
End: 2024-11-11

## 2024-11-18 ENCOUNTER — APPOINTMENT (OUTPATIENT)
Dept: INTERNAL MEDICINE | Facility: CLINIC | Age: 74
End: 2024-11-18
Payer: MEDICARE

## 2024-11-18 VITALS
SYSTOLIC BLOOD PRESSURE: 150 MMHG | WEIGHT: 190 LBS | HEART RATE: 62 BPM | HEIGHT: 69 IN | BODY MASS INDEX: 28.14 KG/M2 | TEMPERATURE: 98.6 F | OXYGEN SATURATION: 97 % | RESPIRATION RATE: 16 BRPM | DIASTOLIC BLOOD PRESSURE: 78 MMHG

## 2024-11-18 DIAGNOSIS — Z87.891 PERSONAL HISTORY OF NICOTINE DEPENDENCE: ICD-10-CM

## 2024-11-18 DIAGNOSIS — Z00.00 ENCOUNTER FOR GENERAL ADULT MEDICAL EXAMINATION W/OUT ABNORMAL FINDINGS: ICD-10-CM

## 2024-11-18 DIAGNOSIS — F17.200 NICOTINE DEPENDENCE, UNSPECIFIED, UNCOMPLICATED: ICD-10-CM

## 2024-11-18 DIAGNOSIS — R91.8 OTHER NONSPECIFIC ABNORMAL FINDING OF LUNG FIELD: ICD-10-CM

## 2024-11-18 PROCEDURE — G0439: CPT

## 2024-11-18 RX ORDER — FLUOCINONIDE 0.5 MG/ML
0.05 SOLUTION TOPICAL
Qty: 20 | Refills: 0 | Status: ACTIVE | COMMUNITY
Start: 2024-11-04

## 2024-12-24 ENCOUNTER — APPOINTMENT (OUTPATIENT)
Dept: ELECTROPHYSIOLOGY | Facility: CLINIC | Age: 74
End: 2024-12-24
Payer: MEDICARE

## 2024-12-24 ENCOUNTER — NON-APPOINTMENT (OUTPATIENT)
Age: 74
End: 2024-12-24

## 2024-12-24 PROBLEM — F10.90 ALCOHOL USE: Status: ACTIVE | Noted: 2017-02-21

## 2024-12-24 PROCEDURE — 93295 DEV INTERROG REMOTE 1/2/MLT: CPT

## 2024-12-24 PROCEDURE — 93296 REM INTERROG EVL PM/IDS: CPT

## 2025-01-21 ENCOUNTER — RX RENEWAL (OUTPATIENT)
Age: 75
End: 2025-01-21

## 2025-01-28 ENCOUNTER — APPOINTMENT (OUTPATIENT)
Dept: ELECTROPHYSIOLOGY | Facility: CLINIC | Age: 75
End: 2025-01-28
Payer: MEDICARE

## 2025-01-28 ENCOUNTER — NON-APPOINTMENT (OUTPATIENT)
Age: 75
End: 2025-01-28

## 2025-01-28 PROCEDURE — 93296 REM INTERROG EVL PM/IDS: CPT

## 2025-01-28 PROCEDURE — 93295 DEV INTERROG REMOTE 1/2/MLT: CPT

## 2025-04-29 ENCOUNTER — NON-APPOINTMENT (OUTPATIENT)
Age: 75
End: 2025-04-29

## 2025-04-29 ENCOUNTER — APPOINTMENT (OUTPATIENT)
Dept: ELECTROPHYSIOLOGY | Facility: CLINIC | Age: 75
End: 2025-04-29
Payer: MEDICARE

## 2025-04-29 PROCEDURE — 93295 DEV INTERROG REMOTE 1/2/MLT: CPT

## 2025-04-29 PROCEDURE — 93296 REM INTERROG EVL PM/IDS: CPT

## 2025-05-07 ENCOUNTER — RX RENEWAL (OUTPATIENT)
Age: 75
End: 2025-05-07

## 2025-05-14 ENCOUNTER — OUTPATIENT (OUTPATIENT)
Dept: OUTPATIENT SERVICES | Facility: HOSPITAL | Age: 75
LOS: 1 days | End: 2025-05-14
Payer: MEDICARE

## 2025-05-14 DIAGNOSIS — Z95.810 PRESENCE OF AUTOMATIC (IMPLANTABLE) CARDIAC DEFIBRILLATOR: Chronic | ICD-10-CM

## 2025-05-14 DIAGNOSIS — Z95.1 PRESENCE OF AORTOCORONARY BYPASS GRAFT: Chronic | ICD-10-CM

## 2025-05-14 DIAGNOSIS — Z98.890 OTHER SPECIFIED POSTPROCEDURAL STATES: Chronic | ICD-10-CM

## 2025-05-14 DIAGNOSIS — R42 DIZZINESS AND GIDDINESS: ICD-10-CM

## 2025-05-14 DIAGNOSIS — Z95.2 PRESENCE OF PROSTHETIC HEART VALVE: Chronic | ICD-10-CM

## 2025-05-14 DIAGNOSIS — R26.89 OTHER ABNORMALITIES OF GAIT AND MOBILITY: ICD-10-CM

## 2025-05-14 DIAGNOSIS — Z90.89 ACQUIRED ABSENCE OF OTHER ORGANS: Chronic | ICD-10-CM

## 2025-05-14 PROCEDURE — 71046 X-RAY EXAM CHEST 2 VIEWS: CPT | Mod: 26

## 2025-05-14 PROCEDURE — 70551 MRI BRAIN STEM W/O DYE: CPT | Mod: 26

## 2025-05-14 PROCEDURE — 71046 X-RAY EXAM CHEST 2 VIEWS: CPT

## 2025-05-14 PROCEDURE — 70551 MRI BRAIN STEM W/O DYE: CPT

## 2025-05-15 DIAGNOSIS — R26.89 OTHER ABNORMALITIES OF GAIT AND MOBILITY: ICD-10-CM

## 2025-05-15 DIAGNOSIS — R42 DIZZINESS AND GIDDINESS: ICD-10-CM

## 2025-06-03 ENCOUNTER — NON-APPOINTMENT (OUTPATIENT)
Age: 75
End: 2025-06-03

## 2025-06-03 ENCOUNTER — APPOINTMENT (OUTPATIENT)
Dept: ELECTROPHYSIOLOGY | Facility: CLINIC | Age: 75
End: 2025-06-03
Payer: MEDICARE

## 2025-06-03 VITALS
OXYGEN SATURATION: 95 % | SYSTOLIC BLOOD PRESSURE: 150 MMHG | DIASTOLIC BLOOD PRESSURE: 83 MMHG | RESPIRATION RATE: 16 BRPM | WEIGHT: 190 LBS | HEART RATE: 73 BPM | HEIGHT: 69 IN | BODY MASS INDEX: 28.14 KG/M2

## 2025-06-03 PROCEDURE — 93283 PRGRMG EVAL IMPLANTABLE DFB: CPT

## 2025-06-03 PROCEDURE — 93290 INTERROG DEV EVAL ICPMS IP: CPT

## 2025-06-13 ENCOUNTER — APPOINTMENT (OUTPATIENT)
Dept: INTERNAL MEDICINE | Facility: CLINIC | Age: 75
End: 2025-06-13
Payer: MEDICARE

## 2025-06-13 VITALS
WEIGHT: 190 LBS | SYSTOLIC BLOOD PRESSURE: 154 MMHG | RESPIRATION RATE: 16 BRPM | HEART RATE: 67 BPM | OXYGEN SATURATION: 97 % | HEIGHT: 69 IN | BODY MASS INDEX: 28.14 KG/M2 | TEMPERATURE: 98 F | DIASTOLIC BLOOD PRESSURE: 84 MMHG

## 2025-06-13 PROBLEM — H81.90 VESTIBULOPATHY: Status: ACTIVE | Noted: 2025-06-13

## 2025-06-13 PROCEDURE — 99214 OFFICE O/P EST MOD 30 MIN: CPT | Mod: 25

## 2025-06-13 PROCEDURE — 94060 EVALUATION OF WHEEZING: CPT

## 2025-06-13 PROCEDURE — ZZZZZ: CPT

## 2025-06-13 PROCEDURE — 94729 DIFFUSING CAPACITY: CPT

## 2025-06-13 PROCEDURE — 94727 GAS DIL/WSHOT DETER LNG VOL: CPT

## 2025-07-10 ENCOUNTER — APPOINTMENT (OUTPATIENT)
Dept: INTERNAL MEDICINE | Facility: CLINIC | Age: 75
End: 2025-07-10

## 2025-07-11 ENCOUNTER — APPOINTMENT (OUTPATIENT)
Dept: INTERNAL MEDICINE | Facility: CLINIC | Age: 75
End: 2025-07-11
Payer: MEDICARE

## 2025-07-11 VITALS
HEART RATE: 59 BPM | TEMPERATURE: 97.8 F | OXYGEN SATURATION: 98 % | WEIGHT: 187.44 LBS | DIASTOLIC BLOOD PRESSURE: 78 MMHG | SYSTOLIC BLOOD PRESSURE: 148 MMHG | HEIGHT: 69 IN | BODY MASS INDEX: 27.76 KG/M2

## 2025-07-11 PROBLEM — G47.62 NOCTURNAL LEG CRAMPS: Status: ACTIVE | Noted: 2025-07-11

## 2025-07-11 PROCEDURE — 99214 OFFICE O/P EST MOD 30 MIN: CPT

## 2025-07-11 RX ORDER — QUININE SULFATE 324 MG/1
324 CAPSULE ORAL
Qty: 90 | Refills: 3 | Status: ACTIVE | COMMUNITY
Start: 2025-07-11 | End: 1900-01-01

## 2025-07-11 RX ORDER — QUININE SULFATE 324 MG/1
0 CAPSULE ORAL
Refills: 0 | DISCHARGE
Start: 2025-07-11

## 2025-07-12 ENCOUNTER — INPATIENT (INPATIENT)
Facility: HOSPITAL | Age: 75
LOS: 1 days | Discharge: HOME | DRG: 149 | End: 2025-07-14
Attending: HOSPITALIST | Admitting: HOSPITALIST
Payer: MEDICARE

## 2025-07-12 VITALS — HEIGHT: 69 IN | WEIGHT: 190.48 LBS

## 2025-07-12 DIAGNOSIS — Z95.810 PRESENCE OF AUTOMATIC (IMPLANTABLE) CARDIAC DEFIBRILLATOR: Chronic | ICD-10-CM

## 2025-07-12 DIAGNOSIS — Z95.1 PRESENCE OF AORTOCORONARY BYPASS GRAFT: Chronic | ICD-10-CM

## 2025-07-12 DIAGNOSIS — R42 DIZZINESS AND GIDDINESS: ICD-10-CM

## 2025-07-12 DIAGNOSIS — Z98.890 OTHER SPECIFIED POSTPROCEDURAL STATES: Chronic | ICD-10-CM

## 2025-07-12 DIAGNOSIS — Z95.2 PRESENCE OF PROSTHETIC HEART VALVE: Chronic | ICD-10-CM

## 2025-07-12 DIAGNOSIS — Z90.89 ACQUIRED ABSENCE OF OTHER ORGANS: Chronic | ICD-10-CM

## 2025-07-12 LAB
ALBUMIN SERPL ELPH-MCNC: 3.8 G/DL — SIGNIFICANT CHANGE UP (ref 3.3–5)
ALP SERPL-CCNC: 70 U/L — SIGNIFICANT CHANGE UP (ref 40–120)
ALT FLD-CCNC: 52 U/L — SIGNIFICANT CHANGE UP (ref 12–78)
ANION GAP SERPL CALC-SCNC: 8 MMOL/L — SIGNIFICANT CHANGE UP (ref 5–17)
ANION GAP SERPL CALC-SCNC: 9 MMOL/L — SIGNIFICANT CHANGE UP (ref 5–17)
APTT BLD: 36.6 SEC — SIGNIFICANT CHANGE UP (ref 26.1–36.8)
AST SERPL-CCNC: 40 U/L — HIGH (ref 15–37)
BASOPHILS # BLD AUTO: 0.02 K/UL — SIGNIFICANT CHANGE UP (ref 0–0.2)
BASOPHILS NFR BLD AUTO: 0.5 % — SIGNIFICANT CHANGE UP (ref 0–2)
BILIRUB SERPL-MCNC: 0.8 MG/DL — SIGNIFICANT CHANGE UP (ref 0.2–1.2)
BUN SERPL-MCNC: 25 MG/DL — HIGH (ref 7–23)
BUN SERPL-MCNC: 25 MG/DL — HIGH (ref 7–23)
CALCIUM SERPL-MCNC: 8.9 MG/DL — SIGNIFICANT CHANGE UP (ref 8.5–10.1)
CALCIUM SERPL-MCNC: 9.3 MG/DL — SIGNIFICANT CHANGE UP (ref 8.5–10.1)
CHLORIDE SERPL-SCNC: 98 MMOL/L — SIGNIFICANT CHANGE UP (ref 96–108)
CHLORIDE SERPL-SCNC: 99 MMOL/L — SIGNIFICANT CHANGE UP (ref 96–108)
CO2 SERPL-SCNC: 22 MMOL/L — SIGNIFICANT CHANGE UP (ref 22–31)
CO2 SERPL-SCNC: 24 MMOL/L — SIGNIFICANT CHANGE UP (ref 22–31)
CREAT SERPL-MCNC: 1.34 MG/DL — HIGH (ref 0.5–1.3)
CREAT SERPL-MCNC: 1.45 MG/DL — HIGH (ref 0.5–1.3)
EGFR: 50 ML/MIN/1.73M2 — LOW
EGFR: 50 ML/MIN/1.73M2 — LOW
EGFR: 55 ML/MIN/1.73M2 — LOW
EGFR: 55 ML/MIN/1.73M2 — LOW
EOSINOPHIL # BLD AUTO: 0.09 K/UL — SIGNIFICANT CHANGE UP (ref 0–0.5)
EOSINOPHIL NFR BLD AUTO: 2.1 % — SIGNIFICANT CHANGE UP (ref 0–6)
GLUCOSE SERPL-MCNC: 100 MG/DL — HIGH (ref 70–99)
GLUCOSE SERPL-MCNC: 104 MG/DL — HIGH (ref 70–99)
HCT VFR BLD CALC: 34.7 % — LOW (ref 39–50)
HGB BLD-MCNC: 12.7 G/DL — LOW (ref 13–17)
IMM GRANULOCYTES # BLD AUTO: 0.01 K/UL — SIGNIFICANT CHANGE UP (ref 0–0.07)
IMM GRANULOCYTES NFR BLD AUTO: 0.2 % — SIGNIFICANT CHANGE UP (ref 0–0.9)
INR BLD: 2.32 RATIO — HIGH (ref 0.85–1.16)
LIDOCAIN IGE QN: 64 U/L — SIGNIFICANT CHANGE UP (ref 13–75)
LYMPHOCYTES # BLD AUTO: 0.99 K/UL — LOW (ref 1–3.3)
LYMPHOCYTES NFR BLD AUTO: 23.1 % — SIGNIFICANT CHANGE UP (ref 13–44)
MAGNESIUM SERPL-MCNC: 1.6 MG/DL — SIGNIFICANT CHANGE UP (ref 1.6–2.6)
MCHC RBC-ENTMCNC: 33.7 PG — SIGNIFICANT CHANGE UP (ref 27–34)
MCHC RBC-ENTMCNC: 36.6 G/DL — HIGH (ref 32–36)
MCV RBC AUTO: 92 FL — SIGNIFICANT CHANGE UP (ref 80–100)
MONOCYTES # BLD AUTO: 0.56 K/UL — SIGNIFICANT CHANGE UP (ref 0–0.9)
MONOCYTES NFR BLD AUTO: 13.1 % — SIGNIFICANT CHANGE UP (ref 2–14)
NEUTROPHILS # BLD AUTO: 2.61 K/UL — SIGNIFICANT CHANGE UP (ref 1.8–7.4)
NEUTROPHILS NFR BLD AUTO: 61 % — SIGNIFICANT CHANGE UP (ref 43–77)
NRBC # BLD AUTO: 0 K/UL — SIGNIFICANT CHANGE UP (ref 0–0)
NRBC # FLD: 0 K/UL — SIGNIFICANT CHANGE UP (ref 0–0)
NRBC BLD AUTO-RTO: 0 /100 WBCS — SIGNIFICANT CHANGE UP (ref 0–0)
NT-PROBNP SERPL-SCNC: 574 PG/ML — HIGH (ref 0–450)
PLATELET # BLD AUTO: 159 K/UL — SIGNIFICANT CHANGE UP (ref 150–400)
PMV BLD: 9.4 FL — SIGNIFICANT CHANGE UP (ref 7–13)
POTASSIUM SERPL-MCNC: 4.1 MMOL/L — SIGNIFICANT CHANGE UP (ref 3.5–5.3)
POTASSIUM SERPL-MCNC: 4.3 MMOL/L — SIGNIFICANT CHANGE UP (ref 3.5–5.3)
POTASSIUM SERPL-SCNC: 4.1 MMOL/L — SIGNIFICANT CHANGE UP (ref 3.5–5.3)
POTASSIUM SERPL-SCNC: 4.3 MMOL/L — SIGNIFICANT CHANGE UP (ref 3.5–5.3)
PROT SERPL-MCNC: 6.6 GM/DL — SIGNIFICANT CHANGE UP (ref 6–8.3)
PROTHROM AB SERPL-ACNC: 27.1 SEC — HIGH (ref 9.9–13.4)
RBC # BLD: 3.77 M/UL — LOW (ref 4.2–5.8)
RBC # FLD: 13.4 % — SIGNIFICANT CHANGE UP (ref 10.3–14.5)
SODIUM SERPL-SCNC: 128 MMOL/L — LOW (ref 135–145)
SODIUM SERPL-SCNC: 132 MMOL/L — LOW (ref 135–145)
TROPONIN I, HIGH SENSITIVITY RESULT: 10.45 NG/L — SIGNIFICANT CHANGE UP
TSH SERPL-MCNC: 2.57 UU/ML — SIGNIFICANT CHANGE UP (ref 0.34–4.82)
WBC # BLD: 4.28 K/UL — SIGNIFICANT CHANGE UP (ref 3.8–10.5)
WBC # FLD AUTO: 4.28 K/UL — SIGNIFICANT CHANGE UP (ref 3.8–10.5)

## 2025-07-12 PROCEDURE — 93010 ELECTROCARDIOGRAM REPORT: CPT

## 2025-07-12 PROCEDURE — 99285 EMERGENCY DEPT VISIT HI MDM: CPT

## 2025-07-12 PROCEDURE — 85027 COMPLETE CBC AUTOMATED: CPT

## 2025-07-12 PROCEDURE — 93356 MYOCRD STRAIN IMG SPCKL TRCK: CPT

## 2025-07-12 PROCEDURE — 70551 MRI BRAIN STEM W/O DYE: CPT

## 2025-07-12 PROCEDURE — 85730 THROMBOPLASTIN TIME PARTIAL: CPT

## 2025-07-12 PROCEDURE — 99222 1ST HOSP IP/OBS MODERATE 55: CPT

## 2025-07-12 PROCEDURE — 80048 BASIC METABOLIC PNL TOTAL CA: CPT

## 2025-07-12 PROCEDURE — 97161 PT EVAL LOW COMPLEX 20 MIN: CPT | Mod: GP

## 2025-07-12 PROCEDURE — 83935 ASSAY OF URINE OSMOLALITY: CPT

## 2025-07-12 PROCEDURE — 84443 ASSAY THYROID STIM HORMONE: CPT

## 2025-07-12 PROCEDURE — 85610 PROTHROMBIN TIME: CPT

## 2025-07-12 PROCEDURE — 93306 TTE W/DOPPLER COMPLETE: CPT

## 2025-07-12 PROCEDURE — 84300 ASSAY OF URINE SODIUM: CPT

## 2025-07-12 PROCEDURE — 76376 3D RENDER W/INTRP POSTPROCES: CPT

## 2025-07-12 PROCEDURE — 83930 ASSAY OF BLOOD OSMOLALITY: CPT

## 2025-07-12 PROCEDURE — 36415 COLL VENOUS BLD VENIPUNCTURE: CPT

## 2025-07-12 PROCEDURE — 94640 AIRWAY INHALATION TREATMENT: CPT

## 2025-07-12 PROCEDURE — 71045 X-RAY EXAM CHEST 1 VIEW: CPT | Mod: 26

## 2025-07-12 RX ORDER — MELATONIN 5 MG
3 TABLET ORAL AT BEDTIME
Refills: 0 | Status: DISCONTINUED | OUTPATIENT
Start: 2025-07-12 | End: 2025-07-14

## 2025-07-12 RX ORDER — LOSARTAN POTASSIUM 100 MG/1
100 TABLET, FILM COATED ORAL DAILY
Refills: 0 | Status: DISCONTINUED | OUTPATIENT
Start: 2025-07-12 | End: 2025-07-14

## 2025-07-12 RX ORDER — ONDANSETRON HCL/PF 4 MG/2 ML
4 VIAL (ML) INJECTION EVERY 8 HOURS
Refills: 0 | Status: DISCONTINUED | OUTPATIENT
Start: 2025-07-12 | End: 2025-07-14

## 2025-07-12 RX ORDER — MAGNESIUM, ALUMINUM HYDROXIDE 200-200 MG
30 TABLET,CHEWABLE ORAL EVERY 4 HOURS
Refills: 0 | Status: DISCONTINUED | OUTPATIENT
Start: 2025-07-12 | End: 2025-07-14

## 2025-07-12 RX ORDER — MECLIZINE HCL 12.5 MG
25 TABLET ORAL ONCE
Refills: 0 | Status: COMPLETED | OUTPATIENT
Start: 2025-07-12 | End: 2025-07-12

## 2025-07-12 RX ORDER — AMLODIPINE BESYLATE 10 MG/1
10 TABLET ORAL DAILY
Refills: 0 | Status: DISCONTINUED | OUTPATIENT
Start: 2025-07-12 | End: 2025-07-14

## 2025-07-12 RX ORDER — ASPIRIN 325 MG
81 TABLET ORAL DAILY
Refills: 0 | Status: DISCONTINUED | OUTPATIENT
Start: 2025-07-12 | End: 2025-07-14

## 2025-07-12 RX ORDER — ACETAMINOPHEN 500 MG/5ML
650 LIQUID (ML) ORAL EVERY 6 HOURS
Refills: 0 | Status: DISCONTINUED | OUTPATIENT
Start: 2025-07-12 | End: 2025-07-14

## 2025-07-12 RX ORDER — METOPROLOL SUCCINATE 50 MG/1
100 TABLET, EXTENDED RELEASE ORAL EVERY 12 HOURS
Refills: 0 | Status: DISCONTINUED | OUTPATIENT
Start: 2025-07-12 | End: 2025-07-14

## 2025-07-12 RX ORDER — MECLIZINE HCL 12.5 MG
25 TABLET ORAL EVERY 8 HOURS
Refills: 0 | Status: DISCONTINUED | OUTPATIENT
Start: 2025-07-12 | End: 2025-07-14

## 2025-07-12 RX ADMIN — Medication 1 DOSE(S): at 20:57

## 2025-07-12 RX ADMIN — Medication 81 MILLIGRAM(S): at 17:19

## 2025-07-12 RX ADMIN — Medication 1000 MILLILITER(S): at 12:56

## 2025-07-12 RX ADMIN — METOPROLOL SUCCINATE 100 MILLIGRAM(S): 50 TABLET, EXTENDED RELEASE ORAL at 21:37

## 2025-07-12 RX ADMIN — Medication 50 MILLILITER(S): at 15:44

## 2025-07-12 RX ADMIN — Medication 40 MILLIGRAM(S): at 17:20

## 2025-07-12 RX ADMIN — Medication 25 MILLIGRAM(S): at 13:00

## 2025-07-12 NOTE — ED ADULT TRIAGE NOTE - PAIN: PRESENCE, MLM
0100 Received report, Cedric Orourke, assumed care of patient. In bed resting, eyes closed, easily aroused. Initial assessment completed. 0300 In bed resting, eyes closed easily aroused. Appears comfortable. 0700 Uneventful shift for patient. Patient Vitals for the past 12 hrs:   Temp Pulse Resp BP SpO2   02/17/17 0359 98.2 °F (36.8 °C) 77 16 153/76 100 %   02/16/17 1950 98 °F (36.7 °C) 70 18 149/55 99 %     Bedside and Verbal shift change report given to JOAQUIM Rodríguez (oncoming nurse) by Easton Zambrano RN (offgoing nurse). Report included the following information SBAR, Kardex, Procedure Summary, Intake/Output, MAR, Recent Results and Cardiac Rhythm NSR. denies pain/discomfort (Rating = 0)

## 2025-07-12 NOTE — ED PROVIDER NOTE - PROGRESS NOTE DETAILS
Heraclio Navas M.D. - Patient hyponatremic.  Still dizzy when walking.  Will admit for symptomatic hyponatremia possibly.  Possible MRI needs for further testing for dizziness.  CTA not significant acutely at this time. admission request sent Heraclio Navas M.D. - Patient admitted to Dr. Ashby.  Will admit on telemetry.

## 2025-07-12 NOTE — ED ADULT NURSE NOTE - OBJECTIVE STATEMENT
Pt presents to ED c/o dizziness and "not feeling like myself, I feel discombobulated". Pt reports this started yesterday while moving the lawn at 11am yesterday. Pt reports has pacemaker/defib replaced in 2022. Pt has 5/5 strength in all extremities, clear speech, symmetrical face

## 2025-07-12 NOTE — H&P ADULT - HISTORY OF PRESENT ILLNESS
"74-year-old male with past medical history of valve replacement on warfarin also, A-fib status post pacemaker, presenting today for dizziness and unsteady gait since 11 AM yesterday.  Patient states that his head feels foggy.  No vision changes.  No headache.  No chest pain.  Has been having exertional shortness of breath.  No nausea vomiting.  No numbness tingling.  No weakness.  No difficulty with speech.  No history of stroke prior.  Vitals here hypertension to 140s over 80s otherwise within normal limits.  EKG showing paced rhythm without any signs of acute ischemia.  Physical exam shows well-appearing female nonacute distress alert with x 4 moving extremities and following commands.  Cranial nerves II to XII intact.  Slight horizontal nystagmus that is fatigable both sides.  5 out of 5 strength upper and lower extremities.  Gait is slightly abnormal as patient feels dizzy as soon as he gets up.  No crackles wheezing.  Abdomen soft nontender.  No pitting edema of the lower extremities or pain to palpation.  Concern at this time for stroke however not TNK candidate.  Does not seem LVO presentation.  Will do meclizine and fluids.  Will do labs cardiac workup.  Will do CTA of the head and neck and also chest x-ray.  Disposition pending labs imaging and reassessment.  If unable to walk then may need to be admitted for further imaging like MRI."      73 yo male with AVR on coumadin, afib, AICD/ppm p/w > 24 hours of dizziness.. Symptoms began around 11am. Pt states he feels "discombobulated." Denies feeling room spinning/ no diplopia, facial droop, ataxia, dysarthria, apahsia, paresthesias. Symptoms are not positional. No recent uri. Norvasc was recently increased to 10mg po qd. Last week was drinking vasts amounts of water. Endorses  nocturnal paresthesias  x 3 days. Not tnl candidate due to onset. CTA: neg for LVO. + mild to moderate L ICA stenosis.     ED course: + horizontal nystagmus. No other stroke like symptoms. VSS. Na: 128, Scr 1.4. Failed ambulation trial                  PAST MEDICAL/SURGICAL/FAMILY/SOCIAL HISTORY:    Past Medical, Past Surgical, and Family History:  PAST MEDICAL HISTORY:  Asthma     Atrial fibrillation     BPH (benign prostatic hyperplasia)     CAD (coronary artery disease)     Cardiomyopathy     Daily consumption of alcohol     ED (erectile dysfunction)     Exposure to Agent Orange     Frequency of urination     GERD (gastroesophageal reflux disease)     H/O hyperlipidemia     HTN (hypertension)     Mediastinal lymphadenopathy     NSVT (nonsustained ventricular tachycardia)     PAT (paroxysmal atrial tachycardia)     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     PAST SURGICAL HISTORY:  H/O basal cell carcinoma excision     H/O coronary artery bypass surgery 2006    H/O endoscopy     H/O inguinal hernia repair     H/O mechanical aortic valve replacement     History of squamous cell carcinoma excision     History of transcatheter aortic valve replacement (TAVR)     S/P ICD (internal cardiac defibrillator) procedure     S/P tonsillectomy     Status post dilation of urethral narrowing.     FAMILY HISTORY:  Father  Still living? Unknown  Family hx of prostate cancer, Age at diagnosis: Age Unknown.     Tobacco Usage:  · Tobacco Usage	Unknown if ever smoked    ALLERGIES AND HOME MEDICATIONS:   Allergies:        Allergies:  	Lipitor: Drug, Other, muscle ache  	Crestor: Drug, Other, muscle aches  	hay fever [freetext allergy; no alerts]: Miscellaneous, Sneezing, hay fever

## 2025-07-12 NOTE — ED ADULT TRIAGE NOTE - CHIEF COMPLAINT QUOTE
Pt presents to ED c/o dizziness, unsteady gait and shortness of breath since 11am yesterday. Pt states "My head just doesn't feel right. It all started while I was mowing the lawn yesterday at 11am." Denies vision changes, chest pain, palpitations. No neuro deficit observed, no facial asymmetry, equal  strength bilaterally, no arm/leg drift, positive strength to all four extremities, and positive sensation at this time. Pt ambulatory to triage. Airway patent, respirations even and unlabored, pt able to speak in full complete sentences w/o difficulty, SpO2 97% on room air in triage. Pmhx of Pacemaker, AICD and afib on warfarin

## 2025-07-12 NOTE — ED ADULT NURSE NOTE - HOW OFTEN DO YOU HAVE SIX OR MORE DRINKS ON ONE OCCASION?
the lawn. Pain Location: Knee (medial)    Pain Level: 1 (pain range 0-5/10)    Pain Descriptors: Aching [] yes  [x] no       Body structures, Functions, Activity limitations: Decreased functional mobility , Increased pain, Decreased endurance, Decreased strength  Assessment: Pt is 69 y.o. male with c/o increasing R knee pain since May without incident. X-ray completed on 6/1/2021 and showed degenerative changes to R knee. Pt exhibits impairments of increased R knee pain with squat and amb, decreased activity tolerance for amb due to pain, decreased proximal hip strength which decreases pt quality of life. PT is required to address pt impairments, progress strength and ROM, and provide pt with HEP for self management of knee pain and for improved quality of life. Prognosis: Excellent  Discharge Recommendations: Continue to assess pending progress      PT Education: Goals;PT Role;Plan of Care;Home Exercise Program    PLAN: [x] Evaluate and Treat  Frequency/Duration:  Plan  Times per week: 1  Plan weeks: 4  Current Treatment Recommendations: Strengthening, Neuromuscular Re-education, Home Exercise Program, ROM, Manual Therapy - Soft Tissue Mobilization, Safety Education & Training, Aquatics, Balance Training, Endurance Training, Patient/Caregiver Education & Training, Functional Mobility Training, Equipment Evaluation, Education, & procurement, Gait Training, Transfer Training, Modalities, Stair training, Pain Management, Positioning  Plan Comment: can add aquatic sessions if appropriate         Patient Status:[x] Continue/ Initiate plan of Care    [] Discharge PT. Recommend pt continue with HEP. [] Additional visits requested, Please re-certify for additional visits:          Signature: Electronically signed by Mindy Walters PT on 6/15/21 at 4:11 PM EDT      If you have any questions or concerns, please don't hesitate to call.   Thank you for your referral.    I have reviewed this plan of care and certify a Never

## 2025-07-12 NOTE — PATIENT PROFILE ADULT - FALL HARM RISK - HARM RISK INTERVENTIONS

## 2025-07-12 NOTE — ED PROVIDER NOTE - CLINICAL SUMMARY MEDICAL DECISION MAKING FREE TEXT BOX
Heraclio Navas M.D. - Is a 74-year-old male with past medical history of valve replacement on warfarin also, A-fib status post pacemaker, presenting today for dizziness and unsteady gait since 11 AM yesterday.  Patient states that his head feels foggy.  No vision changes.  No headache.  No chest pain.  Has been having exertional shortness of breath.  No nausea vomiting.  No numbness tingling.  No weakness.  No difficulty with speech.  No history of stroke prior.  Vitals here hypertension to 140s over 80s otherwise within normal limits.  EKG showing paced rhythm without any signs of acute ischemia.  Physical exam shows well-appearing female nonacute distress alert with x 4 moving extremities and following commands.  Cranial nerves II to XII intact.  Slight horizontal nystagmus that is fatigable both sides.  5 out of 5 strength upper and lower extremities.  Gait is slightly abnormal as patient feels dizzy as soon as he gets up.  No crackles wheezing.  Abdomen soft nontender.  No pitting edema of the lower extremities or pain to palpation.  Concern at this time for stroke however not TNK candidate.  Does not seem LVO presentation.  Will do meclizine and fluids.  Will do labs cardiac workup.  Will do CTA of the head and neck and also chest x-ray.  Disposition pending labs imaging and reassessment.  If unable to walk then may need to be admitted for further imaging like MRI.

## 2025-07-12 NOTE — ED PROVIDER NOTE - NSICDXPASTMEDICALHX_GEN_ALL_CORE_FT
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

## 2025-07-12 NOTE — H&P ADULT - ASSESSMENT
"74-year-old male with past medical history of valve replacement on warfarin also, A-fib status post pacemaker, presenting today for dizziness and unsteady gait since 11 AM yesterday.  Patient states that his head feels foggy.  No vision changes.  No headache.  No chest pain.  Has been having exertional shortness of breath.  No nausea vomiting.  No numbness tingling.  No weakness.  No difficulty with speech.  No history of stroke prior.  Vitals here hypertension to 140s over 80s otherwise within normal limits.  EKG showing paced rhythm without any signs of acute ischemia.  Physical exam shows well-appearing female nonacute distress alert with x 4 moving extremities and following commands.  Cranial nerves II to XII intact.  Slight horizontal nystagmus that is fatigable both sides.  5 out of 5 strength upper and lower extremities.  Gait is slightly abnormal as patient feels dizzy as soon as he gets up.  No crackles wheezing.  Abdomen soft nontender.  No pitting edema of the lower extremities or pain to palpation.  Concern at this time for stroke however not TNK candidate.  Does not seem LVO presentation.  Will do meclizine and fluids.  Will do labs cardiac workup.  Will do CTA of the head and neck and also chest x-ray.  Disposition pending labs imaging and reassessment.  If unable to walk then may need to be admitted for further imaging like MRI."      73 yo male with AVR on coumadin, afib, AICD/ppm p/w > 24 hours of dizziness.. Symptoms began around 11am. Pt states he feels "discombobulated." Denies feeling room spinning/ no diplopia, facial droop, ataxia, dysarthria, apahsia, paresthesias. Symptoms are not positional. No recent uri. Norvasc was recently increased to 10mg po qd. Last week was drinking vasts amounts of water. Endorses  nocturnal paresthesias  x 3 days. Not tnl candidate due to onset. CTA: neg for LVO. + mild to moderate L ICA stenosis.     ED course: + horizontal nystagmus. No other stroke like symptoms. VSS. Na: 128, Scr 1.4. Failed ambulation trial      #Disequilibrium suspect vertigo vs vbi  #Hyponatremia  - admit to remote tele  - neuro checks q8h  - ASA  - pt allergic to statins  - TTE  - MRI with EP consult  - gentle ivf  - goal Na correction 6-8 meq in 24 hours  - TSH, urie osm, urine Na, serum osm  - am cbc/bmp  - anti-vert prn  - resume coumadin, inr in am  - PT consult      Wife at bedside    Time spent:  55 min spent during this encounter including but not limited to chart review, external record review,  labs/imaging (both on independent interpretation and official review by radiology), medication reconciliation,  coordination of care and discussion with consultants. (Not including goc discussion)  Further tests such as imaging and blood test, medications, and procedures have been ordered as indicated. Laboratory results and the plan of care were communicated to the patient and family.    Time spent was including but not limited to:  - Reviewing, and interpreting labs and testing.  - Independently obtaining a review of systems and performing a physical exam  - Reviewing consultant documentation/recommendations in addition to discussing plan of care with consultants.  - Counselling and educating patient and family regarding interpretation of aforementioned items and plan of care.

## 2025-07-12 NOTE — H&P ADULT - NSHPPHYSICALEXAM_GEN_ALL_CORE
ICU Vital Signs Last 24 Hrs  T(C): 37.1 (12 Jul 2025 12:02), Max: 37.1 (12 Jul 2025 12:02)  T(F): 98.8 (12 Jul 2025 12:02), Max: 98.8 (12 Jul 2025 12:02)  HR: 60 (12 Jul 2025 14:52) (60 - 84)  BP: 125/75 (12 Jul 2025 14:52) (116/67 - 147/74)  BP(mean): 90 (12 Jul 2025 14:52) (90 - 95)  ABP: --  ABP(mean): --  RR: 18 (12 Jul 2025 14:52) (15 - 20)  SpO2: 99% (12 Jul 2025 14:52) (98% - 100%)    O2 Parameters below as of 12 Jul 2025 14:52  Patient On (Oxygen Delivery Method): room air            PHYSICAL EXAM:    Constitutional: NAD, awake and alert  HEENT: PERR, EOMI, Normal Hearing, MMM  Neck: Soft and supple, No LAD, No JVD  Respiratory: Breath sounds are clear bilaterally, No wheezing, rales or rhonchi  Cardiovascular: S1 and S2, regular rate and rhythm, no Murmurs, gallops or rubs  Gastrointestinal: Bowel Sounds present, soft, nontender, nondistended, no guarding, no rebound  Extremities: No peripheral edema  Vascular: 2+ peripheral pulses  Neurological: A/O x 3, no focal deficits, cn 2-12 in tact, normal motor and sensory exams. + horizontal nystagmus  Musculoskeletal: 5/5 strength b/l upper and lower extremities  Skin: No rashes

## 2025-07-12 NOTE — ED ADULT NURSE NOTE - NSFALLHARMRISKINTERV_ED_ALL_ED
Assistance OOB with selected safe patient handling equipment if applicable/Assistance with ambulation/Communicate risk of Fall with Harm to all staff, patient, and family/Encourage patient to sit up slowly, dangle for a short time, stand at bedside before walking/Monitor gait and stability/Orthostatic vital signs/Provide visual cue: red socks, yellow wristband, yellow gown, etc/Reinforce activity limits and safety measures with patient and family/Bed in lowest position, wheels locked, appropriate side rails in place/Call bell, personal items and telephone in reach/Instruct patient to call for assistance before getting out of bed/chair/stretcher/Non-slip footwear applied when patient is off stretcher/[place] to call system/Physically safe environment - no spills, clutter or unnecessary equipment/Purposeful Proactive Rounding/Room/bathroom lighting operational, light cord in reach

## 2025-07-12 NOTE — H&P ADULT - NSHPLABSRESULTS_GEN_ALL_CORE
LABS: All Labs Reviewed:                        12.7   4.28  )-----------( 159      ( 12 Jul 2025 12:52 )             34.7     07-12    128[L]  |  98  |  25[H]  ----------------------------<  104[H]  4.3   |  22  |  1.45[H]    Ca    8.9      12 Jul 2025 12:52  Mg     1.6     07-12    TPro  6.6  /  Alb  3.8  /  TBili  0.8  /  DBili  x   /  AST  40[H]  /  ALT  52  /  AlkPhos  70  07-12    PT/INR - ( 12 Jul 2025 12:52 )   PT: 27.1 sec;   INR: 2.32 ratio         PTT - ( 12 Jul 2025 12:52 )  PTT:36.6 sec          Blood Culture:       < from: CT Angio Neck w/ IV Cont (07.12.25 @ 13:14) >      IMPRESSION:        1.   Right carotid system:  No hemodynamically significant stenosis.        2.   Left carotid system:  Mild to moderate calcified plaque with   moderate (50%) stenosis at the origin of the LEFT internal carotid artery.        3.   Intracranial circulation:  No significant vascular lesion.  Mild   calcification at the cavernous internal carotid arteries.          4.   Brain:  No significant lesion identified.    < end of copied text >

## 2025-07-13 LAB
ANION GAP SERPL CALC-SCNC: 8 MMOL/L — SIGNIFICANT CHANGE UP (ref 5–17)
APTT BLD: 35.4 SEC — SIGNIFICANT CHANGE UP (ref 26.1–36.8)
BUN SERPL-MCNC: 21 MG/DL — SIGNIFICANT CHANGE UP (ref 7–23)
CALCIUM SERPL-MCNC: 9.3 MG/DL — SIGNIFICANT CHANGE UP (ref 8.5–10.1)
CHLORIDE SERPL-SCNC: 104 MMOL/L — SIGNIFICANT CHANGE UP (ref 96–108)
CO2 SERPL-SCNC: 23 MMOL/L — SIGNIFICANT CHANGE UP (ref 22–31)
CREAT SERPL-MCNC: 1.36 MG/DL — HIGH (ref 0.5–1.3)
EGFR: 54 ML/MIN/1.73M2 — LOW
EGFR: 54 ML/MIN/1.73M2 — LOW
GLUCOSE SERPL-MCNC: 98 MG/DL — SIGNIFICANT CHANGE UP (ref 70–99)
HCT VFR BLD CALC: 34.6 % — LOW (ref 39–50)
HGB BLD-MCNC: 12.6 G/DL — LOW (ref 13–17)
INR BLD: 2.12 RATIO — HIGH (ref 0.85–1.16)
INR BLD: 2.16 RATIO — HIGH (ref 0.85–1.16)
MCHC RBC-ENTMCNC: 33.5 PG — SIGNIFICANT CHANGE UP (ref 27–34)
MCHC RBC-ENTMCNC: 36.4 G/DL — HIGH (ref 32–36)
MCV RBC AUTO: 92 FL — SIGNIFICANT CHANGE UP (ref 80–100)
NRBC # BLD AUTO: 0 K/UL — SIGNIFICANT CHANGE UP (ref 0–0)
NRBC # FLD: 0 K/UL — SIGNIFICANT CHANGE UP (ref 0–0)
NRBC BLD AUTO-RTO: 0 /100 WBCS — SIGNIFICANT CHANGE UP (ref 0–0)
OSMOLALITY SERPL: 276 MOSMOL/KG — LOW (ref 280–301)
OSMOLALITY UR: 157 MOSM/KG — SIGNIFICANT CHANGE UP (ref 50–1200)
PLATELET # BLD AUTO: 135 K/UL — LOW (ref 150–400)
PMV BLD: 9 FL — SIGNIFICANT CHANGE UP (ref 7–13)
POTASSIUM SERPL-MCNC: 4.1 MMOL/L — SIGNIFICANT CHANGE UP (ref 3.5–5.3)
POTASSIUM SERPL-SCNC: 4.1 MMOL/L — SIGNIFICANT CHANGE UP (ref 3.5–5.3)
PROTHROM AB SERPL-ACNC: 24.6 SEC — HIGH (ref 9.9–13.4)
PROTHROM AB SERPL-ACNC: 24.8 SEC — HIGH (ref 9.9–13.4)
RBC # BLD: 3.76 M/UL — LOW (ref 4.2–5.8)
RBC # FLD: 13.5 % — SIGNIFICANT CHANGE UP (ref 10.3–14.5)
SODIUM SERPL-SCNC: 135 MMOL/L — SIGNIFICANT CHANGE UP (ref 135–145)
SODIUM UR-SCNC: 40 MMOL/L — SIGNIFICANT CHANGE UP
WBC # BLD: 3.58 K/UL — LOW (ref 3.8–10.5)
WBC # FLD AUTO: 3.58 K/UL — LOW (ref 3.8–10.5)

## 2025-07-13 PROCEDURE — 99233 SBSQ HOSP IP/OBS HIGH 50: CPT

## 2025-07-13 RX ADMIN — METOPROLOL SUCCINATE 100 MILLIGRAM(S): 50 TABLET, EXTENDED RELEASE ORAL at 21:30

## 2025-07-13 RX ADMIN — AMLODIPINE BESYLATE 10 MILLIGRAM(S): 10 TABLET ORAL at 09:53

## 2025-07-13 RX ADMIN — Medication 1 DOSE(S): at 07:48

## 2025-07-13 RX ADMIN — Medication 7 MILLIGRAM(S): at 00:33

## 2025-07-13 RX ADMIN — LOSARTAN POTASSIUM 100 MILLIGRAM(S): 100 TABLET, FILM COATED ORAL at 09:54

## 2025-07-13 RX ADMIN — Medication 81 MILLIGRAM(S): at 09:53

## 2025-07-13 RX ADMIN — METOPROLOL SUCCINATE 100 MILLIGRAM(S): 50 TABLET, EXTENDED RELEASE ORAL at 09:54

## 2025-07-13 RX ADMIN — Medication 1 DOSE(S): at 20:46

## 2025-07-13 RX ADMIN — Medication 7 MILLIGRAM(S): at 21:31

## 2025-07-13 RX ADMIN — Medication 40 MILLIGRAM(S): at 06:13

## 2025-07-13 NOTE — PHYSICAL THERAPY INITIAL EVALUATION ADULT - PERTINENT HX OF CURRENT PROBLEM, REHAB EVAL
74M presents with c/o for dizziness and unsteady gait since 11 AM yesterday.  Patient states that his head feels foggy.

## 2025-07-13 NOTE — DISCHARGE NOTE PROVIDER - HOSPITAL COURSE
#Discharge: do not delete    Patient is __ yo M/F with past medical history of _____  Presented with _____, found to have _____  Problem List/Main Diagnoses (system-based):   #Disequilibrium suspect vertigo vs vbi  IMPROVED  #Hyponatremia RESOLVED  appropriate correction of sodium  neuro checks wnl  symptoms of dizziness improved likely bppv   MR findings ___    - ASA  - pt allergic to statins  - TTE ___  - MRI with EP consult  - anti-vert prn  - resumed coumadin  - PT consult appreciated - has set up for pt and vestibular pt outpatient     Inpatient treatment course: as above  New medications: none         #Disequilibrium suspect vertigo vs vbi  IMPROVED  #Hyponatremia RESOLVED  appropriate correction of sodium  neuro checks wnl  symptoms of dizziness improved likely bppv   MR findings ___    - ASA  - pt allergic to statins  - TTE ___  - MRI with EP consult  - anti-vert prn  - resumed coumadin  - PT consult appreciated - has set up for pt and vestibular pt outpatient     Inpatient treatment course: as above  New medications: none         #Disequilibrium suspect vertigo vs vbi  IMPROVED  #Hyponatremia.Stable  appropriate correction of sodium  neuro checks wnl  symptoms of dizziness improved likely bppv   MR findings negative for acute process    - ASA  - pt allergic to statins  - TTE grossly unremarkable  - MRI with EP consult  - anti-vert prn  - resumed coumadin  - PT consult appreciated - has set up for pt and vestibular pt outpatient     Inpatient treatment course: as above  New medications: none

## 2025-07-13 NOTE — DISCHARGE NOTE PROVIDER - NSDCMRMEDTOKEN_GEN_ALL_CORE_FT
Aspirin Enteric Coated 81 mg oral delayed release tablet: 1 tab(s) orally once a day  losartan 100 mg oral tablet: 1 tab(s) orally once a day  Metoprolol Tartrate 100 mg oral tablet: 1 tab(s) orally every 12 hours  Norvasc 5 mg oral tablet: 2 tab(s) orally once a day  Protonix 40 mg oral delayed release tablet: 1 orally once a day  Repatha SureClick 140 mg/mL subcutaneous solution: Inject every 2 weeks  Symbicort 160 mcg-4.5 mcg/inh inhalation aerosol: 2 puff(s) inhaled 2 times a day  vitamin D3:   warfarin 5 mg oral tablet: 1 tablet orally once a day total of 7mg 6 days a week and 8mg once per week   Aspirin Enteric Coated 81 mg oral delayed release tablet: 1 tab(s) orally once a day  losartan 100 mg oral tablet: 1 tab(s) orally once a day  Metoprolol Tartrate 100 mg oral tablet: 1 tab(s) orally every 12 hours  Norvasc 5 mg oral tablet: 2 tab(s) orally once a day  Protonix 40 mg oral delayed release tablet: 1 tab(s) orally 2 times a day  quiNINE Sulfate 324 MG Oral Capsule: NEW MED from dr. diaz, pt has NOT started yet  Repatha SureClick 140 mg/mL subcutaneous solution: Inject every 2 weeks  sildenafil 100 mg oral tablet: 1 tab(s) orally prn  Symbicort 160 mcg-4.5 mcg/inh inhalation aerosol: 2 puff(s) inhaled 2 times a day  Vitamin D3 25 mcg (1000 intl units) oral capsule: 1 cap(s) orally once a day  warfarin 1 mg oral tablet: 2 tab(s) orally 6 times a week ***pt takes 8mg once a week &amp; 7mg 6 days a week***  warfarin 3 mg oral tablet: 1 tab(s) orally once a week ***pt takes 8mg once a week &amp; 7mg 6 days a week***  warfarin 5 mg oral tablet: 1 tab(s) orally once a day ***pt takes 8mg once a week &amp; 7mg 6 days a week***   Aspirin Enteric Coated 81 mg oral delayed release tablet: 1 tab(s) orally once a day  losartan 100 mg oral tablet: 1 tab(s) orally once a day  meclizine 25 mg oral tablet: 1 tab(s) orally every 6 hours as needed for  dizziness  Metoprolol Tartrate 100 mg oral tablet: 1 tab(s) orally every 12 hours  Norvasc 5 mg oral tablet: 2 tab(s) orally once a day  Protonix 40 mg oral delayed release tablet: 1 tab(s) orally 2 times a day  quiNINE Sulfate 324 MG Oral Capsule: NEW MED from dr. diaz, pt has NOT started yet  Repatha SureClick 140 mg/mL subcutaneous solution: Inject every 2 weeks  sildenafil 100 mg oral tablet: 1 tab(s) orally prn  Symbicort 160 mcg-4.5 mcg/inh inhalation aerosol: 2 puff(s) inhaled 2 times a day  Vitamin D3 25 mcg (1000 intl units) oral capsule: 1 cap(s) orally once a day  warfarin 1 mg oral tablet: 2 tab(s) orally 6 times a week ***pt takes 8mg once a week &amp; 7mg 6 days a week***  warfarin 3 mg oral tablet: 1 tab(s) orally once a week ***pt takes 8mg once a week &amp; 7mg 6 days a week***  warfarin 5 mg oral tablet: 1 tab(s) orally once a day ***pt takes 8mg once a week &amp; 7mg 6 days a week***

## 2025-07-13 NOTE — DISCHARGE NOTE PROVIDER - CARE PROVIDER_API CALL
Nav Herrera  Internal Medicine  [address] [place] [address], [address]  [place] [address]  Phone: [phone]  Fax: [phone]  Follow Up Time: 2 weeks

## 2025-07-13 NOTE — DISCHARGE NOTE PROVIDER - DISCHARGE DATE
Sure, he needs the nasal swab. Not sure if they will do it without a documented office visit. 13-Jul-2025 14-Jul-2025

## 2025-07-13 NOTE — PHYSICAL THERAPY INITIAL EVALUATION ADULT - CRITERIA FOR SKILLED THERAPEUTIC INTERVENTIONS
pt is independent with transfers/ambulation. no further inpatient PT needs at this time. will D/C PT.

## 2025-07-13 NOTE — PROGRESS NOTE ADULT - SUBJECTIVE AND OBJECTIVE BOX
CC: Patient is a 74y old  Male who presents with a chief complaint of dizziness (12 Jul 2025 15:20)      INTERVAL EVENTS: admitted to     SUBJECTIVE / INTERVAL HPI: Patient seen and examined at bedside. patient states his symptoms are much improved this morning after working with pt and has set up for outpt vestibular rehab    ROS: negative unless otherwise stated above.    VITAL SIGNS:  Vital Signs Last 24 Hrs  T(C): 36.4 (13 Jul 2025 08:23), Max: 36.9 (12 Jul 2025 15:25)  T(F): 97.5 (13 Jul 2025 08:23), Max: 98.5 (12 Jul 2025 15:25)  HR: 60 (13 Jul 2025 08:23) (60 - 80)  BP: 136/89 (13 Jul 2025 08:23) (111/59 - 155/60)  BP(mean): 90 (12 Jul 2025 14:52) (90 - 95)  RR: 18 (13 Jul 2025 08:23) (15 - 19)  SpO2: 96% (13 Jul 2025 08:23) (96% - 99%)    Parameters below as of 13 Jul 2025 08:23  Patient On (Oxygen Delivery Method): room air          07-12-25 @ 07:01  -  07-13-25 @ 07:00  --------------------------------------------------------  IN: 0 mL / OUT: 1150 mL / NET: -1150 mL        PHYSICAL EXAM:    General: NAD  HEENT: MMM  Neck: supple  Cardiovascular: +S1/S2; RRR  Respiratory: CTA B/L; no W/R/R  Gastrointestinal: soft, NT/ND  Extremities: WWP; no edema, clubbing or cyanosis  Vascular: 2+ radial, DP/PT pulses B/L  Neurological: AAOx3; no focal deficits    MEDICATIONS:  MEDICATIONS  (STANDING):  amLODIPine   Tablet 10 milliGRAM(s) Oral daily  aspirin enteric coated 81 milliGRAM(s) Oral daily  fluticasone propionate/ salmeterol 250-50 MICROgram(s) Diskus 1 Dose(s) Inhalation two times a day  losartan 100 milliGRAM(s) Oral daily  metoprolol tartrate 100 milliGRAM(s) Oral every 12 hours  pantoprazole    Tablet 40 milliGRAM(s) Oral before breakfast  sodium chloride 0.9%. 1000 milliLiter(s) (50 mL/Hr) IV Continuous <Continuous>    MEDICATIONS  (PRN):  acetaminophen     Tablet .. 650 milliGRAM(s) Oral every 6 hours PRN Temp greater or equal to 38C (100.4F), Mild Pain (1 - 3)  aluminum hydroxide/magnesium hydroxide/simethicone Suspension 30 milliLiter(s) Oral every 4 hours PRN Dyspepsia  meclizine 25 milliGRAM(s) Oral every 8 hours PRN Dizziness  melatonin 3 milliGRAM(s) Oral at bedtime PRN Insomnia  ondansetron Injectable 4 milliGRAM(s) IV Push every 8 hours PRN Nausea and/or Vomiting      ALLERGIES:  Allergies    Lipitor (Other)  Crestor (Other)  hay fever (Sneezing)    Intolerances        LABS:                        12.6   3.58  )-----------( 135      ( 13 Jul 2025 07:27 )             34.6     07-13    135  |  104  |  21  ----------------------------<  98  4.1   |  23  |  1.36[H]    Ca    9.3      13 Jul 2025 07:27  Mg     1.6     07-12    TPro  6.6  /  Alb  3.8  /  TBili  0.8  /  DBili  x   /  AST  40[H]  /  ALT  52  /  AlkPhos  70  07-12    PT/INR - ( 13 Jul 2025 07:27 )   PT: 24.6 sec;   INR: 2.16 ratio         PTT - ( 12 Jul 2025 23:48 )  PTT:35.4 sec  Urinalysis Basic - ( 13 Jul 2025 07:27 )    Color: x / Appearance: x / SG: x / pH: x  Gluc: 98 mg/dL / Ketone: x  / Bili: x / Urobili: x   Blood: x / Protein: x / Nitrite: x   Leuk Esterase: x / RBC: x / WBC x   Sq Epi: x / Non Sq Epi: x / Bacteria: x      CAPILLARY BLOOD GLUCOSE          RADIOLOGY & ADDITIONAL TESTS: Reviewed.

## 2025-07-13 NOTE — PHARMACOTHERAPY INTERVENTION NOTE - COMMENTS
Medication reconciliation completed.  Reviewed Medication list and confirmed med allergies with patient; confirmed with Dr. First Medlg.

## 2025-07-13 NOTE — DISCHARGE NOTE PROVIDER - NSDCCPCAREPLAN_GEN_ALL_CORE_FT
PRINCIPAL DISCHARGE DIAGNOSIS  Diagnosis: Dizziness  Assessment and Plan of Treatment: You presented with dizziness from home that resolved in the hospital  You worked with PT and had no issues  As mentioned you stated you have vestibular therpay set up outpatient which will be very helpful as this seems like it was due to positional vertigo  Your MRI showed _____     PRINCIPAL DISCHARGE DIAGNOSIS  Diagnosis: Dizziness  Assessment and Plan of Treatment: You presented with dizziness from home that resolved in the hospital  You worked with PT and had no issues  As mentioned you stated you have vestibular therpay set up outpatient which will be very helpful as this seems like it was due to positional vertigo  Your MRI showed no acute process  Also had a low sodium level. Hyponatremia=Low sodium concentration in the blood. This can contribute to weakness/lethargy and confusion. Most common cause of low sodium is not adequate oral hydration. Maintain adequate oral hydration. Also too much water can lead to a drop in your sodium level. You sodium level has stabalized and the goal is just to maintain adequate oral hydration. In your case is to keep that volume to around 2 liters (8759-7558 milliliters) daily. No more than that. Follow up outpatient with your primary medical provider and/or nephrologist (if applicable).     PRINCIPAL DISCHARGE DIAGNOSIS  Diagnosis: Dizziness  Assessment and Plan of Treatment: You presented with dizziness from home that resolved in the hospital  You worked with PT and had no issues  As mentioned you stated you have vestibular therpay set up outpatient which will be very helpful as this seems like it was due to positional vertigo  Your MRI showed no acute process  Also had a low sodium level. Hyponatremia=Low sodium concentration in the blood. This can contribute to weakness/lethargy and confusion. Most common cause of low sodium is not adequate oral hydration. Maintain adequate oral hydration. Also too much water can lead to a drop in your sodium level. You sodium level has stabalized and the goal is just to maintain adequate oral hydration. In your case is to keep that volume to around 2 liters (7271-7176 milliliters) daily. No more than that. Follow up outpatient with your primary medical provider and/or nephrologist (if applicable).      SECONDARY DISCHARGE DIAGNOSES  Diagnosis: Paroxysmal atrial fibrillation  Assessment and Plan of Treatment: Abnormal Heart Rythym leading to more inefficient blood flow at higher heart rates. Medications adjusted to control heart rate. Take medications as prescribed. In addition, with atrial fibrillation, increased risk of developing clots in the heart which can travel to the rest of the body and cause trouble (example is stroke). In order to prevent blood clots, you are on a blood thinner that helps prevent blood clot formation. Take medication as prescribed.   Take your coumadin as prescribed (your INR is therapeutic). Your next dose is due on the day of discharge.  Abnormal Heart Rythym leading to more inefficient blood flow at higher heart rates. Medications adjusted to control heart rate. Take medications as prescribed. In addition, with atrial fibrillation, increased risk of developing clots in the heart which can travel to the rest of the body and cause trouble (example is stroke). In order to prevent blood clots, you are on a blood thinner that helps prevent blood clot formation. Take medication as prescribed.     PRINCIPAL DISCHARGE DIAGNOSIS  Diagnosis: Dizziness  Assessment and Plan of Treatment: You presented with dizziness from home that resolved in the hospital  You worked with PT and had no issues  As mentioned you stated you have vestibular therpay set up outpatient which will be very helpful as this seems like it was due to positional vertigo  Your MRI showed no acute process  Also had a low sodium level. Hyponatremia=Low sodium concentration in the blood. This can contribute to weakness/lethargy and confusion. Most common cause of low sodium is not adequate oral hydration. Maintain adequate oral hydration. Also too much water can lead to a drop in your sodium level. You sodium level has stabalized and the goal is just to maintain adequate oral hydration. In your case is to keep that volume to around 2 liters (2000 milliliters) daily. No more than that. Follow up outpatient with your primary medical provider and/or nephrologist (if applicable).      SECONDARY DISCHARGE DIAGNOSES  Diagnosis: Paroxysmal atrial fibrillation  Assessment and Plan of Treatment: Abnormal Heart Rythym leading to more inefficient blood flow at higher heart rates. Medications adjusted to control heart rate. Take medications as prescribed. In addition, with atrial fibrillation, increased risk of developing clots in the heart which can travel to the rest of the body and cause trouble (example is stroke). In order to prevent blood clots, you are on a blood thinner that helps prevent blood clot formation. Take medication as prescribed.   Take your coumadin as prescribed (your INR is therapeutic). Your next dose is due on the day of discharge.  Abnormal Heart Rythym leading to more inefficient blood flow at higher heart rates. Medications adjusted to control heart rate. Take medications as prescribed. In addition, with atrial fibrillation, increased risk of developing clots in the heart which can travel to the rest of the body and cause trouble (example is stroke). In order to prevent blood clots, you are on a blood thinner that helps prevent blood clot formation. Take medication as prescribed.

## 2025-07-13 NOTE — DISCHARGE NOTE PROVIDER - ATTENDING DISCHARGE PHYSICAL EXAMINATION:
#Discharge: do not delete    Patient is __ yo M/F with past medical history of _____  Presented with _____, found to have _____  Problem List/Main Diagnoses (system-based):   Inpatient treatment course:   New medications:   Labs to be followed outpatient:   Exam to be followed outpatient:    PHYSICAL EXAM:    T(C): 36.8 (07-14-25 @ 15:47), Max: 36.8 (07-14-25 @ 15:47)  HR: 63 (07-14-25 @ 15:47) (60 - 98)  BP: 131/75 (07-14-25 @ 15:47) (130/73 - 146/76)  RR: 18 (07-14-25 @ 15:47) (16 - 18)  SpO2: 97% (07-14-25 @ 15:47) (97% - 100%)    General: AAOx3; NAD  Head: AT/NC  ENT: Moist Mucous Membranes; No Injury  Eyes: EOMI; PERRL  Neck: Non-tender; No JVD  CVS: RRR, S1&S2, No murmur, No edema  Respiratory: Lungs CTA B/L; Normal Respiratory Effort  Abdomen/GI: Soft, non-tender, non-distended, no guarding, no rebound, normal bowel sounds  : No bladder distention, No [place]  Extremities: No cyanosis, No clubbing, No edema  MSK: No CVA tenderness, Normal ROM, No injury  Neuro: AAOx3, CNII-XII grossly intact, non-focal  Psych: Appropriate, Cooperative,   Skin: Clean, Dry and Intact

## 2025-07-13 NOTE — PROGRESS NOTE ADULT - ASSESSMENT
"74-year-old male with past medical history of valve replacement on warfarin also, A-fib status post pacemaker, presenting today for dizziness and unsteady gait since 11 AM yesterday.  Patient states that his head feels foggy.  No vision changes.  No headache.  No chest pain.  Has been having exertional shortness of breath.  No nausea vomiting.  No numbness tingling.  No weakness.  No difficulty with speech.  No history of stroke prior.  Vitals here hypertension to 140s over 80s otherwise within normal limits.  EKG showing paced rhythm without any signs of acute ischemia.  Physical exam shows well-appearing female nonacute distress alert with x 4 moving extremities and following commands.  Cranial nerves II to XII intact.  Slight horizontal nystagmus that is fatigable both sides.  5 out of 5 strength upper and lower extremities.  Gait is slightly abnormal as patient feels dizzy as soon as he gets up.  No crackles wheezing.  Abdomen soft nontender.  No pitting edema of the lower extremities or pain to palpation.  Concern at this time for stroke however not TNK candidate.  Does not seem LVO presentation.  Will do meclizine and fluids.  Will do labs cardiac workup.  Will do CTA of the head and neck and also chest x-ray.  Disposition pending labs imaging and reassessment.  If unable to walk then may need to be admitted for further imaging like MRI."      75 yo male with AVR on coumadin, afib, AICD/ppm p/w > 24 hours of dizziness.. Symptoms began around 11am. Pt states he feels "discombobulated." Denies feeling room spinning/ no diplopia, facial droop, ataxia, dysarthria, apahsia, paresthesias. Symptoms are not positional. No recent uri. Norvasc was recently increased to 10mg po qd. Last week was drinking vasts amounts of water. Endorses  nocturnal paresthesias  x 3 days. Not tnl candidate due to onset. CTA: neg for LVO. + mild to moderate L ICA stenosis.     ED course: + horizontal nystagmus. No other stroke like symptoms. VSS. Na: 128, Scr 1.4. Failed ambulation trial      #Disequilibrium suspect vertigo vs vbi  IMPROVED  #Hyponatremia RESOLVED  appropriate correction of sodium - dc fluids  neuro checks wnl  symptoms of dizziness improved  discussed going home and getting mr done outpt as sx have resolved pt prefer staying as he was told it would be done 7/14 with help of ep  - ASA  - pt allergic to statins  - TTE pending  - MRI with EP consult  - anti-vert prn  - resumed coumadin, inr in am  - PT consult appreciated - has set up for pt and vestibular pt outpatient   dc in am after mri        "74-year-old male with past medical history of valve replacement on warfarin also, A-fib status post pacemaker, presenting today for dizziness and unsteady gait since 11 AM yesterday.  Patient states that his head feels foggy.  No vision changes.  No headache.  No chest pain.  Has been having exertional shortness of breath.  No nausea vomiting.  No numbness tingling.  No weakness.  No difficulty with speech.  No history of stroke prior.  Vitals here hypertension to 140s over 80s otherwise within normal limits.  EKG showing paced rhythm without any signs of acute ischemia.  Physical exam shows well-appearing female nonacute distress alert with x 4 moving extremities and following commands.  Cranial nerves II to XII intact.  Slight horizontal nystagmus that is fatigable both sides.  5 out of 5 strength upper and lower extremities.  Gait is slightly abnormal as patient feels dizzy as soon as he gets up.  No crackles wheezing.  Abdomen soft nontender.  No pitting edema of the lower extremities or pain to palpation.  Concern at this time for stroke however not TNK candidate.  Does not seem LVO presentation.  Will do meclizine and fluids.  Will do labs cardiac workup.  Will do CTA of the head and neck and also chest x-ray.  Disposition pending labs imaging and reassessment.  If unable to walk then may need to be admitted for further imaging like MRI."      75 yo male with AVR on coumadin, afib, AICD/ppm p/w > 24 hours of dizziness.. Symptoms began around 11am. Pt states he feels "discombobulated." Denies feeling room spinning/ no diplopia, facial droop, ataxia, dysarthria, apahsia, paresthesias. Symptoms are not positional. No recent uri. Norvasc was recently increased to 10mg po qd. Last week was drinking vasts amounts of water. Endorses  nocturnal paresthesias  x 3 days. Not tnl candidate due to onset. CTA: neg for LVO. + mild to moderate L ICA stenosis.     ED course: + horizontal nystagmus. No other stroke like symptoms. VSS. Na: 128, Scr 1.4. Failed ambulation trial      #Disequilibrium suspect vertigo vs vbi  IMPROVED  #Hyponatremia RESOLVED  appropriate correction of sodium - dc fluids  neuro checks wnl  symptoms of dizziness improved  discussed going home and getting mr done outpt as sx have resolved pt prefer staying as he was told it would be done 7/14 with help of ep  - ASA  - pt allergic to statins  - TTE pending  - MRI with EP consult  - anti-vert prn  - resumed coumadin, inr in am  - PT consult appreciated - has set up for pt and vestibular pt outpatient   -dc remote tele no events    #leukopenia  pt with wbc of 3.5 today  lower limit of normal   no c/f infx at this time  -will trend for now     dc in am after mri

## 2025-07-14 ENCOUNTER — RESULT REVIEW (OUTPATIENT)
Age: 75
End: 2025-07-14

## 2025-07-14 ENCOUNTER — TRANSCRIPTION ENCOUNTER (OUTPATIENT)
Age: 75
End: 2025-07-14

## 2025-07-14 VITALS
SYSTOLIC BLOOD PRESSURE: 131 MMHG | TEMPERATURE: 98 F | RESPIRATION RATE: 18 BRPM | HEART RATE: 63 BPM | DIASTOLIC BLOOD PRESSURE: 75 MMHG | OXYGEN SATURATION: 97 %

## 2025-07-14 PROBLEM — E87.1 HYPONATREMIA: Status: ACTIVE | Noted: 2025-07-14

## 2025-07-14 LAB
ANION GAP SERPL CALC-SCNC: 13 MMOL/L
ANION GAP SERPL CALC-SCNC: 5 MMOL/L — SIGNIFICANT CHANGE UP (ref 5–17)
APTT BLD: 36.7 SEC — SIGNIFICANT CHANGE UP (ref 26.1–36.8)
BUN SERPL-MCNC: 20 MG/DL
BUN SERPL-MCNC: 20 MG/DL — SIGNIFICANT CHANGE UP (ref 7–23)
CALCIUM SERPL-MCNC: 9.4 MG/DL — SIGNIFICANT CHANGE UP (ref 8.5–10.1)
CALCIUM SERPL-MCNC: 9.6 MG/DL
CHLORIDE SERPL-SCNC: 103 MMOL/L — SIGNIFICANT CHANGE UP (ref 96–108)
CHLORIDE SERPL-SCNC: 96 MMOL/L
CO2 SERPL-SCNC: 21 MMOL/L
CO2 SERPL-SCNC: 25 MMOL/L — SIGNIFICANT CHANGE UP (ref 22–31)
CREAT SERPL-MCNC: 1.32 MG/DL
CREAT SERPL-MCNC: 1.34 MG/DL — HIGH (ref 0.5–1.3)
EGFR: 55 ML/MIN/1.73M2 — LOW
EGFR: 55 ML/MIN/1.73M2 — LOW
EGFRCR SERPLBLD CKD-EPI 2021: 56 ML/MIN/1.73M2
GLUCOSE SERPL-MCNC: 98 MG/DL
GLUCOSE SERPL-MCNC: 99 MG/DL — SIGNIFICANT CHANGE UP (ref 70–99)
HCT VFR BLD CALC: 36.2 % — LOW (ref 39–50)
HGB BLD-MCNC: 12.9 G/DL — LOW (ref 13–17)
INR BLD: 2.16 RATIO — HIGH (ref 0.85–1.16)
MCHC RBC-ENTMCNC: 33.2 PG — SIGNIFICANT CHANGE UP (ref 27–34)
MCHC RBC-ENTMCNC: 35.6 G/DL — SIGNIFICANT CHANGE UP (ref 32–36)
MCV RBC AUTO: 93.1 FL — SIGNIFICANT CHANGE UP (ref 80–100)
NRBC # BLD AUTO: 0 K/UL — SIGNIFICANT CHANGE UP (ref 0–0)
NRBC # FLD: 0 K/UL — SIGNIFICANT CHANGE UP (ref 0–0)
NRBC BLD AUTO-RTO: 0 /100 WBCS — SIGNIFICANT CHANGE UP (ref 0–0)
PLATELET # BLD AUTO: 150 K/UL — SIGNIFICANT CHANGE UP (ref 150–400)
PMV BLD: 9.8 FL — SIGNIFICANT CHANGE UP (ref 7–13)
POTASSIUM SERPL-MCNC: 4.5 MMOL/L — SIGNIFICANT CHANGE UP (ref 3.5–5.3)
POTASSIUM SERPL-SCNC: 4.5 MMOL/L — SIGNIFICANT CHANGE UP (ref 3.5–5.3)
POTASSIUM SERPL-SCNC: 4.9 MMOL/L
PROTHROM AB SERPL-ACNC: 25.3 SEC — HIGH (ref 9.9–13.4)
RBC # BLD: 3.89 M/UL — LOW (ref 4.2–5.8)
RBC # FLD: 13.9 % — SIGNIFICANT CHANGE UP (ref 10.3–14.5)
SODIUM SERPL-SCNC: 130 MMOL/L
SODIUM SERPL-SCNC: 133 MMOL/L — LOW (ref 135–145)
WBC # BLD: 4.22 K/UL — SIGNIFICANT CHANGE UP (ref 3.8–10.5)
WBC # FLD AUTO: 4.22 K/UL — SIGNIFICANT CHANGE UP (ref 3.8–10.5)

## 2025-07-14 PROCEDURE — 93356 MYOCRD STRAIN IMG SPCKL TRCK: CPT

## 2025-07-14 PROCEDURE — 76376 3D RENDER W/INTRP POSTPROCES: CPT | Mod: 26

## 2025-07-14 PROCEDURE — 70551 MRI BRAIN STEM W/O DYE: CPT | Mod: 26

## 2025-07-14 PROCEDURE — 99239 HOSP IP/OBS DSCHRG MGMT >30: CPT

## 2025-07-14 PROCEDURE — 93306 TTE W/DOPPLER COMPLETE: CPT | Mod: 26

## 2025-07-14 PROCEDURE — 93283 PRGRMG EVAL IMPLANTABLE DFB: CPT | Mod: 26

## 2025-07-14 RX ORDER — SILDENAFIL 50 MG/1
1 TABLET, FILM COATED ORAL
Qty: 0 | Refills: 0 | DISCHARGE

## 2025-07-14 RX ORDER — MECLIZINE HCL 12.5 MG
1 TABLET ORAL
Qty: 40 | Refills: 0
Start: 2025-07-14 | End: 2025-07-23

## 2025-07-14 RX ADMIN — Medication 81 MILLIGRAM(S): at 09:54

## 2025-07-14 RX ADMIN — METOPROLOL SUCCINATE 100 MILLIGRAM(S): 50 TABLET, EXTENDED RELEASE ORAL at 09:53

## 2025-07-14 RX ADMIN — AMLODIPINE BESYLATE 10 MILLIGRAM(S): 10 TABLET ORAL at 09:54

## 2025-07-14 RX ADMIN — Medication 1 DOSE(S): at 08:14

## 2025-07-14 RX ADMIN — Medication 40 MILLIGRAM(S): at 05:12

## 2025-07-14 RX ADMIN — LOSARTAN POTASSIUM 100 MILLIGRAM(S): 100 TABLET, FILM COATED ORAL at 09:53

## 2025-07-14 NOTE — DISCHARGE NOTE NURSING/CASE MANAGEMENT/SOCIAL WORK - FINANCIAL ASSISTANCE
[facility] provides services at a reduced cost to those who are determined to be eligible through [facility]’s financial assistance program. Information regarding [facility]’s financial assistance program can be found by going to [email or web address] or by calling [phone].

## 2025-07-14 NOTE — DISCHARGE NOTE NURSING/CASE MANAGEMENT/SOCIAL WORK - PATIENT PORTAL LINK FT
You can access the FollowMyHealth Patient Portal offered by [facility] by registering at the following website: [email or web address]. By joining [employer]’s FollowMyHealth portal, you will also be able to view your health information using other applications (apps) compatible with our system.

## 2025-07-14 NOTE — CHART NOTE - NSCHARTNOTEFT_GEN_A_CORE
Patient with MDT [place] AICD  RA lead: [id number]  RV lead: [id number]    Device is MRI compatible  For MRI device should be programmed [id number]@80bpm

## 2025-07-19 DIAGNOSIS — Z88.8 ALLERGY STATUS TO OTHER DRUGS, MEDICAMENTS AND BIOLOGICAL SUBSTANCES: ICD-10-CM

## 2025-07-19 DIAGNOSIS — Z79.899 OTHER LONG TERM (CURRENT) DRUG THERAPY: ICD-10-CM

## 2025-07-19 DIAGNOSIS — Z79.01 LONG TERM (CURRENT) USE OF ANTICOAGULANTS: ICD-10-CM

## 2025-07-19 DIAGNOSIS — E87.1 HYPO-OSMOLALITY AND HYPONATREMIA: ICD-10-CM

## 2025-07-19 DIAGNOSIS — Z85.828 PERSONAL HISTORY OF OTHER MALIGNANT NEOPLASM OF SKIN: ICD-10-CM

## 2025-07-19 DIAGNOSIS — Z79.51 LONG TERM (CURRENT) USE OF INHALED STEROIDS: ICD-10-CM

## 2025-07-19 DIAGNOSIS — I25.10 ATHEROSCLEROTIC HEART DISEASE OF NATIVE CORONARY ARTERY WITHOUT ANGINA PECTORIS: ICD-10-CM

## 2025-07-19 DIAGNOSIS — Z79.82 LONG TERM (CURRENT) USE OF ASPIRIN: ICD-10-CM

## 2025-07-19 DIAGNOSIS — I10 ESSENTIAL (PRIMARY) HYPERTENSION: ICD-10-CM

## 2025-07-19 DIAGNOSIS — K21.9 GASTRO-ESOPHAGEAL REFLUX DISEASE WITHOUT ESOPHAGITIS: ICD-10-CM

## 2025-07-19 DIAGNOSIS — N40.0 BENIGN PROSTATIC HYPERPLASIA WITHOUT LOWER URINARY TRACT SYMPTOMS: ICD-10-CM

## 2025-07-19 DIAGNOSIS — E78.5 HYPERLIPIDEMIA, UNSPECIFIED: ICD-10-CM

## 2025-07-19 DIAGNOSIS — H81.10 BENIGN PAROXYSMAL VERTIGO, UNSPECIFIED EAR: ICD-10-CM

## 2025-07-19 DIAGNOSIS — Z95.810 PRESENCE OF AUTOMATIC (IMPLANTABLE) CARDIAC DEFIBRILLATOR: ICD-10-CM

## 2025-07-19 DIAGNOSIS — I48.0 PAROXYSMAL ATRIAL FIBRILLATION: ICD-10-CM

## 2025-07-19 DIAGNOSIS — I65.22 OCCLUSION AND STENOSIS OF LEFT CAROTID ARTERY: ICD-10-CM

## 2025-07-19 DIAGNOSIS — J45.909 UNSPECIFIED ASTHMA, UNCOMPLICATED: ICD-10-CM

## 2025-07-19 DIAGNOSIS — I42.9 CARDIOMYOPATHY, UNSPECIFIED: ICD-10-CM

## 2025-08-26 ENCOUNTER — NON-APPOINTMENT (OUTPATIENT)
Age: 75
End: 2025-08-26

## 2025-09-02 ENCOUNTER — APPOINTMENT (OUTPATIENT)
Dept: ELECTROPHYSIOLOGY | Facility: CLINIC | Age: 75
End: 2025-09-02